# Patient Record
Sex: FEMALE | Race: WHITE
[De-identification: names, ages, dates, MRNs, and addresses within clinical notes are randomized per-mention and may not be internally consistent; named-entity substitution may affect disease eponyms.]

---

## 2017-03-01 ENCOUNTER — HOSPITAL ENCOUNTER (INPATIENT)
Dept: HOSPITAL 61 - ER | Age: 82
LOS: 2 days | Discharge: HOME | DRG: 177 | End: 2017-03-03
Attending: INTERNAL MEDICINE | Admitting: INTERNAL MEDICINE
Payer: MEDICARE

## 2017-03-01 VITALS — SYSTOLIC BLOOD PRESSURE: 120 MMHG | DIASTOLIC BLOOD PRESSURE: 61 MMHG

## 2017-03-01 VITALS — SYSTOLIC BLOOD PRESSURE: 113 MMHG | DIASTOLIC BLOOD PRESSURE: 64 MMHG

## 2017-03-01 VITALS — HEIGHT: 56 IN | BODY MASS INDEX: 27.9 KG/M2 | WEIGHT: 124 LBS

## 2017-03-01 VITALS — DIASTOLIC BLOOD PRESSURE: 62 MMHG | SYSTOLIC BLOOD PRESSURE: 111 MMHG

## 2017-03-01 VITALS — SYSTOLIC BLOOD PRESSURE: 145 MMHG | DIASTOLIC BLOOD PRESSURE: 67 MMHG

## 2017-03-01 VITALS — SYSTOLIC BLOOD PRESSURE: 127 MMHG | DIASTOLIC BLOOD PRESSURE: 55 MMHG

## 2017-03-01 DIAGNOSIS — Z82.49: ICD-10-CM

## 2017-03-01 DIAGNOSIS — J44.0: ICD-10-CM

## 2017-03-01 DIAGNOSIS — E03.9: ICD-10-CM

## 2017-03-01 DIAGNOSIS — J69.0: Primary | ICD-10-CM

## 2017-03-01 DIAGNOSIS — J96.20: ICD-10-CM

## 2017-03-01 DIAGNOSIS — I48.2: ICD-10-CM

## 2017-03-01 DIAGNOSIS — Z92.3: ICD-10-CM

## 2017-03-01 DIAGNOSIS — M81.0: ICD-10-CM

## 2017-03-01 DIAGNOSIS — J45.909: ICD-10-CM

## 2017-03-01 DIAGNOSIS — Z88.6: ICD-10-CM

## 2017-03-01 DIAGNOSIS — J44.1: ICD-10-CM

## 2017-03-01 DIAGNOSIS — F41.9: ICD-10-CM

## 2017-03-01 DIAGNOSIS — Z90.710: ICD-10-CM

## 2017-03-01 DIAGNOSIS — Z99.81: ICD-10-CM

## 2017-03-01 DIAGNOSIS — Z85.118: ICD-10-CM

## 2017-03-01 DIAGNOSIS — F32.9: ICD-10-CM

## 2017-03-01 DIAGNOSIS — Z90.49: ICD-10-CM

## 2017-03-01 DIAGNOSIS — Z87.891: ICD-10-CM

## 2017-03-01 DIAGNOSIS — I50.42: ICD-10-CM

## 2017-03-01 LAB
ANION GAP SERPL CALC-SCNC: 1 MMOL/L (ref 6–14)
BASOPHILS # BLD AUTO: 0 X10^3/UL (ref 0–0.2)
BASOPHILS NFR BLD: 1 % (ref 0–3)
BUN SERPL-MCNC: 14 MG/DL (ref 7–20)
CALCIUM SERPL-MCNC: 9 MG/DL (ref 8.5–10.1)
CHLORIDE SERPL-SCNC: 97 MMOL/L (ref 98–107)
CO2 SERPL-SCNC: 43 MMOL/L (ref 21–32)
CREAT SERPL-MCNC: 1.1 MG/DL (ref 0.6–1)
EOSINOPHIL NFR BLD: 2 % (ref 0–3)
ERYTHROCYTE [DISTWIDTH] IN BLOOD BY AUTOMATED COUNT: 16.8 % (ref 11.5–14.5)
GFR SERPLBLD BASED ON 1.73 SQ M-ARVRAT: 47.3 ML/MIN
GLUCOSE SERPL-MCNC: 159 MG/DL (ref 70–99)
HCT VFR BLD CALC: 29.1 % (ref 36–47)
HGB BLD-MCNC: 9.1 G/DL (ref 12–15.5)
LYMPHOCYTES # BLD: 1.3 X10^3/UL (ref 1–4.8)
LYMPHOCYTES NFR BLD AUTO: 20 % (ref 24–48)
MCH RBC QN AUTO: 28 PG (ref 25–35)
MCHC RBC AUTO-ENTMCNC: 31 G/DL (ref 31–37)
MCV RBC AUTO: 89 FL (ref 79–100)
MONOCYTES NFR BLD: 12 % (ref 0–9)
NEUTROPHILS NFR BLD AUTO: 66 % (ref 31–73)
PLATELET # BLD AUTO: 102 X10^3/UL (ref 140–400)
POTASSIUM SERPL-SCNC: 4 MMOL/L (ref 3.5–5.1)
RBC # BLD AUTO: 3.28 X10^6/UL (ref 3.5–5.4)
SODIUM SERPL-SCNC: 141 MMOL/L (ref 136–145)
WBC # BLD AUTO: 6.7 X10^3/UL (ref 4–11)

## 2017-03-01 PROCEDURE — 36415 COLL VENOUS BLD VENIPUNCTURE: CPT

## 2017-03-01 PROCEDURE — 94640 AIRWAY INHALATION TREATMENT: CPT

## 2017-03-01 PROCEDURE — 83880 ASSAY OF NATRIURETIC PEPTIDE: CPT

## 2017-03-01 PROCEDURE — 96375 TX/PRO/DX INJ NEW DRUG ADDON: CPT

## 2017-03-01 PROCEDURE — 84484 ASSAY OF TROPONIN QUANT: CPT

## 2017-03-01 PROCEDURE — 80048 BASIC METABOLIC PNL TOTAL CA: CPT

## 2017-03-01 PROCEDURE — 71020: CPT

## 2017-03-01 PROCEDURE — 83605 ASSAY OF LACTIC ACID: CPT

## 2017-03-01 PROCEDURE — 71010: CPT

## 2017-03-01 PROCEDURE — 85027 COMPLETE CBC AUTOMATED: CPT

## 2017-03-01 PROCEDURE — 74230 X-RAY XM SWLNG FUNCJ C+: CPT

## 2017-03-01 PROCEDURE — 96365 THER/PROPH/DIAG IV INF INIT: CPT

## 2017-03-01 PROCEDURE — 94760 N-INVAS EAR/PLS OXIMETRY 1: CPT

## 2017-03-01 PROCEDURE — 93005 ELECTROCARDIOGRAM TRACING: CPT

## 2017-03-01 PROCEDURE — 87040 BLOOD CULTURE FOR BACTERIA: CPT

## 2017-03-01 PROCEDURE — 94250: CPT

## 2017-03-01 RX ADMIN — LEVOTHYROXINE SODIUM SCH MCG: 88 TABLET ORAL at 10:36

## 2017-03-01 RX ADMIN — METHYLPREDNISOLONE SODIUM SUCCINATE SCH MG: 125 INJECTION, POWDER, FOR SOLUTION INTRAMUSCULAR; INTRAVENOUS at 13:01

## 2017-03-01 RX ADMIN — METHYLPREDNISOLONE SODIUM SUCCINATE SCH MG: 125 INJECTION, POWDER, FOR SOLUTION INTRAMUSCULAR; INTRAVENOUS at 21:15

## 2017-03-01 RX ADMIN — Medication SCH MCG: at 10:37

## 2017-03-01 RX ADMIN — OXYCODONE HYDROCHLORIDE AND ACETAMINOPHEN PRN TAB: 10; 325 TABLET ORAL at 21:15

## 2017-03-01 RX ADMIN — PIPERACILLIN SODIUM AND TAZOBACTAM SODIUM SCH MLS/HR: 2; .25 INJECTION, POWDER, LYOPHILIZED, FOR SOLUTION INTRAVENOUS at 17:33

## 2017-03-01 RX ADMIN — ENOXAPARIN SODIUM SCH MG: 40 INJECTION SUBCUTANEOUS at 10:38

## 2017-03-01 RX ADMIN — ALPRAZOLAM PRN MG: 0.25 TABLET ORAL at 21:16

## 2017-03-01 RX ADMIN — DIGOXIN SCH MCG: 0.12 TABLET ORAL at 10:41

## 2017-03-01 RX ADMIN — IPRATROPIUM BROMIDE AND ALBUTEROL SULFATE SCH ML: .5; 3 SOLUTION RESPIRATORY (INHALATION) at 15:12

## 2017-03-01 RX ADMIN — TRAZODONE HYDROCHLORIDE SCH MG: 100 TABLET ORAL at 21:16

## 2017-03-01 RX ADMIN — SENNOSIDES A AND B SCH MG: 8.6 TABLET, FILM COATED ORAL at 10:35

## 2017-03-01 RX ADMIN — ALPRAZOLAM PRN MG: 0.25 TABLET ORAL at 16:16

## 2017-03-01 RX ADMIN — PIPERACILLIN SODIUM AND TAZOBACTAM SODIUM SCH MLS/HR: 2; .25 INJECTION, POWDER, LYOPHILIZED, FOR SOLUTION INTRAVENOUS at 12:33

## 2017-03-01 RX ADMIN — ALPRAZOLAM PRN MG: 0.25 TABLET ORAL at 10:33

## 2017-03-01 RX ADMIN — DOCUSATE SODIUM SCH MG: 100 CAPSULE, LIQUID FILLED ORAL at 10:32

## 2017-03-01 RX ADMIN — BACITRACIN SCH MLS/HR: 5000 INJECTION, POWDER, FOR SOLUTION INTRAMUSCULAR at 10:28

## 2017-03-01 RX ADMIN — IPRATROPIUM BROMIDE AND ALBUTEROL SULFATE SCH ML: .5; 3 SOLUTION RESPIRATORY (INHALATION) at 19:57

## 2017-03-01 RX ADMIN — IPRATROPIUM BROMIDE AND ALBUTEROL SULFATE SCH ML: .5; 3 SOLUTION RESPIRATORY (INHALATION) at 11:17

## 2017-03-01 RX ADMIN — PIPERACILLIN SODIUM AND TAZOBACTAM SODIUM SCH MLS/HR: 2; .25 INJECTION, POWDER, LYOPHILIZED, FOR SOLUTION INTRAVENOUS at 23:59

## 2017-03-01 RX ADMIN — ESCITALOPRAM OXALATE SCH MG: 10 TABLET ORAL at 10:35

## 2017-03-01 NOTE — PHYS DOC
Past Medical History


Past Medical History:  A-Fib, Anxiety, Asthma, Bronchitis, Cancer, COPD, 

Depression, Hypothyroid, Other


Additional Past Medical Histor:  chronic back, lung ca; hernia, HEART DISEASE, 

EMPHESEMA, Osteoporosis


Past Surgical History:  Appendectomy, Cholecystectomy, , Hysterectomy, 

Tonsillectomy, Other


Additional Past Surgical Histo:  back X2, hernia x 3


Alcohol Use:  None


Drug Use:  None





Adult General


Chief Complaint


Chief Complaint:  SHORTNESS OF BREATH





HPI


HPI


An 84-year-old female who presents with acute shortness of breath. EMS found 

her satting in the low 80s on her usual 6 L. She states she developed 

difficulty breathing apartment [address] ours prior to EMS arrival. She denies any 

chest pain. She denies any cough or fever. She does have history of atrial 

fibrillation and COPD. Patient was refusing CPAP therapy and nonrebreather and 

so she was they placed on 8 liters with improvement of her saturations. Upon 

arrival, patient is tachypnea but able to speak in complete sentences in mild 

respiratory distress.





Review of Systems


Review of Systems





Constitutional: Denies fever or chills []


Eyes: Denies change in visual acuity, redness, or eye pain []


HENT: Denies nasal congestion or sore throat []


Respiratory: Denies cough, has shortness of breath []


Cardiovascular: No additional information not addressed in HPI []


GI: Denies abdominal pain, nausea, vomiting, bloody stools or diarrhea []


: Denies dysuria or hematuria []


Musculoskeletal: Denies back pain or joint pain []


Integument: Denies rash or skin lesions []


Neurologic: Denies headache, focal weakness or sensory changes []


Endocrine: Denies polyuria or polydipsia []





Current Medications


Current Medications





 Current Medications








 Medications


  (Trade)  Dose


 Ordered  Sig/Franky  Start Time


 Stop Time Status Last Admin


Dose Admin


 


 Albuterol/


 Ipratropium


  (Duoneb)  3 ml  1X  ONCE  3/1/17 05:15


 3/1/17 05:20 DC  


 


 


 Albuterol/


 Ipratropium 3 ml  3 ml  RTQID  3/1/17 08:00


 3/2/17 07:59   


 


 


 Levofloxacin/


 Dextrose


  (LEVAQUIN 750mg


 PREMIX)  150 ml @ 


 100 mls/hr  Q48H  3/3/17 05:00


     


 


 


 Levofloxacin/


 Dextrose


  (Levaquin Per


 Pharmacy)  1 each  PRN DAILY  PRN  3/1/17 06:00


     


 


 


 Methylprednisolone


 Sodium Succinate


  (Solu-Medrol


 125mg Vial)  125 mg  1X  ONCE  3/1/17 05:15


 3/1/17 05:20 DC 3/1/17 05:52


125 MG


 


 Ondansetron HCl


  (Zofran)  4 mg  PRN Q8HRS  PRN  3/1/17 06:00


 3/2/17 05:59   


 


 


 Piperacillin Sod/


 Tazobactam Sod


  (Zosyn Per


 Pharmacy)  1 each  PRN DAILY  PRN  3/1/17 06:00


   UNV  


 


 


 Piperacillin Sod/


 Tazobactam Sod


 2.25 gm/Sodium


 Chloride  50 ml @ 


 100 mls/hr  ONCE  ONCE  3/1/17 07:00


 3/1/17 07:29  3/1/17 06:23


100 MLS/HR


 


 Vancomycin HCl


  (Vanco Per


 Pharmacy)  1 each  PRN DAILY  PRN  3/1/17 06:00


   UNV  


 











Allergies


Allergies





 Allergies








Coded Allergies Type Severity Reaction Last Updated Verified


 


  morphine Allergy Intermediate PER DAUGHTER, DOES NOT AGREE WITH PT 14 Yes


 


  adhesive Adverse Reaction Intermediate blisters 4/22/15 Yes


 


  aspirin Adverse Reaction Intermediate Nausea and Vomiting, "makes me sick" 4/

22/15 Yes











Physical Exam


Physical Exam





Constitutional: Well developed, well nourished, no acute distress, non-toxic 

appearance. []


HENT: Normocephalic, atraumatic, bilateral external ears normal, oropharynx 

moist, no oral exudates, nose normal. []


Eyes: PERRLA, EOMI, conjunctiva normal, no discharge. [] 


Neck: Normal range of motion, no tenderness, supple, no stridor. [] 


Cardiovascular:Heart rate regular rhythm, no murmur []


Lungs & Thorax:  Bilateral breath sounds clear to auscultation []


Abdomen: Bowel sounds normal, soft, no tenderness, no masses, no pulsatile 

masses. [] 


Skin: Warm, dry, no erythema, no rash. [] 


Back: No tenderness, no CVA tenderness. [] 


Extremities: No tenderness, no cyanosis, no clubbing, ROM intact, no edema. [] 


Neurologic: Alert and oriented X 3, normal motor function, normal sensory 

function, no focal deficits noted. []


Psychologic: Affect normal, judgement normal, mood normal. []





Current Patient Data


Vital Signs





 Vital Signs








  Date Time  Temp Pulse Resp B/P Pulse Ox O2 Delivery O2 Flow Rate FiO2


 


3/1/17 05:31     94 Nasal Cannula 8.0 


 


3/1/17 05:25 98.5 92 28 109/73    





 98.5       








Lab Values





 Laboratory Tests








Test


  3/1/17


05:10


 


White Blood Count


  6.7x10^3/uL


(4.0-11.0)


 


Red Blood Count


  3.28x10^6/uL


(3.50-5.40)  L


 


Hemoglobin


  9.1g/dL


(12.0-15.5)  L


 


Hematocrit


  29.1%


(36.0-47.0)  L


 


Mean Corpuscular Volume 89fL ()  


 


Mean Corpuscular Hemoglobin 28pg (25-35)  


 


Mean Corpuscular Hemoglobin


Concent 31g/dL (31-37)


 


 


Red Cell Distribution Width


  16.8%


(11.5-14.5)  H


 


Platelet Count


  102x10^3/uL


(140-400)  L


 


Neutrophils (%) (Auto) 66% (31-73)  


 


Lymphocytes (%) (Auto) 20% (24-48)  L


 


Monocytes (%) (Auto) 12% (0-9)  H


 


Eosinophils (%) (Auto) 2% (0-3)  


 


Basophils (%) (Auto) 1% (0-3)  


 


Neutrophils # (Auto)


  4.4x10^3uL


(1.8-7.7)


 


Lymphocytes # (Auto)


  1.3x10^3/uL


(1.0-4.8)


 


Monocytes # (Auto)


  0.8x10^3/uL


(0.0-1.1)


 


Eosinophils # (Auto)


  0.1x10^3/uL


(0.0-0.7)


 


Basophils # (Auto)


  0.0x10^3/uL


(0.0-0.2)


 


Sodium Level


  141mmol/L


(136-145)


 


Potassium Level


  4.0mmol/L


(3.5-5.1)


 


Chloride Level


  97mmol/L


()  L


 


Carbon Dioxide Level


  43mmol/L


(21-32)  H


 


Anion Gap 1 (6-14)  L


 


Blood Urea Nitrogen


  14mg/dL (7-20)


 


 


Creatinine


  1.1mg/dL


(0.6-1.0)  H


 


Estimated GFR


(Cockcroft-Gault) 47.3  


 


 


Glucose Level


  159mg/dL


(70-99)  H


 


Lactic Acid Level


  1.1mmol/L


(0.4-2.0)


 


Calcium Level


  9.0mg/dL


(8.5-10.1)


 


Troponin I Quantitative


  < 0.017ng/mL


(0.000-0.055)


 


NT-Pro-B-Type Natriuretic


Peptide 1257pg/mL


(0-449)  H





 Laboratory Tests


3/1/17 05:10








 Laboratory Tests


3/1/17 05:10











EKG


EKG


EKG as interpreted by me shows an irregular rhythm with a rate of 102 bpm. 

There is some artifact seen throughout this EKG but there are no obvious 

ischemic findings. This EKG does not meet any STEMI criteria. The intervals are 

normal. There is a leftward axis. There are occasional PVCs





Radiology/Procedures


Radiology/Procedures


One view of the chest as interpreted by me shows findings concerning for a 

possible left-sided pneumonia.





Course & Med Decision Making


Course & Med Decision Making


Pertinent Labs and Imaging studies reviewed. (See chart for details)





84-year-old female who is had significant significant dyspnea and requiring 

higher oxygen requirement than her usual 6 L will be admitted to the hospital 

for likely COPD exacerbation and a healthcare acquired pneumonia. I discussed 

the need to admit the patient with the hospitalist, Dr. Colindres, who agreed to 

begin antibiotic therapy and to consult pulmonology as well. Patient was given 

a breathing treatment and steroids as well. Antibiotic therapy was ordered and 

initiated. Cultures and lactate were obtained prior to antibiotic 

administration. Her laboratory workup was fairly unremarkable other than a 

slightly elevated proBNP.





Dragon Disclaimer


Dragon Disclaimer


This electronic medical record was generated, in whole or in part, using a 

voice recognition dictation system.





Departure


Departure


Impression:  


 Primary Impression:  


 COPD (chronic obstructive pulmonary disease)


 Additional Impression:  


 Healthcare-associated pneumonia


Disposition:   ADMITTED AS INPATIENT


Admitting Physician:  Summer Tabor


Condition:  STABLE


Referrals:  


SUMMER TABOR MD (PCP)





Problem Qualifiers








VILMA HINOJOSA DO Mar 1, 2017 05:23

## 2017-03-01 NOTE — HP
ADMIT DATE:  2017



REASON FOR ADMISSION TO THE HOSPITAL:  COPD with acute exacerbation, possible

pneumonia.



HISTORY OF PRESENT ILLNESS:  The patient is an 84-year-old female patient known

to me who has a history of severe COPD and lung cancer.  She was having

shortness of breath and cough and came to the Emergency Room.  She was admitted

to the hospital with COPD exacerbation and possible pneumonitis.



PAST MEDICAL HISTORY:  COPD and hypothyroidism.  She is on 5 liters oxygen,

diastolic heart failure, atrial fibrillation, lung cancer, radiation treatment 4

years ago, osteoporosis, and compression of the spine, had multiple

vertebroplasties.



PAST SURGICAL HISTORY:  Appendectomy, gallbladder surgery, ,

hysterectomy, tonsillectomy, back surgeries x 2, hernia surgery, and

vertebroplasties at least 2 or 3.



ALLERGIES:  MORPHINE, ASPIRIN, AND ADHESIVE TAPE.



MEDICATIONS AT HOME:  She is on 3 liters, can go up to 5 liters of oxygen daily,

B12 1000 mcg daily, DuoNeb 4 times daily, Xanax 0.25 three times daily,

Symbicort twice a day 160/4.5, vitamin D 1000 units daily, digoxin 125 mcg

daily, Colace 100 mg daily, Lexapro 20 mg daily, Combivent 2 puffs 4 times

daily, levothyroxine 88 mcg daily, oxycodone 5 mg q. 6 hours, trazodone 100 mg

daily, and Zofran for nausea.



PERSONAL HISTORY:  She smoked for 30 years, quit 4 to 5 years ago.  Denies

alcohol or drug abuse.



SOCIAL HISTORY:  Lives at home.  She uses a walker, and she has oxygen at home

and a breathing machine at home.



FAMILY HISTORY:  Positive for hypertension and heart disease.



REVIEW OF SYMPTOMS:  Denies any coughing or spitting green phlegm at the present

time.  A couple of days ago, she had shortness of breath and wheezing.  No chest

pain.  Rest of the 14-system was reviewed and negative.



PHYSICAL EXAMINATION:

VITAL SIGNS:  At the time of admission shows a temperature 98, pulse 96,

respirations 26, and blood pressure 109/73, 96% on 8 liters.

HEENT:  Head is atraumatic.  Pupils are equal.  Oral cavity:  Dentures.  Slight

congestion of posterior pharynx.

CHEST:  COPD pattern.

CARDIOVASCULAR:  S1 and S2.

LUNGS:  Bilateral wheezing.

ABDOMEN:  Soft, no mass palpable.

EXTERNAL GENITALIA:  No Gusman.

RECTAL:  Deferred.

EXTREMITIES:  No calf tenderness or edema.  The patient has kyphosis of the

spine from osteoporosis.

SKIN:  Normal skin turgor.

LYMPH NODES:  No significant lymphadenopathy.  Pulses 1+ dorsal and posterior

tibial.



LABORATORY DATA:  White count 6, hemoglobin 9, and platelets 102.  Electrolytes

show sodium 141, potassium 4.1, chloride 97, bicarbonate 43, BUN 14, creatinine

1.1, glucose 159, lactic acid 1.9, troponin 0.127, and BNP 1257.  Chest x-ray

shows no acute findings, some fibrosis and scarring.



FINAL IMPRESSION:

1.  Chronic obstructive pulmonary disease with acute exacerbation, possibility

of underlying pneumonitis.

2.  Scarring in the lung, history of lung cancer, had radiation treatment 4 to 5

years ago.

3.  Hypothyroidism.

4.  Atrial fibrillation.

5.  Chronic systolic heart failure.

6.  Anxiety and depression.



PLAN:  At this time, admit to hospital, sputum cultures.  Started on Solu-Medrol

t.i.d., Zosyn, and vancomycin and see how the patient can improve.  Some

breathing treatments, hydration, and the patient wishes DNR.

 



______________________________

SUMMER TABOR MD



DR:  RAGINI/aidan  JOB#:  [id number] / [id number]

DD:  2017 10:02  DT:  2017 13:01

## 2017-03-01 NOTE — ACF
Admission Forms Criteria


                                                           COPD





Clinical Indications for Admission to Inpatient Care


                                                                                

(Place 'X' for any and all applicable criteria):





Admission is indicated for ANY ONE of the following (1)(2)(3):


[X]I.    Acute exacerbation by high-risk comorbidity (e.g., pneumonia, 

dysrhythmia, heart failure, pleural effusion, 


        pneumothorax) or severe underlying COPD (e.g., steroid dependent)


[ ]II.   Inpatient admission required rather than observation care (see Chronic 

Obstructive Pulmonary


        Disease: Observation Care) because of ANY ONE of the following:


        [ ]a)   New or pre-existing signs or symptoms of COPD (eg, dyspnea or 

Tachypnea at rest or with


                 minimal activity) that persist despite outpatient and 

observation care treatment


        [ ]b)   New-onset hypoxemia (room air SaO2 less than 90%, PO2 less than 

60 mm Hg (8.0 kPa))


                 that persists despite outpatient and observation care treatment


        [ ]c)   Worsening of pre-existing hypoxemia (eg, new or increased 

requirement for supplemental


                 oxygen to maintain oxygenation at baseline level) that 

persists despite outpatient and


                 observation care treatment, with oxygen treatment needs 

performable only in acute inpatient setting


        [ ]d)   Hypercarbia (PCO2 greater than 40 mm Hg (5.3 kPa))-induced 

respiratory acidosis (pH less


                 than 7.35) that persists despite outpatient and observation 

care treatment


        [ ]e)   Supplemental oxygen or respiratory treatments for over 24 hours 

that are performable only in acute inpatient setting


        [ ]f)    Chest tube placement with active evacuation (e.g., suction, 

drainage) (5)


        [ ]g)   Other condition, treatment or monitoring requiring inpatient 

admission


[ ]III.  Planned invasive surgical or diagnostic procedures requiring acute-

care hospitalization 


[ ]IV. Acute respiratory failure (e.g., uncompensated hypercarbia, severe 

hypoxemia) 


[ ]V.  Severe comorbid condition (e.g., severe steroid myopathy, acute 

vertebral fracture) that has acutely worsened pulmonary function


[ ]VI. Confusion state, lethargy, obtundation, stupor or coma











Extended stay beyond goal length of stay may be needed for (31)(32):


[ ]a ) Respiratory Failure.


[ ]b) Severe or persisting hypoxemia or hypercarbia


[ ]c) Severe or persistent dyspnea


[ ]d) Comorbidities (e.g. chronic heart failure, atrial fibrillation with rapid 

response, pneumonia)


[ ]e) Malnutrition








The original [facility] content created by [facility] has been revised. 


The portions of the content which have been revised are identified through the 

use of italic text or in bold, and [facility] 


has neither reviewed nor approved the  modified material. All other unmodified 

content is copyright   [facility].





Please see references footnoted in the original [facility] edition 

2016


Admission Criteria Met?:  Yes








MARGARETTE DIAZ Mar 1, 2017 10:06

## 2017-03-01 NOTE — EKG
[facility]

              [address] [place] [address]

Test Date:    2017               Test Time:    05:16:14

Pat Name:     YAN COLLINS             Department:   

Patient ID:   [id number]           Room:          

Gender:       F                        Technician:   

:          1932               Requested By: VILMA HINOJOSA

Order Number: [id number]            Reading MD:   Joce Lemons

                                 Measurements

Intervals                              Axis          

Rate:         102                      P:            

KS:                                    QRS:          -24

QRSD:         78                       T:            67

QT:           340                                    

QTc:          447                                    

                           Interpretive Statements

ATRIAL FIBRILLATION.

VENTRICULAR PREMATURE COMPLEX(ES)

LEFTWARD AXIS

LOW LIMB LEAD VOLTAGE

QRS(T) CONTOUR ABNORMALITY

CONSIDER ANTEROSEPTAL INFARCT

CONSISTENT WITH INFERIOR INFARCT

PROBABLY OLD

T ABNORMALITY IN HIGH LATERAL LEADS

RI6.01          Unconfirmed report



Electronically Signed On 3-6-2017 13:33:34 CST by Joce Lemons

## 2017-03-01 NOTE — RAD
Indication shortness of air.



A single view of the chest was obtained. Comparison is made to an examination

January 18, 2017.



There are suspect background changes of fibrosis. Heart size is unchanged. The

pulmonary vasculature is similar. Acute parenchymal infiltrate is not seen. A

significant change in the appearance of the chest compared to the previous

exam is not seen. Kyphoplasty changes are noted at multiple levels similar to

the prior study. Hiatus hernia is noted.



IMPRESSION: No acute finding. No significant change

## 2017-03-02 VITALS — DIASTOLIC BLOOD PRESSURE: 73 MMHG | SYSTOLIC BLOOD PRESSURE: 145 MMHG

## 2017-03-02 VITALS — SYSTOLIC BLOOD PRESSURE: 112 MMHG | DIASTOLIC BLOOD PRESSURE: 60 MMHG

## 2017-03-02 VITALS — DIASTOLIC BLOOD PRESSURE: 44 MMHG | SYSTOLIC BLOOD PRESSURE: 130 MMHG

## 2017-03-02 VITALS — SYSTOLIC BLOOD PRESSURE: 122 MMHG | DIASTOLIC BLOOD PRESSURE: 49 MMHG

## 2017-03-02 VITALS — SYSTOLIC BLOOD PRESSURE: 133 MMHG | DIASTOLIC BLOOD PRESSURE: 49 MMHG

## 2017-03-02 VITALS — DIASTOLIC BLOOD PRESSURE: 72 MMHG | SYSTOLIC BLOOD PRESSURE: 118 MMHG

## 2017-03-02 RX ADMIN — DIGOXIN SCH MCG: 0.12 TABLET ORAL at 08:54

## 2017-03-02 RX ADMIN — OXYCODONE HYDROCHLORIDE AND ACETAMINOPHEN PRN TAB: 10; 325 TABLET ORAL at 20:48

## 2017-03-02 RX ADMIN — IPRATROPIUM BROMIDE AND ALBUTEROL SULFATE SCH ML: .5; 3 SOLUTION RESPIRATORY (INHALATION) at 15:01

## 2017-03-02 RX ADMIN — OXYCODONE HYDROCHLORIDE AND ACETAMINOPHEN PRN TAB: 10; 325 TABLET ORAL at 08:58

## 2017-03-02 RX ADMIN — LEVOTHYROXINE SODIUM SCH MCG: 88 TABLET ORAL at 05:17

## 2017-03-02 RX ADMIN — DOCUSATE SODIUM SCH MG: 100 CAPSULE, LIQUID FILLED ORAL at 08:52

## 2017-03-02 RX ADMIN — SENNOSIDES A AND B SCH MG: 8.6 TABLET, FILM COATED ORAL at 08:53

## 2017-03-02 RX ADMIN — Medication SCH MCG: at 08:52

## 2017-03-02 RX ADMIN — ESCITALOPRAM OXALATE SCH MG: 10 TABLET ORAL at 08:55

## 2017-03-02 RX ADMIN — ALPRAZOLAM PRN MG: 0.25 TABLET ORAL at 20:44

## 2017-03-02 RX ADMIN — IPRATROPIUM BROMIDE AND ALBUTEROL SULFATE SCH ML: .5; 3 SOLUTION RESPIRATORY (INHALATION) at 07:22

## 2017-03-02 RX ADMIN — BACITRACIN SCH MLS/HR: 5000 INJECTION, POWDER, FOR SOLUTION INTRAMUSCULAR at 01:09

## 2017-03-02 RX ADMIN — TRAZODONE HYDROCHLORIDE SCH MG: 100 TABLET ORAL at 20:44

## 2017-03-02 RX ADMIN — LEVOFLOXACIN SCH MG: 250 TABLET, FILM COATED ORAL at 11:19

## 2017-03-02 RX ADMIN — ENOXAPARIN SODIUM SCH MG: 40 INJECTION SUBCUTANEOUS at 11:19

## 2017-03-02 RX ADMIN — METHYLPREDNISOLONE SODIUM SUCCINATE SCH MG: 125 INJECTION, POWDER, FOR SOLUTION INTRAMUSCULAR; INTRAVENOUS at 11:19

## 2017-03-02 RX ADMIN — METHYLPREDNISOLONE SODIUM SUCCINATE SCH MG: 125 INJECTION, POWDER, FOR SOLUTION INTRAMUSCULAR; INTRAVENOUS at 20:44

## 2017-03-02 RX ADMIN — PIPERACILLIN SODIUM AND TAZOBACTAM SODIUM SCH MLS/HR: 2; .25 INJECTION, POWDER, LYOPHILIZED, FOR SOLUTION INTRAVENOUS at 05:17

## 2017-03-02 RX ADMIN — IPRATROPIUM BROMIDE AND ALBUTEROL SULFATE SCH ML: .5; 3 SOLUTION RESPIRATORY (INHALATION) at 11:01

## 2017-03-02 RX ADMIN — METHYLPREDNISOLONE SODIUM SUCCINATE SCH MG: 125 INJECTION, POWDER, FOR SOLUTION INTRAMUSCULAR; INTRAVENOUS at 05:17

## 2017-03-02 RX ADMIN — IPRATROPIUM BROMIDE AND ALBUTEROL SULFATE SCH ML: .5; 3 SOLUTION RESPIRATORY (INHALATION) at 20:11

## 2017-03-02 NOTE — CONS
DATE OF CONSULTATION:  2017



ATTENDING PHYSICIAN:  Dr. Couch.



REASON FOR CONSULTATION:  The patient is seen in pulmonary consultation at the

request of Dr. Couch for acute on chronic respiratory failure.



HISTORY OF PRESENT ILLNESS:  The patient is an 84-year-old who presented with

increasing shortness of breath to the Emergency Room.  She usually wears 6

liters of oxygen supplementation; saturations were in the 80s.  She is

developing increasing shortness of breath over the last 24 hours.  No fever,

chills, cough, mostly nonproductive.  She improved with increasing oxygen

supplementation.  Chest x-ray was obtained.  I did not appreciate a new

infiltrate.



PAST MEDICAL HISTORY:  Chronic respiratory failure, COPD, AFib, lung cancer

treated with radiation 4 years ago, osteoporosis, and compression of the spine.



PAST SURGICAL HISTORY:  Status post appendectomy, cholecystectomy, ,

hysterectomy, tonsillectomy, back surgery, hernia surgery, and vertebroplasty.



ALLERGIES:  MORPHINE, ASPIRIN, AND ADHESIVE TAPE.



MEDICATIONS:  List from home was reviewed.  Please see the MRAD.



CURRENT MEDICATION:  List was likewise reviewed.



SOCIAL HISTORY:  She quit tobacco 4-5 years ago.  Denies any alcohol intake.



REVIEW OF SYSTEMS:  As indicated above, otherwise, a 10-point system was

reviewed and negative.



PHYSICAL EXAMINATION:

VITAL SIGNS:  Stable.  O2 saturation was greater than 92%, currently on 8 liters

of oxygen.

HEENT:  Eyes, the sclerae were nonicteric.

NECK:  Jugular venous distention was not elevated.  No lymphadenopathy.

CHEST:  Full expansion.

LUNGS:  Poor airway flow with expiratory wheeze.

CARDIOVASCULAR:  Regular rate and rhythm with S1, S2, no S3.

ABDOMEN:  Soft, nontender, nondistended.

EXTREMITIES:  No clubbing, cyanosis or edema.

NEUROLOGIC:  The patient was awake, alert, following commands.  A detailed neuro

exam was not performed.



LABORATORY DATA:  Reviewed.  White count was normal.  Hemoglobin and hematocrit

were noted.  Electrolytes were noted.



DIAGNOSTIC DATA:  Chest x-ray as indicated above.  No acute infiltrates.



IMPRESSION:

1.  Acute on chronic respiratory failure secondary to acute exacerbation of

chronic obstructive pulmonary disease.

2.  Clinical pneumonia.

3.  History of lung cancer status post radiation 4-5 years ago.

4.  Hypothyroidism.

5.  Chronic atrial fibrillation.

6.  Chronic systolic failure.



PLAN:

1.  I agree with current medical management, continue oxygen supplementation.

2.  Steroids.

3.  Broad spectrum antibiotics.

4.  Nebulized treatments.



I do appreciate the privilege in sharing in the patient's care.

 



______________________________

JULIO CESAR KAPADIA MD



DR:  MICHAEL/aidan  JOB#:  [id number] / [id number]

DD:  2017 16:01  DT:  2017 02:40

## 2017-03-02 NOTE — RAD
Chest, 2 views, 3/2/2017:



History: COPD



Comparison is made to yesterday's study. The heart is enlarged. There is

tortuosity and calcific plaquing of the thoracic aorta. The pulmonary

vascularity is at the upper limits of normal. There are scattered parenchymal

scars. No acute infiltrate is seen. There is no evidence of pleural fluid.



There is severe bony demineralization with vertebroplasty changes at multiple

levels in the thoracic and lumbar spine. There is a moderate associated

thoracolumbar scoliosis.





IMPRESSION:

1. Chronic findings as described above.

2. No acute cardiopulmonary abnormality is detected.

## 2017-03-02 NOTE — EKG
[facility]

              [address] [place] [address]

Test Date:    2017               Test Time:    18:09:46

Pat Name:     YAN COLLINS             Department:   

Patient ID:   [id number]           Room:         504 

Gender:       F                        Technician:   TAMIA

:          1932               Requested By: SUMMER TABOR

Order Number: [id number]            Reading MD:   Joce Lemons

                                 Measurements

Intervals                              Axis          

Rate:         86                       P:            -64

IA:           178                      QRS:          -15

QRSD:         78                       T:            -4

QT:           340                                    

QTc:          410                                    

                           Interpretive Statements

SINUS RHYTHM

VENTRICULAR PREMATURE COMPLEX(ES)

LEFTWARD AXIS

QRS(T) CONTOUR ABNORMALITY

CONSISTENT WITH INFERIOR INFARCT

PROBABLY OLD

ABNORMAL ECG

RI6.01



Electronically Signed On 3-6-2017 13:51:31 CST by Joce Lemons

## 2017-03-03 VITALS — DIASTOLIC BLOOD PRESSURE: 69 MMHG | SYSTOLIC BLOOD PRESSURE: 114 MMHG

## 2017-03-03 VITALS — DIASTOLIC BLOOD PRESSURE: 66 MMHG | SYSTOLIC BLOOD PRESSURE: 123 MMHG

## 2017-03-03 VITALS — SYSTOLIC BLOOD PRESSURE: 133 MMHG | DIASTOLIC BLOOD PRESSURE: 72 MMHG

## 2017-03-03 RX ADMIN — IPRATROPIUM BROMIDE AND ALBUTEROL SULFATE SCH ML: .5; 3 SOLUTION RESPIRATORY (INHALATION) at 07:13

## 2017-03-03 RX ADMIN — LEVOFLOXACIN SCH MG: 250 TABLET, FILM COATED ORAL at 05:12

## 2017-03-03 RX ADMIN — Medication SCH MCG: at 08:36

## 2017-03-03 RX ADMIN — DIGOXIN SCH MCG: 0.12 TABLET ORAL at 08:36

## 2017-03-03 RX ADMIN — IPRATROPIUM BROMIDE AND ALBUTEROL SULFATE SCH ML: .5; 3 SOLUTION RESPIRATORY (INHALATION) at 11:51

## 2017-03-03 RX ADMIN — SENNOSIDES A AND B SCH MG: 8.6 TABLET, FILM COATED ORAL at 08:36

## 2017-03-03 RX ADMIN — LEVOTHYROXINE SODIUM SCH MCG: 88 TABLET ORAL at 05:12

## 2017-03-03 RX ADMIN — ENOXAPARIN SODIUM SCH MG: 40 INJECTION SUBCUTANEOUS at 11:09

## 2017-03-03 RX ADMIN — ESCITALOPRAM OXALATE SCH MG: 10 TABLET ORAL at 08:35

## 2017-03-03 RX ADMIN — DOCUSATE SODIUM SCH MG: 100 CAPSULE, LIQUID FILLED ORAL at 08:37

## 2017-03-03 RX ADMIN — METHYLPREDNISOLONE SODIUM SUCCINATE SCH MG: 125 INJECTION, POWDER, FOR SOLUTION INTRAMUSCULAR; INTRAVENOUS at 08:37

## 2017-03-03 NOTE — RAD
Exam performed: Video dysphasia exam. 



Clinical Indication: Difficulty swallowing



Date of Exam: 3/3/2017 10:00 AM  .Comparison: None available



Discussion:



The study was performed in conjunction with the speech pathologist.  Barium of

varying consistencies including thin, pudding and solid consistency barium

were administered to the patient and swallowing was recorded on video and

reviewed. 



There is somewhat delayed anteroposterior transfer of the bolus. The barium

fills oral pouch probably adjacent to the dentures before swallowing Normal

pharyngeal contractions and epiglottic inversion is noted. Several episodes of

flash laryngeal penetration seen with thin consistency barium. No aspiration



The total fluoroscopy time of 2.3 minutes was utilized. A single fluoroscopic

image was saved to the PACS system



Impression:



1. Several episodes of laryngeal penetration without aspiration seen with thin

consistency barium.



For a complete evaluation, please refer to the speech pathologists report

## 2017-03-05 ENCOUNTER — HOSPITAL ENCOUNTER (EMERGENCY)
Dept: HOSPITAL 61 - ER | Age: 82
Discharge: HOME | End: 2017-03-05
Payer: MEDICARE

## 2017-03-05 VITALS — WEIGHT: 120 LBS | BODY MASS INDEX: 26.99 KG/M2 | HEIGHT: 56 IN

## 2017-03-05 VITALS — DIASTOLIC BLOOD PRESSURE: 81 MMHG | SYSTOLIC BLOOD PRESSURE: 116 MMHG

## 2017-03-05 DIAGNOSIS — Z88.6: ICD-10-CM

## 2017-03-05 DIAGNOSIS — G89.29: ICD-10-CM

## 2017-03-05 DIAGNOSIS — Z87.01: ICD-10-CM

## 2017-03-05 DIAGNOSIS — Z91.048: ICD-10-CM

## 2017-03-05 DIAGNOSIS — J45.909: ICD-10-CM

## 2017-03-05 DIAGNOSIS — J43.9: ICD-10-CM

## 2017-03-05 DIAGNOSIS — J44.9: ICD-10-CM

## 2017-03-05 DIAGNOSIS — M81.0: ICD-10-CM

## 2017-03-05 DIAGNOSIS — E03.9: ICD-10-CM

## 2017-03-05 DIAGNOSIS — R11.2: Primary | ICD-10-CM

## 2017-03-05 DIAGNOSIS — I48.91: ICD-10-CM

## 2017-03-05 DIAGNOSIS — Z90.710: ICD-10-CM

## 2017-03-05 DIAGNOSIS — F41.9: ICD-10-CM

## 2017-03-05 DIAGNOSIS — F32.9: ICD-10-CM

## 2017-03-05 DIAGNOSIS — Z90.49: ICD-10-CM

## 2017-03-05 DIAGNOSIS — Z88.5: ICD-10-CM

## 2017-03-05 LAB
ANION GAP SERPL CALC-SCNC: 2 MMOL/L (ref 6–14)
ANISOCYTOSIS BLD QL SMEAR: SLIGHT
BASOPHILS # BLD AUTO: 0 X10^3/UL (ref 0–0.2)
BASOPHILS NFR BLD: 1 % (ref 0–3)
BUN SERPL-MCNC: 22 MG/DL (ref 7–20)
CALCIUM SERPL-MCNC: 9.1 MG/DL (ref 8.5–10.1)
CHLORIDE SERPL-SCNC: 93 MMOL/L (ref 98–107)
CO2 SERPL-SCNC: 45 MMOL/L (ref 21–32)
CREAT SERPL-MCNC: 1.2 MG/DL (ref 0.6–1)
EOSINOPHIL NFR BLD: 0 % (ref 0–3)
ERYTHROCYTE [DISTWIDTH] IN BLOOD BY AUTOMATED COUNT: 17.2 % (ref 11.5–14.5)
GFR SERPLBLD BASED ON 1.73 SQ M-ARVRAT: 42.8 ML/MIN
GLUCOSE SERPL-MCNC: 112 MG/DL (ref 70–99)
HCT VFR BLD CALC: 30.8 % (ref 36–47)
HGB BLD-MCNC: 9.6 G/DL (ref 12–15.5)
LYMPHOCYTES # BLD: 0.6 X10^3/UL (ref 1–4.8)
LYMPHOCYTES NFR BLD AUTO: 7 % (ref 24–48)
MCH RBC QN AUTO: 28 PG (ref 25–35)
MCHC RBC AUTO-ENTMCNC: 31 G/DL (ref 31–37)
MCV RBC AUTO: 89 FL (ref 79–100)
MONOCYTES NFR BLD: 3 % (ref 0–9)
NEUTROPHILS NFR BLD AUTO: 90 % (ref 31–73)
PLATELET # BLD AUTO: 102 X10^3/UL (ref 140–400)
PLATELET # BLD EST: (no result) 10*3/UL
POTASSIUM SERPL-SCNC: 4.4 MMOL/L (ref 3.5–5.1)
RBC # BLD AUTO: 3.46 X10^6/UL (ref 3.5–5.4)
SODIUM SERPL-SCNC: 140 MMOL/L (ref 136–145)
WBC # BLD AUTO: 8.7 X10^3/UL (ref 4–11)

## 2017-03-05 PROCEDURE — 80048 BASIC METABOLIC PNL TOTAL CA: CPT

## 2017-03-05 PROCEDURE — 36415 COLL VENOUS BLD VENIPUNCTURE: CPT

## 2017-03-05 PROCEDURE — 85027 COMPLETE CBC AUTOMATED: CPT

## 2017-03-05 PROCEDURE — 85007 BL SMEAR W/DIFF WBC COUNT: CPT

## 2017-03-05 PROCEDURE — 94640 AIRWAY INHALATION TREATMENT: CPT

## 2017-03-05 PROCEDURE — 99285 EMERGENCY DEPT VISIT HI MDM: CPT

## 2017-03-05 PROCEDURE — 93005 ELECTROCARDIOGRAM TRACING: CPT

## 2017-03-05 NOTE — PHYS DOC
Past Medical History


Past Medical History:  A-Fib, Anxiety, Asthma, Bronchitis, Cancer, COPD, 

Depression, Hypothyroid, Pneumonia, Other


Additional Past Medical Histor:  chronic back, lung ca; hernia, HEART DISEASE, 

EMPHESEMA, Osteoporosis


Past Surgical History:  Appendectomy, Cholecystectomy, , Hysterectomy, 

Tonsillectomy, Other


Additional Past Surgical Histo:  back X2, hernia x 3


Alcohol Use:  None


Drug Use:  None





Adult General


Chief Complaint


Chief Complaint:  NAUSEA/VOMITING/DIARRHA





HPI


HPI


Patient is a 84  year old female with recent hospitalization for COPD 

exacerbation who presents by EMS for nausea starting this morning associated 

with few episodes of nonbloody nonbilious emesis that started around 12:00 

today. States she also has some difficulty breathing this afternoon associated 

with recent COPD exacerbation; this is managed by home meds otherwise. She has 

not used breathing treatment at home since this morning. She denies abdominal 

pain, fever or chills, diarrhea, dysuria, chest pain, cough, lightheadedness, 

dizziness.





Review of Systems


Review of Systems





Constitutional: Denies fever or chills []


Eyes: Denies change in visual acuity, redness, or eye pain []


HENT: Denies nasal congestion or sore throat []


Respiratory: Denies cough  []


Cardiovascular: No additional information not addressed in HPI []


GI: Denies abdominal pain, bloody stools or diarrhea []


: Denies dysuria or hematuria []


Musculoskeletal: Denies back pain or joint pain []


Integument: Denies rash or skin lesions []


Neurologic: Denies headache, focal weakness or sensory changes []


Endocrine: Denies polyuria or polydipsia []





Current Medications


Current Medications





 Current Medications








 Medications


  (Trade)  Dose


 Ordered  Sig/[facility]  Start Time


 Stop Time Status Last Admin


Dose Admin


 


 Albuterol/


 Ipratropium


  (Duoneb)  3 ml  1X  ONCE  3/5/17 14:00


 3/5/17 14:01 DC 3/5/17 14:09


3 ML











Allergies


Allergies





 Allergies








Coded Allergies Type Severity Reaction Last Updated Verified


 


  morphine Allergy Intermediate PER DAUGHTER, DOES NOT AGREE WITH PT 14 Yes


 


  adhesive Adverse Reaction Intermediate blisters 4/22/15 Yes


 


  aspirin Adverse Reaction Intermediate Nausea and Vomiting, "makes me sick" 4/

22/15 Yes











Physical Exam


Physical Exam





Constitutional: Well developed, well nourished, no acute distress, non-toxic 

appearance. []


HENT: Normocephalic, atraumatic, bilateral external ears normal, oropharynx 

moist, no oral exudates, nose normal. []


Eyes: PERRLA, EOMI. [] 


Neck: Normal range of motion, supple. [] 


Cardiovascular:Heart rate regular rhythm []


Lungs & Thorax:  Bilateral breath sounds clear to auscultation []


Abdomen: Bowel sounds normal, soft, no tenderness. [] 


Skin: Warm, dry, no erythema, no rash. [] 


Back: Normal ROM. [] 


Extremities: No tenderness, ROM intact, no edema. [] 


Neurologic: Alert and oriented X 3, normal motor function, normal sensory 

function, no focal deficits noted. []


Psychologic: Affect normal, judgement normal, mood normal. []





Current Patient Data


Vital Signs





 Vital Signs








  Date Time  Temp Pulse Resp B/P Pulse Ox O2 Delivery O2 Flow Rate FiO2


 


3/5/17 14:30  92 26 108/60 91 Nasal Cannula 5 


 


3/5/17 13:50 98.1       





 98.1       








Lab Values





 Laboratory Tests








Test


  3/5/17


13:55


 


White Blood Count


  8.7x10^3/uL


(4.0-11.0)


 


Red Blood Count


  3.46x10^6/uL


(3.50-5.40)  L


 


Hemoglobin


  9.6g/dL


(12.0-15.5)  L


 


Hematocrit


  30.8%


(36.0-47.0)  L


 


Mean Corpuscular Volume 89fL ()  


 


Mean Corpuscular Hemoglobin 28pg (25-35)  


 


Mean Corpuscular Hemoglobin


Concent 31g/dL (31-37)


 


 


Red Cell Distribution Width


  17.2%


(11.5-14.5)  H


 


Platelet Count


  102x10^3/uL


(140-400)  L


 


Neutrophils (%) (Auto) 90% (31-73)  H


 


Lymphocytes (%) (Auto) 7% (24-48)  L


 


Monocytes (%) (Auto) 3% (0-9)  


 


Eosinophils (%) (Auto) 0% (0-3)  


 


Basophils (%) (Auto) 1% (0-3)  


 


Neutrophils # (Auto)


  7.8x10^3uL


(1.8-7.7)  H


 


Lymphocytes # (Auto)


  0.6x10^3/uL


(1.0-4.8)  L


 


Monocytes # (Auto)


  0.2x10^3/uL


(0.0-1.1)


 


Eosinophils # (Auto)


  0.0x10^3/uL


(0.0-0.7)


 


Basophils # (Auto)


  0.0x10^3/uL


(0.0-0.2)


 


Segmented Neutrophils % 91% (35-66)  H


 


Band Neutrophils % 1% (0-9)  


 


Lymphocytes % 7% (24-48)  L


 


Monocytes % 1% (0-10)  


 


Platelet Estimate


  Decreased


(ADEQUATE)


 


Platelet Clumps, EDTA Present  


 


Anisocytosis Slight  


 


Sodium Level


  140mmol/L


(136-145)


 


Potassium Level


  4.4mmol/L


(3.5-5.1)


 


Chloride Level


  93mmol/L


()  L


 


Carbon Dioxide Level


  45mmol/L


(21-32)  H


 


Anion Gap 2 (6-14)  L


 


Blood Urea Nitrogen


  22mg/dL (7-20)


H


 


Creatinine


  1.2mg/dL


(0.6-1.0)  H


 


Estimated GFR


(Cockcroft-Gault) 42.8  


 


 


Glucose Level


  112mg/dL


(70-99)  H


 


Calcium Level


  9.1mg/dL


(8.5-10.1)





 Laboratory Tests


3/5/17 13:55








 Laboratory Tests


3/5/17 13:55














EKG


EKG


EKG as interpreted by me as sinus rhythm, rate 94, no ST-T changes, normal 

intervals, PACs and PVCs





Course & Med Decision Making


Course & Med Decision Making


Pertinent Labs and Imaging studies reviewed. (See chart for details)





Symptoms are improved after medications.  Labs are unchanged from prior.  She 

ambulated with a walker at 5L NC and felt well and would like to go home.  

Return precautions given. She understood and agrees with plan.





Dragon Disclaimer


Dragon Disclaimer


This electronic medical record was generated, in whole or in part, using a 

voice recognition dictation system.





Departure


Departure


Impression:  


 Primary Impression:  


 Nausea and vomiting


 Additional Impression:  


 COPD (chronic obstructive pulmonary disease)


Disposition:  01 HOME, SELF-CARE


Condition:  STABLE


Referrals:  


SUMMER TABOR MD (PCP)


Patient Instructions:  Nausea and Vomiting, Easy-to-Read





Additional Instructions:


Take Zofran as needed for nausea. Continue your other home medications. Follow-

up with your primary care doctor. Return for any concerns.


Scripts


Ondansetron (Zofran Odt)4 Mg Tab.rapdis1 Tab SL Q8HRS #10 TAB


   Prov:YESSENIA SERRANO MD         3/5/17





Problem Qualifiers








 Primary Impression:  


 Nausea and vomiting


 Vomiting type:  unspecified  Vomiting Intractability:  non-intractable  

Qualified Code:  R11.2 - Nausea with vomiting, unspecified


 Additional Impression:  


 COPD (chronic obstructive pulmonary disease)


 COPD type:  unspecified COPD  Qualified Code:  J44.9 - Chronic obstructive 

pulmonary disease, unspecified





YESSENIA SERRANO MD Mar 5, 2017 14:50

## 2017-03-05 NOTE — EKG
[facility]

              [address] [place] [address]

Test Date:    2017               Test Time:    13:56:38

Pat Name:     YAN COLLINS             Department:   

Patient ID:   [id number]           Room:          

Gender:       F                        Technician:   

:          [id number]               Requested By: YESSENIA SERRANO

Order Number: [id number]            Reading MD:   Joce Lemons

                                 Measurements

Intervals                              Axis          

Rate:         94                       P:            -63

CA:           150                      QRS:          -26

QRSD:         80                       T:            25

QT:           324                                    

QTc:          410                                    

                           Interpretive Statements

SINUS RHYTHM

VENTRICULAR PREMATURE COMPLEX(ES)

ATRIAL PREMATURE COMPLEX(ES)

LEFTWARD AXIS

R-S TRANSITION ZONE IN V LEADS DISPLACED TO THE LEFT

QRS(T) CONTOUR ABNORMALITY

CONSISTENT WITH INFERIOR INFARCT

PROBABLY OLD

RI6.01          Unconfirmed report



Electronically Signed On 3-6-2017 14:13:42 CST by Joce Lemons

## 2017-03-06 ENCOUNTER — HOSPITAL ENCOUNTER (INPATIENT)
Dept: HOSPITAL 61 - ER | Age: 82
LOS: 4 days | Discharge: HOME HEALTH | DRG: 683 | End: 2017-03-10
Attending: INTERNAL MEDICINE | Admitting: INTERNAL MEDICINE
Payer: MEDICARE

## 2017-03-06 VITALS — BODY MASS INDEX: 26.99 KG/M2 | WEIGHT: 120 LBS | HEIGHT: 56 IN

## 2017-03-06 VITALS — SYSTOLIC BLOOD PRESSURE: 127 MMHG | DIASTOLIC BLOOD PRESSURE: 71 MMHG

## 2017-03-06 VITALS — SYSTOLIC BLOOD PRESSURE: 90 MMHG | DIASTOLIC BLOOD PRESSURE: 52 MMHG

## 2017-03-06 VITALS — DIASTOLIC BLOOD PRESSURE: 70 MMHG | SYSTOLIC BLOOD PRESSURE: 116 MMHG

## 2017-03-06 VITALS — SYSTOLIC BLOOD PRESSURE: 117 MMHG | DIASTOLIC BLOOD PRESSURE: 61 MMHG

## 2017-03-06 DIAGNOSIS — M48.56XA: ICD-10-CM

## 2017-03-06 DIAGNOSIS — Z98.49: ICD-10-CM

## 2017-03-06 DIAGNOSIS — Z79.899: ICD-10-CM

## 2017-03-06 DIAGNOSIS — F41.9: ICD-10-CM

## 2017-03-06 DIAGNOSIS — J90: ICD-10-CM

## 2017-03-06 DIAGNOSIS — Z87.11: ICD-10-CM

## 2017-03-06 DIAGNOSIS — N20.0: ICD-10-CM

## 2017-03-06 DIAGNOSIS — J44.9: ICD-10-CM

## 2017-03-06 DIAGNOSIS — M81.0: ICD-10-CM

## 2017-03-06 DIAGNOSIS — F32.9: ICD-10-CM

## 2017-03-06 DIAGNOSIS — E44.0: ICD-10-CM

## 2017-03-06 DIAGNOSIS — Z88.6: ICD-10-CM

## 2017-03-06 DIAGNOSIS — Z88.8: ICD-10-CM

## 2017-03-06 DIAGNOSIS — D64.9: ICD-10-CM

## 2017-03-06 DIAGNOSIS — I48.91: ICD-10-CM

## 2017-03-06 DIAGNOSIS — K59.00: ICD-10-CM

## 2017-03-06 DIAGNOSIS — E87.3: ICD-10-CM

## 2017-03-06 DIAGNOSIS — Z82.49: ICD-10-CM

## 2017-03-06 DIAGNOSIS — Z90.49: ICD-10-CM

## 2017-03-06 DIAGNOSIS — K21.9: ICD-10-CM

## 2017-03-06 DIAGNOSIS — Z90.710: ICD-10-CM

## 2017-03-06 DIAGNOSIS — E03.9: ICD-10-CM

## 2017-03-06 DIAGNOSIS — Z88.5: ICD-10-CM

## 2017-03-06 DIAGNOSIS — Z85.118: ICD-10-CM

## 2017-03-06 DIAGNOSIS — Z92.3: ICD-10-CM

## 2017-03-06 DIAGNOSIS — I27.2: ICD-10-CM

## 2017-03-06 DIAGNOSIS — Z99.81: ICD-10-CM

## 2017-03-06 DIAGNOSIS — Z87.891: ICD-10-CM

## 2017-03-06 DIAGNOSIS — N17.9: Primary | ICD-10-CM

## 2017-03-06 DIAGNOSIS — J45.909: ICD-10-CM

## 2017-03-06 DIAGNOSIS — Z87.01: ICD-10-CM

## 2017-03-06 DIAGNOSIS — J96.10: ICD-10-CM

## 2017-03-06 DIAGNOSIS — K44.9: ICD-10-CM

## 2017-03-06 LAB
ANION GAP SERPL CALC-SCNC: (no result) MMOL/L (ref 6–14)
BASOPHILS # BLD AUTO: 0 X10^3/UL (ref 0–0.2)
BASOPHILS NFR BLD: 0 % (ref 0–3)
BUN SERPL-MCNC: 25 MG/DL (ref 7–20)
CALCIUM SERPL-MCNC: 8.8 MG/DL (ref 8.5–10.1)
CHLORIDE SERPL-SCNC: 95 MMOL/L (ref 98–107)
CO2 SERPL-SCNC: > 45 MMOL/L (ref 21–32)
CREAT SERPL-MCNC: 1.5 MG/DL (ref 0.6–1)
EOSINOPHIL NFR BLD: 1 % (ref 0–3)
ERYTHROCYTE [DISTWIDTH] IN BLOOD BY AUTOMATED COUNT: 16.9 % (ref 11.5–14.5)
GFR SERPLBLD BASED ON 1.73 SQ M-ARVRAT: 33.1 ML/MIN
GLUCOSE SERPL-MCNC: 106 MG/DL (ref 70–99)
HCT VFR BLD CALC: 28.4 % (ref 36–47)
HGB BLD-MCNC: 9.2 G/DL (ref 12–15.5)
LYMPHOCYTES # BLD: 1.7 X10^3/UL (ref 1–4.8)
LYMPHOCYTES NFR BLD AUTO: 17 % (ref 24–48)
MCH RBC QN AUTO: 28 PG (ref 25–35)
MCHC RBC AUTO-ENTMCNC: 33 G/DL (ref 31–37)
MCV RBC AUTO: 86 FL (ref 79–100)
MONOCYTES NFR BLD: 10 % (ref 0–9)
NEUTROPHILS NFR BLD AUTO: 72 % (ref 31–73)
PLATELET # BLD AUTO: 108 X10^3/UL (ref 140–400)
POTASSIUM SERPL-SCNC: 3.5 MMOL/L (ref 3.5–5.1)
RBC # BLD AUTO: 3.32 X10^6/UL (ref 3.5–5.4)
SODIUM SERPL-SCNC: 142 MMOL/L (ref 136–145)
WBC # BLD AUTO: 10.1 X10^3/UL (ref 4–11)

## 2017-03-06 PROCEDURE — 94760 N-INVAS EAR/PLS OXIMETRY 1: CPT

## 2017-03-06 PROCEDURE — 93005 ELECTROCARDIOGRAM TRACING: CPT

## 2017-03-06 PROCEDURE — 96374 THER/PROPH/DIAG INJ IV PUSH: CPT

## 2017-03-06 PROCEDURE — 74150 CT ABDOMEN W/O CONTRAST: CPT

## 2017-03-06 PROCEDURE — 83690 ASSAY OF LIPASE: CPT

## 2017-03-06 PROCEDURE — 94640 AIRWAY INHALATION TREATMENT: CPT

## 2017-03-06 PROCEDURE — 36415 COLL VENOUS BLD VENIPUNCTURE: CPT

## 2017-03-06 PROCEDURE — 72040 X-RAY EXAM NECK SPINE 2-3 VW: CPT

## 2017-03-06 PROCEDURE — 80162 ASSAY OF DIGOXIN TOTAL: CPT

## 2017-03-06 PROCEDURE — 80048 BASIC METABOLIC PNL TOTAL CA: CPT

## 2017-03-06 PROCEDURE — 94250: CPT

## 2017-03-06 PROCEDURE — 82150 ASSAY OF AMYLASE: CPT

## 2017-03-06 PROCEDURE — 80053 COMPREHEN METABOLIC PANEL: CPT

## 2017-03-06 PROCEDURE — 85027 COMPLETE CBC AUTOMATED: CPT

## 2017-03-06 PROCEDURE — 85007 BL SMEAR W/DIFF WBC COUNT: CPT

## 2017-03-06 PROCEDURE — 87641 MR-STAPH DNA AMP PROBE: CPT

## 2017-03-06 RX ADMIN — LEVOTHYROXINE SODIUM SCH MCG: 88 TABLET ORAL at 18:18

## 2017-03-06 RX ADMIN — ESCITALOPRAM OXALATE SCH MG: 10 TABLET ORAL at 18:18

## 2017-03-06 RX ADMIN — TRAZODONE HYDROCHLORIDE SCH MG: 100 TABLET ORAL at 20:51

## 2017-03-06 RX ADMIN — DIGOXIN SCH MCG: 0.12 TABLET ORAL at 18:18

## 2017-03-06 RX ADMIN — LEVOFLOXACIN SCH MG: 250 TABLET, FILM COATED ORAL at 18:18

## 2017-03-06 RX ADMIN — IPRATROPIUM BROMIDE AND ALBUTEROL SULFATE SCH ML: .5; 3 SOLUTION RESPIRATORY (INHALATION) at 20:29

## 2017-03-06 RX ADMIN — VITAMIN D, TAB 1000IU (100/BT) SCH UNIT: 25 TAB at 18:18

## 2017-03-06 RX ADMIN — Medication SCH MCG: at 18:18

## 2017-03-06 RX ADMIN — DOCUSATE SODIUM SCH MG: 100 CAPSULE, LIQUID FILLED ORAL at 18:18

## 2017-03-06 RX ADMIN — SENNOSIDES A AND B SCH MG: 8.6 TABLET, FILM COATED ORAL at 18:18

## 2017-03-06 RX ADMIN — BACITRACIN SCH MLS/HR: 5000 INJECTION, POWDER, FOR SOLUTION INTRAMUSCULAR at 18:16

## 2017-03-06 RX ADMIN — ENOXAPARIN SODIUM SCH MG: 30 INJECTION SUBCUTANEOUS at 20:51

## 2017-03-06 RX ADMIN — PREDNISONE SCH MG: 10 TABLET ORAL at 18:18

## 2017-03-06 RX ADMIN — ONDANSETRON SCH MG: 4 TABLET, ORALLY DISINTEGRATING ORAL at 21:00

## 2017-03-06 NOTE — DS
DATE OF DISCHARGE:  03/03/2017



REASON FOR ADMISSION TO THE HOSPITAL:  COPD with exacerbation.



CONSULTATION:  Anuradha Madrid MD.



PROCEDURE DONE:  Video swallow study.



HOSPITAL COURSE:  The patient is an 84-year-old female with history of COPD, on

oxygen.  She also has lung cancer, had a radiation treatment.  She was having

shortness of breath, came to the Emergency Room, x-ray shows infiltrates, was

treated with IV antibiotics, IV Solu-Medrol, oxygen and breathing treatments. 

The patient was seen by Dr. Madrid, Pulmonology, and it was felt that she could

have a silent aspiration component, had a video swallow study, which shows

several episodes of laryngeal penetration without aspiration and she was seen by

speech, recommended modified thickened liquids.  On the whole, patient's

condition improved and she was anxious to go home and she was discharged.  She

is a DNR.



FINAL DIAGNOSES:

1.  Chronic obstructive pulmonary disease with acute exacerbation.

2.  Possible silent aspiration.

3.  History of lung cancer, had radiation treatment, had some scarring.

4.  Osteoporosis with compression fracture of the spine, had vertebroplasties.

5.  Hypothyroidism.

6.  Anxiety.



DISPOSITION:  Home.



MEDICATIONS  See MRAD for discharge medications.

 



______________________________

SUMMER TABOR MD



DR:  RAGINI/aidan  JOB#:  [id number] / [id number]

DD:  03/06/2017 10:11  DT:  03/06/2017 19:44

## 2017-03-06 NOTE — PHYS DOC
Past Medical History


Past Medical History:  A-Fib, Anxiety, Asthma, Bronchitis, Cancer, COPD, 

Depression, Hypothyroid, Pneumonia, Other


Additional Past Medical Histor:  chronic back, lung ca; hernia, HEART DISEASE, 

EMPHESEMA, Osteoporosis


Past Surgical History:  Appendectomy, Cholecystectomy, , Hysterectomy, 

Tonsillectomy, Other


Additional Past Surgical Histo:  back X2, hernia x 3


Additional Information:  


quit smoking 1970


Alcohol Use:  None


Drug Use:  None





Adult General


Chief Complaint


Chief Complaint:  OTHER COMPLAINTS





HPI


HPI


Patient is a 84  year old female who presents by EMS for dyspnea and 

lightheadedness since her oxygen concentrator was malfunctioning.  She notes 

feeling better since being placed on oxygen by EMS and here.  She states she 

continued to have intermittent emesis through the night, but none yesterday 

afternoon. She currently has mild nausea.  She denies chest pain, headache, 

vision changes, numbness, tingling, weakness.





Review of Systems


Review of Systems





Constitutional: Denies fever or chills []


Eyes: Denies change in visual acuity, redness, or eye pain []


HENT: Denies nasal congestion or sore throat []


Respiratory: Denies cough  []


Cardiovascular: No additional information not addressed in HPI []


GI: Denies abdominal pain, bloody stools or diarrhea []


: Denies dysuria or hematuria []


Musculoskeletal: Denies back pain or joint pain []


Integument: Denies rash or skin lesions []


Neurologic: Denies headache, focal weakness or sensory changes []


Endocrine: Denies polyuria or polydipsia []





Current Medications


Current Medications





 Current Medications








 Medications


  (Trade)  Dose


 Ordered  Sig/[facility]  Start Time


 Stop Time Status Last Admin


Dose Admin


 


 Ondansetron HCl 4


 mg  4 mg  1X  ONCE  3/6/17 09:00


 3/6/17 09:01 DC 3/6/17 09:26


4 MG


 


 Sodium Chloride


  (Iv Sodium


 Chloride 0.9%


 500ml Bag)  500 ml @ 


 500 mls/hr  1X  ONCE  3/6/17 09:30


 3/6/17 10:29 DC 3/6/17 09:25


500 MLS/HR











Allergies


Allergies





 Allergies








Coded Allergies Type Severity Reaction Last Updated Verified


 


  morphine Allergy Intermediate PER DAUGHTER, DOES NOT AGREE WITH PT 14 Yes


 


  adhesive Adverse Reaction Intermediate blisters 4/22/15 Yes


 


  aspirin Adverse Reaction Intermediate Nausea and Vomiting, "makes me sick" 4/

22/15 Yes











Physical Exam


Physical Exam





Constitutional: Well developed, well nourished, no acute distress, non-toxic 

appearance. []


HENT: Normocephalic, atraumatic, bilateral external ears normal, oropharynx 

moist, no oral exudates, nose normal. []


Eyes: PERRLA, EOMI. [] 


Neck: Normal range of motion, supple, no stridor. [] 


Cardiovascular:Heart rate regular rhythm []


Lungs & Thorax:  Bilateral breath sounds clear to auscultation []


Abdomen: Bowel sounds normal, soft, no tenderness. [] 


Skin: Warm, dry, no erythema, no rash. [] 


Back: No tenderness, no CVA tenderness. [] 


Extremities: No tenderness, ROM intact, no edema. [] 


Neurologic: Alert and oriented X 3, normal motor function, normal sensory 

function, no focal deficits noted. []


Psychologic: Affect normal, judgement normal, mood normal. []





Current Patient Data


Vital Signs





 Vital Signs








  Date Time  Temp Pulse Resp B/P Pulse Ox O2 Delivery O2 Flow Rate FiO2


 


3/6/17 10:00  90 16 110/66 99 Nasal Cannula 5 


 


3/6/17 08:36 98.9       





 98.9       








Lab Values





 Laboratory Tests








Test


  3/6/17


08:50


 


White Blood Count


  10.1x10^3/uL


(4.0-11.0)


 


Red Blood Count


  3.32x10^6/uL


(3.50-5.40)  L


 


Hemoglobin


  9.2g/dL


(12.0-15.5)  L


 


Hematocrit


  28.4%


(36.0-47.0)  L


 


Mean Corpuscular Volume 86fL ()  


 


Mean Corpuscular Hemoglobin 28pg (25-35)  


 


Mean Corpuscular Hemoglobin


Concent 33g/dL (31-37)


 


 


Red Cell Distribution Width


  16.9%


(11.5-14.5)  H


 


Platelet Count


  108x10^3/uL


(140-400)  L


 


Neutrophils (%) (Auto) 72% (31-73)  


 


Lymphocytes (%) (Auto) 17% (24-48)  L


 


Monocytes (%) (Auto) 10% (0-9)  H


 


Eosinophils (%) (Auto) 1% (0-3)  


 


Basophils (%) (Auto) 0% (0-3)  


 


Neutrophils # (Auto)


  7.3x10^3uL


(1.8-7.7)


 


Lymphocytes # (Auto)


  1.7x10^3/uL


(1.0-4.8)


 


Monocytes # (Auto)


  1.0x10^3/uL


(0.0-1.1)


 


Eosinophils # (Auto)


  0.1x10^3/uL


(0.0-0.7)


 


Basophils # (Auto)


  0.0x10^3/uL


(0.0-0.2)


 


Sodium Level


  142mmol/L


(136-145)


 


Potassium Level


  3.5mmol/L


(3.5-5.1)


 


Chloride Level


  95mmol/L


()  L


 


Carbon Dioxide Level


  > 45mmol/L


(21-32)  H


 


Anion Gap  (6-14)  


 


Blood Urea Nitrogen


  25mg/dL (7-20)


H


 


Creatinine


  1.5mg/dL


(0.6-1.0)  H


 


Estimated GFR


(Cockcroft-Gault) 33.1  


 


 


Glucose Level


  106mg/dL


(70-99)  H


 


Calcium Level


  8.8mg/dL


(8.5-10.1)





 Laboratory Tests


3/6/17 08:50








 Laboratory Tests


3/6/17 08:50














Course & Med Decision Making


Course & Med Decision Making


Pertinent Labs and Imaging studies reviewed. (See chart for details)





Laboratory evaluation shows worsening metabolic alkalosis.  Given her repeat 

presentation for dyspnea and emesis and concern for inability to care for self 

at home, she will be admitted. Her oxygen compressor was not brought with her 

to be evaluated at this time.  Discussed case with Dr. Couch, who agrees to 

admit.





Dragon Disclaimer


Dragon Disclaimer


This electronic medical record was generated, in whole or in part, using a 

voice recognition dictation system.





Departure


Departure


Impression:  


 Primary Impression:  


 Nausea and vomiting


 Additional Impressions:  


 COPD (chronic obstructive pulmonary disease)


 Metabolic alkalosis


Disposition:   ADMITTED AS INPATIENT


Condition:  STABLE


Referrals:  


SUMMER COUCH MD (PCP)





Problem Qualifiers








 Primary Impression:  


 Nausea and vomiting


 Vomiting type:  unspecified  Vomiting Intractability:  non-intractable  

Qualified Code:  R11.2 - Nausea with vomiting, unspecified


 Additional Impressions:  


 COPD (chronic obstructive pulmonary disease)


 COPD type:  unspecified COPD  Qualified Code:  J44.9 - Chronic obstructive 

pulmonary disease, unspecified





YESSENIA SERRANO MD Mar 6, 2017 09:21

## 2017-03-06 NOTE — ACF
Admit Criteria Forms


                                                   VOMITING





Clinical Indications for Admission to Inpatient Care





                                                                             (

Place 'X' for any and all applicable criteria):





Admission is indicated for ANY ONE of the following(1)(2)(3): 


[X]I.    Inpatient admission required rather than observation care because of 

ANY ONE of the  following:


         [ ]i)        Hemodynamic instability that is severe or persistent 


         [ ]ii)       Vomiting that is severe or persistent


         [ ]iii)      Severe electrolyte abnormalities requiring inpatient care


         [ ]iv)      Severe pain requiring acute inpatient management


         [ ]v)       High fever or infection requiring inpatient admission as 

indicated by ANY ONE of the following(7)(8):


                     [ ]1)   Appropriate outpatient or observation care 

antimicrobial treatment unavailable, not effective, or not feasible 


                     [ ]2)   Documented bacteremia


                     [ ]3)   Temp >104.9 degrees F (40.5 degrees C) (oral)


                     [ ]4)   Temp >103.1 degrees F (39.5 C) (oral) or <96.8 

degrees F (36 C) (rectal) that does not respond to all emergency treatment 

measures


         [ ]vi)      Acute renal failure


         [ ]vii)     IV fluid to replace significant ongoing losses (greater 

than 3 L/m2 per day)


         [ ]viii)    Parenteral nutrition regimen that must be implemented on 

inpatient basis


         [X]ix)     Other condition, treatment or monitoring requiring 

inpatient admission  


[ ]II.    Complete or partial gastrointestinal obstruction


[ ]III.   Other cause of vomiting requiring hospitalization (eg, poisoning, 

increased intracranial pressure)


[ ]IV.   Vomiting due to significant metabolic derangement (eg, severe 

hypercalcemia, diabetic ketoacidosis)











Extended stay beyond goal length of stay may be needed for(1)(4):


[ ]a)     Severe vomiting


[ ]b)     Persistent vomiting, vital sign changes, severe electrolyte imbalance

, or diagnosed cause of vomiting that


           requires continued hospitalization (eg, gastrointestinal obstruction

, increased intracranial pressure)


[ ]c)     Surgery to treat identified causes of vomiting (eg, bowel obstruction

, intracranial process)


[ ]d)     Comorbid illness that requires inpatient care (eg, acute heart failure

, renal failure)


[ ]e)     Need for inpatient endoscopy








The original [facility] content created by Dillon Goel has been revised. 


The portions of the content which have been revised are identified through the 

use of italic text or in bold, and Dillon Goel 


has neither reviewed nor approved the modified material. All other unmodified 

content is copyright  [facility].





Please see references footnoted in the original [facility] edition 

2016








MARGARETTE DIAZ Mar 6, 2017 10:03

## 2017-03-07 VITALS — SYSTOLIC BLOOD PRESSURE: 121 MMHG | DIASTOLIC BLOOD PRESSURE: 50 MMHG

## 2017-03-07 VITALS — DIASTOLIC BLOOD PRESSURE: 58 MMHG | SYSTOLIC BLOOD PRESSURE: 109 MMHG

## 2017-03-07 VITALS — DIASTOLIC BLOOD PRESSURE: 56 MMHG | SYSTOLIC BLOOD PRESSURE: 115 MMHG

## 2017-03-07 VITALS — SYSTOLIC BLOOD PRESSURE: 94 MMHG | DIASTOLIC BLOOD PRESSURE: 42 MMHG

## 2017-03-07 VITALS — SYSTOLIC BLOOD PRESSURE: 119 MMHG | DIASTOLIC BLOOD PRESSURE: 61 MMHG

## 2017-03-07 VITALS — SYSTOLIC BLOOD PRESSURE: 90 MMHG | DIASTOLIC BLOOD PRESSURE: 50 MMHG

## 2017-03-07 LAB
ALBUMIN SERPL-MCNC: 2.6 G/DL (ref 3.4–5)
ALBUMIN/GLOB SERPL: 0.7 {RATIO} (ref 1–1.7)
ALP SERPL-CCNC: 53 U/L (ref 46–116)
ALT SERPL-CCNC: 15 U/L (ref 14–59)
AMYLASE SERPL-CCNC: 58 U/L (ref 25–115)
ANION GAP SERPL CALC-SCNC: -1 MMOL/L (ref 6–14)
AST SERPL-CCNC: 29 U/L (ref 15–37)
BILIRUB SERPL-MCNC: 0.4 MG/DL (ref 0.2–1)
BUN SERPL-MCNC: 20 MG/DL (ref 7–20)
BUN/CREAT SERPL: 18 (ref 6–20)
CALCIUM SERPL-MCNC: 8.6 MG/DL (ref 8.5–10.1)
CHLORIDE SERPL-SCNC: 99 MMOL/L (ref 98–107)
CO2 SERPL-SCNC: 42 MMOL/L (ref 21–32)
CREAT SERPL-MCNC: 1.1 MG/DL (ref 0.6–1)
GFR SERPLBLD BASED ON 1.73 SQ M-ARVRAT: 47.3 ML/MIN
GLOBULIN SER-MCNC: 3.8 G/DL (ref 2.2–3.8)
GLUCOSE SERPL-MCNC: 112 MG/DL (ref 70–99)
POTASSIUM SERPL-SCNC: 4.7 MMOL/L (ref 3.5–5.1)
PROT SERPL-MCNC: 6.4 G/DL (ref 6.4–8.2)
SODIUM SERPL-SCNC: 140 MMOL/L (ref 136–145)

## 2017-03-07 RX ADMIN — LEVOFLOXACIN SCH MG: 250 TABLET, FILM COATED ORAL at 05:55

## 2017-03-07 RX ADMIN — ONDANSETRON SCH MG: 4 TABLET, ORALLY DISINTEGRATING ORAL at 05:55

## 2017-03-07 RX ADMIN — LEVOTHYROXINE SODIUM SCH MCG: 88 TABLET ORAL at 05:55

## 2017-03-07 RX ADMIN — TRAZODONE HYDROCHLORIDE SCH MG: 100 TABLET ORAL at 20:50

## 2017-03-07 RX ADMIN — DIGOXIN SCH MCG: 0.12 TABLET ORAL at 07:55

## 2017-03-07 RX ADMIN — IPRATROPIUM BROMIDE AND ALBUTEROL SULFATE SCH ML: .5; 3 SOLUTION RESPIRATORY (INHALATION) at 20:43

## 2017-03-07 RX ADMIN — PREDNISONE SCH MG: 10 TABLET ORAL at 07:57

## 2017-03-07 RX ADMIN — SENNOSIDES A AND B SCH MG: 8.6 TABLET, FILM COATED ORAL at 07:57

## 2017-03-07 RX ADMIN — ONDANSETRON SCH MG: 4 TABLET, ORALLY DISINTEGRATING ORAL at 12:43

## 2017-03-07 RX ADMIN — ENOXAPARIN SODIUM SCH MG: 30 INJECTION SUBCUTANEOUS at 20:50

## 2017-03-07 RX ADMIN — IPRATROPIUM BROMIDE AND ALBUTEROL SULFATE SCH ML: .5; 3 SOLUTION RESPIRATORY (INHALATION) at 16:53

## 2017-03-07 RX ADMIN — IPRATROPIUM BROMIDE AND ALBUTEROL SULFATE SCH ML: .5; 3 SOLUTION RESPIRATORY (INHALATION) at 08:45

## 2017-03-07 RX ADMIN — PANTOPRAZOLE SODIUM SCH MG: 40 TABLET, DELAYED RELEASE ORAL at 12:43

## 2017-03-07 RX ADMIN — ESCITALOPRAM OXALATE SCH MG: 10 TABLET ORAL at 07:56

## 2017-03-07 RX ADMIN — ONDANSETRON SCH MG: 4 TABLET, ORALLY DISINTEGRATING ORAL at 20:50

## 2017-03-07 RX ADMIN — OXYCODONE HYDROCHLORIDE AND ACETAMINOPHEN PRN TAB: 10; 325 TABLET ORAL at 20:51

## 2017-03-07 RX ADMIN — BACITRACIN SCH MLS/HR: 5000 INJECTION, POWDER, FOR SOLUTION INTRAMUSCULAR at 07:50

## 2017-03-07 RX ADMIN — VITAMIN D, TAB 1000IU (100/BT) SCH UNIT: 25 TAB at 07:56

## 2017-03-07 RX ADMIN — BACITRACIN SCH MLS/HR: 5000 INJECTION, POWDER, FOR SOLUTION INTRAMUSCULAR at 20:54

## 2017-03-07 RX ADMIN — ALPRAZOLAM PRN MG: 0.25 TABLET ORAL at 20:51

## 2017-03-07 RX ADMIN — IPRATROPIUM BROMIDE AND ALBUTEROL SULFATE SCH ML: .5; 3 SOLUTION RESPIRATORY (INHALATION) at 12:00

## 2017-03-07 RX ADMIN — DOCUSATE SODIUM SCH MG: 100 CAPSULE, LIQUID FILLED ORAL at 07:53

## 2017-03-07 RX ADMIN — Medication SCH MCG: at 07:56

## 2017-03-07 RX ADMIN — OXYCODONE HYDROCHLORIDE AND ACETAMINOPHEN PRN TAB: 10; 325 TABLET ORAL at 11:52

## 2017-03-07 RX ADMIN — POLYETHYLENE GLYCOL 3350 SCH GM: 17 POWDER, FOR SOLUTION ORAL at 12:43

## 2017-03-07 NOTE — RAD
EXAM: Cervical spine 3 views.



HISTORY: Neck pain. Multiple falls.



COMPARISON: None.



FINDINGS: There is a mild levocurvature at the cervicothoracic junction,

possibly positional or compensatory for thoracic dextrocurvature. The normal

lordosis is straightened. Osteopenia is moderate to severe. No clear fractures

are identified. There is no prevertebral soft tissue swelling. Degenerative

disc disease is moderate to severe from C4 through T1.



There are atherosclerotic calcifications of the aorta. There are diffuse

vertebroplasty changes within the visualized portions of the mid thoracic

spine.



IMPRESSION:

1. Degenerative disc disease is moderate to severe from C4 through T1. 

2. Moderate to severe osteopenia. No fractures are identified. CT is more

sensitive if there is persistent concern.

## 2017-03-07 NOTE — HP
ADMIT DATE:  2017



PATIENT LOCATION:  [id number]



REASON FOR ADMISSION TO THE HOSPITAL:  Nausea and vomiting, persistent.



HISTORY OF PRESENT ILLNESS:  The patient is an 84-year-old female who complains

of nausea and vomiting.  She says that her oxygen concentration was

malfunctioning at home, and she has a history of large hiatal hernia, not able

to keep anything down, and she was in the Emergency Room the day prior to that. 

She was treated and sent home, and this is her second visit to the ER for nausea

and vomiting.



PAST MEDICAL HISTORY:  She has a history of a large hiatal hernia, atrial

fibrillation, anxiety; COPD, on home oxygen, and hypothyroidism.  She has lung

cancer, osteoporosis, and compression fracture of the spine.



PAST SURGICAL HISTORY:  Appendectomy, gallbladder surgery,  x 2,

hysterectomy, tonsillectomy, back surgery, and hernia surgery.



ALLERGIES:  ASPIRIN, MORPHINE, AND ADHESIVE TAPE.



MEDICATIONS AT HOME:  She is on Xanax 0.25 daily, vitamin D 1000 units daily,

B12 1000 mcg, digoxin 125 mcg daily, Colace 100 mg daily, citalopram 10 mg

daily, DuoNeb 4 times daily, Levaquin 250 mg daily, levothyroxine 88 mcg daily,

Zofran 4 mg p.r.n., oxycodone 5/325 q. 6, she is on tapering dose of prednisone

for COPD exacerbation, last week 5 mg daily; senna 1 daily, and trazodone 100 mg

daily.



PERSONAL HISTORY:  Used to smoke in the past, 1 pack for at least 40 years.  She

quit 5 years ago.  She is on home oxygen.  She denies alcohol or street drugs.



FAMILY HISTORY:  Positive for hypertension and heart disease.



SOCIAL HISTORY:  Lives at home.  She has oxygen concentrator and walker,

ambulates with a walker.



REVIEW OF SYMPTOMS:  Denies any chest pain, has some shortness of breath, but

mostly nausea, vomiting, not able to keep anything down.



PHYSICAL EXAMINATION:

VITAL SIGNS:  Examination at the time of admission shows a temperature 98, pulse

93, respirations 20, blood pressure 82/52, and 96% on 5 liters.

HEENT:  Head is atraumatic.  Pupils equal.  She has dentures.

NECK:  Supple.  Thyroid not enlarged.  JVD not elevated.

CHEST:  COPD pattern.

CARDIOVASCULAR:  S1 and S2.

LUNGS:  Diminished breath sounds.

ABDOMEN:  Scars of multiple abdominal surgeries.  Soft, has slight tenderness to

epigastric palpation.  No rebound.  Bowel sounds are present.

EXTERNAL GENITALIA:  No Gusman.

RECTAL:  Deferred.

EXTREMITIES:  No calf tenderness, no edema.  Pulses 1+.

NEUROLOGIC:  Cranial nerves intact.  Power 5 out of 5 in the extremities. 

Kyphosis secondary to osteoporosis and compression fracture of the spine.



LABORATORY DATA:  Shows a white count of 10, hemoglobin 9.2, and platelets 108. 

Electrolytes show sodium 142, potassium 3.5, chloride 95, bicarbonate more than

45, BUN 25, creatinine 1.5, glucose 106, amylase 58, and lipase 157.  LFTs were

normal, and the patient recently had a video swallow which showed some silent

aspiration.



FINAL IMPRESSION:

1.  Persistent nausea and vomiting.

2.  History of large hiatal hernia.

3.  Mild silent aspiration.

4.  Chronic obstructive pulmonary disease, on home oxygen.

5.  History of lung cancer, had radiation treatment 5 years ago.

6.  Osteoporosis with compression of the spine, had vertebroplasties in the

past.

7.  Multiple abdominal surgeries as mentioned above.



PLAN:  At this time, admit to hospital and hydrate with IV fluids, Zofran for

nausea, Gastroenterology consult.  Clear liquid diet and CT scan of the abdomen

and pelvis, IV hydration and see how the patient's condition improves.

 



______________________________

SUMMER TABOR MD



DR:  RAGINI/aidan  JOB#:  [id number] / [id number]

DD:  2017 10:06  DT:  2017 11:00

## 2017-03-07 NOTE — PDOC2
GI CONSULT


Reason For Consult:


N/v





HPI:


HPI:


83 y/o female admitted w/ n/v.  History is a little difficult, a lot obtained 

from chart/staff.  Three visits to ER this month for SOA, weakness/dizziness, 

and vomiting.  She indicates this is a recurrent issue for her, although she 

cannot tell me how often or for how long this has been bothering her.  She 

"spits up" everything she eats or drinks.  Regarding timing, she says sometimes 

vomiting occurs immediately after swallowing, but sometimes it occurs awhile 

after eating.  She has occasional heartburn treated w/ an OTC medication PRN (

doesn't know name/type).  She has occasional constipation treated the same way.

  Per aide, just h ad a hard stool.  Denies weight loss.  Currently has some 

RLQ pain but isn't sure what makes it better or worse.  Recalls having previous 

EGD and colonoscopy, although not sure where or when.  Remote h/o "ulcer," w/ 

additional h/o hiatal hernia.  Denies bleeding.





PMH:


PMH:


per chart - A Fib, COPD/chronic resp failure on home O2, pneumonia, CHF, TIA, 

GERD, ?PUD, ?pancreatitis, hiatal hernia, SBO/ileus, anemia, hypothyroidism, 

lung cancer s/p radiation, osteoporosis, compression fracture, appendectomy, 

cholecystectomy, , hysterectomy, tonsillectomy, back surgery, (?ventral

) hernia repair, cataract extraction, kyphoplasty





FH:


Family History:  No pertinent hx





Social History:


Smoke:  Quit


ALCOHOL:  none


Drugs:  None





ROS:





GEN: Denies fevers, chills, sweats


HEENT: Denies blurred vision, sore throat


CV: Denies chest pain


RESP: +SOA


GI: Per HPI


: Denies hematuria, dysuria


ENDO: Denies weight changes


NEURO: Denies confusion, dizziness


MSK: +weakness


SKIN: Denies jaundice, pruritus





VItals:


Vitals:





 Vital Signs








  Date Time  Temp Pulse Resp B/P Pulse Ox O2 Delivery O2 Flow Rate FiO2


 


3/7/17 10:50 98.1 87 17 119/61 92 Nasal Cannula 5.0 





 98.1       











Labs:


Labs:





Laboratory Tests








Test


  3/7/17


03:30


 


Sodium Level


  140mmol/L


(136-145)


 


Potassium Level


  4.7mmol/L


(3.5-5.1)


 


Chloride Level


  99mmol/L


()


 


Carbon Dioxide Level


  42mmol/L


(21-32)


 


Anion Gap -1 (6-14) 


 


Blood Urea Nitrogen 20mg/dL (7-20) 


 


Creatinine


  1.1mg/dL


(0.6-1.0)


 


Estimated GFR


(Cockcroft-Gault) 47.3 


 


 


BUN/Creatinine Ratio 18 (6-20) 


 


Glucose Level


  112mg/dL


(70-99)


 


Calcium Level


  8.6mg/dL


(8.5-10.1)


 


Total Bilirubin


  0.4mg/dL


(0.2-1.0)


 


Aspartate Amino Transf


(AST/SGOT) 29U/L (15-37) 


 


 


Alanine Aminotransferase


(ALT/SGPT) 15U/L (14-59) 


 


 


Alkaline Phosphatase 53U/L () 


 


Total Protein


  6.4g/dL


(6.4-8.2)


 


Albumin


  2.6g/dL


(3.4-5.0)


 


Albumin/Globulin Ratio 0.7 (1.0-1.7) 


 


Amylase Level 58U/L () 


 


Lipase


  157U/L


()











Allergies:


Coded Allergies:  


     morphine (Verified  Allergy, Intermediate, PER DAUGHTER, DOES NOT AGREE 

WITH PT, 14)


     adhesive (Verified  Adverse Reaction, Intermediate, blisters, 4/22/15)


     aspirin (Verified  Adverse Reaction, Intermediate, Nausea and Vomiting, 

"makes me sick", 4/22/15)





Medications:





Current Medications








 Medications


  (Trade)  Dose


 Ordered  Sig/Franky


 Route


 PRN Reason  Start Time


 Stop Time Status Last Admin


Dose Admin


 


 Cyanocobalamin


  (Vitamin B-12)  1,000 mcg  DAILY


 PO


   3/6/17 18:00


    3/7/17 07:56


 


 


 Digoxin


  (Lanoxin)  125 mcg  DAILY


 PO


   3/6/17 18:00


    3/7/17 07:55


 


 


 Docusate Sodium


  (Colace)  100 mg  DAILY


 PO


   3/6/17 18:00


    3/7/17 07:53


 


 


 Escitalopram


 Oxalate


  (Lexapro)  20 mg  DAILY


 PO


   3/6/17 18:00


    3/7/17 07:56


 


 


 Albuterol/


 Ipratropium


  (Duoneb)  3 ml  RTQID


 NEB


   3/6/17 20:00


    3/7/17 08:45


 


 


 Levofloxacin


  (Levaquin)  250 mg  DAILY06


 PO


   3/6/17 18:00


    3/7/17 05:55


 


 


 Levothyroxine


 Sodium


  (Synthroid)  88 mcg  DAILY07


 PO


   3/6/17 18:00


    3/7/17 05:55


 


 


 Ondansetron HCl


  (Zofran Odt)  4 mg  Q8HRS


 PO


   3/6/17 22:00


    3/7/17 05:55


 


 


 Sennosides


  (Senna)  8.6 mg  DAILY


 PO


   3/6/17 18:00


    3/7/17 07:57


 


 


 Trazodone HCl


  (Desyrel)  100 mg  QHS


 PO


   3/6/17 21:00


    3/6/17 20:51


 


 


 Vitamin D


  (Vitamin D3)  1,000 unit  DAILY


 PO


   3/6/17 18:00


    3/7/17 07:56


 


 


 Prednisone 50 mg  50 mg  DAILY


 PO


   3/6/17 18:00


    3/7/17 07:57


 


 


 Sodium Chloride


  (Iv Sodium


 Chloride 0.9%


 1000ml Bag)  1,000 ml @ 


 75 mls/hr  [id number]


 IV


   3/6/17 19:00


    3/7/17 07:50


 











Imaging:


Imaging:


C-spine X-ray


PENDING





CT A/P w/ contrast


[facility]


[facility]ression:


1. Several episodes of laryngeal penetration without aspiration seen with thin 

consistency barium.





Chest CT 2016


IMPRESSION:


1. Emphysema with bilateral parenchymal scarring.


2. Streaky bibasilar atelectasis/infiltrate evident on 2015 has largely 

cleared.


3. Moderate-sized hiatal hernia.


4. Numerous old thoracic vertebral compression fractures with vertebroplasty 

changes at multiple levels.


5. New L1 vertebral compression fracture with features raising the possibility 

of a pathologic fracture.





PE:





GEN: NAD


HEENT: Atraumatic, PERRL


LUNGS: tachypneic, nasal cannula, decreased anteriorly


HEART: S1S2


ABD: NABS, S/ND, RLQ tenderness


EXTREMITY: No edema


SKIN: No rashes, no jaundice


NEURO/PSYCH: A & O 3





A/P:


A/P:


N/v


-multiple ER visits this month for various reasons


-seems recurrent, difficult history





GERD


-occasionally treated w/ OTC meds


-remote h/o PUD, also hiatal hernia (noted on chest CT as above) w/ previous EGD





RLQ pain





Constipation


-pt says occasional, staff reports passing hard stool





CRC screen


-recalls previous colonoscopy





Chronic resp failure





Anemia


-Hgb seems at baseline, on B12





--


Will start PPI and Miralax.


Await CT.


Will review w/ Dr. Rome.








RAMON ROMO Mar 7, 2017 11:49

## 2017-03-07 NOTE — PDOC
Provider Note


Provider Note


Pt seen.H&P dictated.


#[id number]








SUMMER TABOR MD Mar 7, 2017 10:07

## 2017-03-08 VITALS — DIASTOLIC BLOOD PRESSURE: 48 MMHG | SYSTOLIC BLOOD PRESSURE: 108 MMHG

## 2017-03-08 VITALS — SYSTOLIC BLOOD PRESSURE: 105 MMHG | DIASTOLIC BLOOD PRESSURE: 63 MMHG

## 2017-03-08 VITALS — DIASTOLIC BLOOD PRESSURE: 55 MMHG | SYSTOLIC BLOOD PRESSURE: 106 MMHG

## 2017-03-08 VITALS — DIASTOLIC BLOOD PRESSURE: 54 MMHG | SYSTOLIC BLOOD PRESSURE: 104 MMHG

## 2017-03-08 VITALS — SYSTOLIC BLOOD PRESSURE: 112 MMHG | DIASTOLIC BLOOD PRESSURE: 58 MMHG

## 2017-03-08 VITALS — DIASTOLIC BLOOD PRESSURE: 51 MMHG | SYSTOLIC BLOOD PRESSURE: 105 MMHG

## 2017-03-08 RX ADMIN — ONDANSETRON SCH MG: 4 TABLET, ORALLY DISINTEGRATING ORAL at 21:16

## 2017-03-08 RX ADMIN — POLYETHYLENE GLYCOL 3350 SCH GM: 17 POWDER, FOR SOLUTION ORAL at 08:01

## 2017-03-08 RX ADMIN — IPRATROPIUM BROMIDE AND ALBUTEROL SULFATE SCH ML: .5; 3 SOLUTION RESPIRATORY (INHALATION) at 13:00

## 2017-03-08 RX ADMIN — VITAMIN D, TAB 1000IU (100/BT) SCH UNIT: 25 TAB at 08:00

## 2017-03-08 RX ADMIN — ONDANSETRON SCH MG: 4 TABLET, ORALLY DISINTEGRATING ORAL at 15:00

## 2017-03-08 RX ADMIN — DOCUSATE SODIUM SCH MG: 100 CAPSULE, LIQUID FILLED ORAL at 07:59

## 2017-03-08 RX ADMIN — IPRATROPIUM BROMIDE AND ALBUTEROL SULFATE SCH ML: .5; 3 SOLUTION RESPIRATORY (INHALATION) at 20:04

## 2017-03-08 RX ADMIN — LEVOFLOXACIN SCH MG: 250 TABLET, FILM COATED ORAL at 05:33

## 2017-03-08 RX ADMIN — PANTOPRAZOLE SODIUM SCH MG: 40 TABLET, DELAYED RELEASE ORAL at 07:59

## 2017-03-08 RX ADMIN — TRAZODONE HYDROCHLORIDE SCH MG: 100 TABLET ORAL at 21:15

## 2017-03-08 RX ADMIN — ALPRAZOLAM PRN MG: 0.25 TABLET ORAL at 21:16

## 2017-03-08 RX ADMIN — ENOXAPARIN SODIUM SCH MG: 30 INJECTION SUBCUTANEOUS at 21:00

## 2017-03-08 RX ADMIN — DIGOXIN SCH MCG: 0.12 TABLET ORAL at 08:00

## 2017-03-08 RX ADMIN — SENNOSIDES A AND B SCH MG: 8.6 TABLET, FILM COATED ORAL at 08:00

## 2017-03-08 RX ADMIN — OXYCODONE HYDROCHLORIDE AND ACETAMINOPHEN PRN TAB: 10; 325 TABLET ORAL at 21:16

## 2017-03-08 RX ADMIN — Medication SCH MCG: at 08:00

## 2017-03-08 RX ADMIN — PREDNISONE SCH MG: 10 TABLET ORAL at 08:01

## 2017-03-08 RX ADMIN — LEVOTHYROXINE SODIUM SCH MCG: 88 TABLET ORAL at 05:33

## 2017-03-08 RX ADMIN — ONDANSETRON SCH MG: 4 TABLET, ORALLY DISINTEGRATING ORAL at 05:33

## 2017-03-08 RX ADMIN — IPRATROPIUM BROMIDE AND ALBUTEROL SULFATE SCH ML: .5; 3 SOLUTION RESPIRATORY (INHALATION) at 09:08

## 2017-03-08 RX ADMIN — ESCITALOPRAM OXALATE SCH MG: 10 TABLET ORAL at 08:00

## 2017-03-08 RX ADMIN — BACITRACIN SCH MLS/HR: 5000 INJECTION, POWDER, FOR SOLUTION INTRAMUSCULAR at 11:00

## 2017-03-08 RX ADMIN — IPRATROPIUM BROMIDE AND ALBUTEROL SULFATE SCH ML: .5; 3 SOLUTION RESPIRATORY (INHALATION) at 16:42

## 2017-03-08 NOTE — PDOC
Subjective:


Subjective:


Ongoing nausea, a little emesis this morning.  Abd discomfort.





Objective:


Vital Signs:





 Vital Signs








  Date Time  Temp Pulse Resp B/P Pulse Ox O2 Delivery O2 Flow Rate FiO2


 


3/8/17 09:09      Nasal Cannula 5.0 


 


3/8/17 08:00  84  104/54    


 


3/8/17 06:55 97.9  17  89   





 97.9       








Labs:





 Laboratory Tests








Test


  3/8/17


03:40


 


Digoxin Level 0.8ng/mL 


 


Digoxin Last Dose Date [id number] 


 


Digoxin Last Dose Time 0800 











PE:





GEN: NAD


LUNGS: breathing treatment


HEART: S1S2


ABD:vague tenderness throughout


NEURO/PSYCH: A & O 3





A/P:


N/v, GERD


-now on PPI, also Zofran


-remote h/o PUD, also hiatal hernia (noted on chest CT as above) w/ previous EGD





Abd pain





Constipation


-BM 3/7, now on Miralax, recalls previous colonoscopy





--


Continue same per GI.








RAMON ROMO Mar 8, 2017 09:32

## 2017-03-08 NOTE — PDOC
PROGRESS NOTES


Subjective


Subjective


less nausea





Objective


Objective





 Vital Signs








  Date Time  Temp Pulse Resp B/P Pulse Ox O2 Delivery O2 Flow Rate FiO2


 


3/8/17 09:09      Nasal Cannula 5.0 


 


3/8/17 08:00  84  104/54    


 


3/8/17 06:55 97.9  17  89   





 97.9       














 Intake and Output 


 


 3/8/17





 07:00


 


Intake Total 1300 ml


 


Output Total 700 ml


 


Balance 600 ml


 


 


 


Intake Oral 1300 ml


 


Output Urine Total 700 ml


 


# Voids 2


 


# Bowel Movements 1











Physical Exam


Abdomen:  Normal bowel sounds, Soft


Heart:  Regular rate, Normal S1, Normal S2


Extremities:  No clubbing


General:  Alert


HEENT:  Atraumatic


Lungs:  Clear to auscultation


MUSCULOSKELETAL:  No swelling


Neuro:  Normal speech


Psych/Mental Status:  Mental status NL


Skin:  No breakdown





Diagnosis


Problem List


 Problems


Medical Problems:


(1) COPD (chronic obstructive pulmonary disease)


Status: Acute  





(2) Metabolic alkalosis


Status: Acute  





(3) Nausea and vomiting


Status: Acute  











Assessment


Assessment


 Problems


Medical Problems:


(1) COPD (chronic obstructive pulmonary disease)


Status: Acute  





(2) Metabolic alkalosis


Status: Acute  





(3) Nausea and vomiting


Status: Acute  





FINAL IMPRESSION:


1.  Persistent nausea and vomiting.


2.  History of large hiatal hernia.


3.  Mild silent aspiration.


4.  Chronic obstructive pulmonary disease, on home oxygen.


5.  History of lung cancer, had radiation treatment 5 years ago.


6.  Osteoporosis with compression of the spine, had vertebroplasties in the


past.


7.  Multiple abdominal surgeries as mentioned above.





PLAN: 


Ct abd and pelvis could not be done due to recent barium swallow study done 

last week .


appreciate GI consult.


iv fluids.


ct in 1-2 days.


labs improving .


advance diet.





At this time, admit to hospital and hydrate with IV fluids, Zofran for


nausea, Gastroenterology consult.  Clear liquid diet and CT scan of the abdomen


and pelvis, IV hydration and see how the patient's condition improves.


Problems:  





Plan


Plan of Care


 Problems


Medical Problems:


(1) COPD (chronic obstructive pulmonary disease)


Status: Acute  





(2) Metabolic alkalosis


Status: Acute  





(3) Nausea and vomiting


Status: Acute  








Comment


Review of Relevant


I have reviewed the following items shan (where applicable) has been applied.


Labs





Laboratory Tests








Test


  3/8/17


03:40


 


Digoxin Level


  0.8ng/mL


(0.9-2.0)


 


Digoxin Last Dose Date [id number] 


 


Digoxin Last Dose Time 0800 








Medications





Current Medications


Info (Do NOT chart on this entry -- for MONITORING) 1 each PRN DAILY  PRN MC 

SEE COMMENTS;  Start 3/7/17 at 10:30;  Stop 3/9/17 at 10:29


Pantoprazole Sodium (Protonix) 40 mg DAILYAC PO  Last administered on 3/8/17at 

07:59;  Start 3/7/17 at 12:00


Polyethylene Glycol (miraLAX PACKET) 17 gm DAILY PO  Last administered on 3/8/

17at 08:01;  Start 3/7/17 at 12:00


Vitals/I & O





 Vital Sign - Last 24 Hours








 3/7/17 3/7/17 3/7/17 3/7/17





 10:50 11:52 15:01 16:54


 


Temp 98.1  97.5 





 98.1  97.5 


 


Pulse 87  89 


 


Resp 17  18 


 


B/P 119/61  121/50 


 


Pulse Ox 92  92 


 


O2 Delivery Nasal Cannula Nasal Cannula Nasal Cannula Nasal Cannula


 


O2 Flow Rate 5.0 5.0 5.0 5.0


 


    





    





 3/7/17 3/7/17 3/7/17 3/7/17





 19:00 20:21 20:44 20:51


 


Temp 99.1   





 99.1   


 


Pulse 93   


 


Resp 18 18


 


B/P 115/56   


 


Pulse Ox 94  96 96


 


O2 Delivery Nasal Cannula Nasal Cannula Nasal Cannula Nasal Cannula


 


O2 Flow Rate 5.0 5.0 5.0 5.0


 


    





    





 3/7/17 3/7/17 3/8/17 3/8/17





 21:51 23:00 03:00 06:55


 


Temp  98.9 97.7 97.9





  98.9 97.7 97.9


 


Pulse  78 89 84


 


Resp 18 18 18 17


 


B/P  94/42 106/55 104/54


 


Pulse Ox 96 94 91 89


 


O2 Delivery Nasal Cannula Nasal Cannula Nasal Cannula Nasal Cannula


 


O2 Flow Rate 5.0 5.0 5.0 5.0


 


    





    





 3/8/17 3/8/17  





 08:00 09:09  


 


Pulse 84   


 


B/P 104/54   


 


O2 Delivery  Nasal Cannula  


 


O2 Flow Rate  5.0  














 Intake and Output   


 


 3/7/17 3/7/17 3/8/17





 15:00 23:00 07:00


 


Intake Total 200 ml 400 ml 700 ml


 


Output Total 700 ml  


 


Balance -500 ml 400 ml 700 ml














SUMMER TABOR MD Mar 8, 2017 10:15

## 2017-03-08 NOTE — RAD
The patient was scheduled for a CT scan of the abdomen. A  film and

single axial section through the abdomen was obtained. The patient has a large

amount of residual barium throughout the colon related to a recent video

swallow study. This residual barium contrast causes significant beam hardening

artifact which would significantly compromise the image quality. The

examination was canceled and rescheduled for the following day.

## 2017-03-09 VITALS — SYSTOLIC BLOOD PRESSURE: 123 MMHG | DIASTOLIC BLOOD PRESSURE: 65 MMHG

## 2017-03-09 VITALS — SYSTOLIC BLOOD PRESSURE: 117 MMHG | DIASTOLIC BLOOD PRESSURE: 61 MMHG

## 2017-03-09 VITALS — DIASTOLIC BLOOD PRESSURE: 55 MMHG | SYSTOLIC BLOOD PRESSURE: 111 MMHG

## 2017-03-09 VITALS — DIASTOLIC BLOOD PRESSURE: 50 MMHG | SYSTOLIC BLOOD PRESSURE: 104 MMHG

## 2017-03-09 VITALS — SYSTOLIC BLOOD PRESSURE: 91 MMHG | DIASTOLIC BLOOD PRESSURE: 45 MMHG

## 2017-03-09 VITALS — DIASTOLIC BLOOD PRESSURE: 58 MMHG | SYSTOLIC BLOOD PRESSURE: 107 MMHG

## 2017-03-09 RX ADMIN — DOCUSATE SODIUM SCH MG: 100 CAPSULE, LIQUID FILLED ORAL at 09:19

## 2017-03-09 RX ADMIN — ONDANSETRON SCH MG: 4 TABLET, ORALLY DISINTEGRATING ORAL at 05:39

## 2017-03-09 RX ADMIN — LEVOTHYROXINE SODIUM SCH MCG: 88 TABLET ORAL at 05:39

## 2017-03-09 RX ADMIN — IPRATROPIUM BROMIDE AND ALBUTEROL SULFATE SCH ML: .5; 3 SOLUTION RESPIRATORY (INHALATION) at 20:03

## 2017-03-09 RX ADMIN — TRAZODONE HYDROCHLORIDE SCH MG: 100 TABLET ORAL at 21:38

## 2017-03-09 RX ADMIN — Medication SCH MCG: at 09:19

## 2017-03-09 RX ADMIN — PANTOPRAZOLE SODIUM SCH MG: 40 TABLET, DELAYED RELEASE ORAL at 09:20

## 2017-03-09 RX ADMIN — ALPRAZOLAM PRN MG: 0.25 TABLET ORAL at 19:18

## 2017-03-09 RX ADMIN — POLYETHYLENE GLYCOL 3350 SCH GM: 17 POWDER, FOR SOLUTION ORAL at 09:00

## 2017-03-09 RX ADMIN — DIGOXIN SCH MCG: 0.12 TABLET ORAL at 09:19

## 2017-03-09 RX ADMIN — ENOXAPARIN SODIUM SCH MG: 30 INJECTION SUBCUTANEOUS at 21:38

## 2017-03-09 RX ADMIN — IPRATROPIUM BROMIDE AND ALBUTEROL SULFATE SCH ML: .5; 3 SOLUTION RESPIRATORY (INHALATION) at 17:02

## 2017-03-09 RX ADMIN — PREDNISONE SCH MG: 10 TABLET ORAL at 09:19

## 2017-03-09 RX ADMIN — IPRATROPIUM BROMIDE AND ALBUTEROL SULFATE SCH ML: .5; 3 SOLUTION RESPIRATORY (INHALATION) at 08:30

## 2017-03-09 RX ADMIN — VITAMIN D, TAB 1000IU (100/BT) SCH UNIT: 25 TAB at 09:19

## 2017-03-09 RX ADMIN — ONDANSETRON SCH MG: 4 TABLET, ORALLY DISINTEGRATING ORAL at 21:38

## 2017-03-09 RX ADMIN — BACITRACIN SCH MLS/HR: 5000 INJECTION, POWDER, FOR SOLUTION INTRAMUSCULAR at 13:40

## 2017-03-09 RX ADMIN — SENNOSIDES A AND B SCH MG: 8.6 TABLET, FILM COATED ORAL at 09:19

## 2017-03-09 RX ADMIN — ONDANSETRON SCH MG: 4 TABLET, ORALLY DISINTEGRATING ORAL at 14:00

## 2017-03-09 RX ADMIN — BACITRACIN SCH MLS/HR: 5000 INJECTION, POWDER, FOR SOLUTION INTRAMUSCULAR at 00:20

## 2017-03-09 RX ADMIN — IPRATROPIUM BROMIDE AND ALBUTEROL SULFATE SCH ML: .5; 3 SOLUTION RESPIRATORY (INHALATION) at 12:16

## 2017-03-09 RX ADMIN — ESCITALOPRAM OXALATE SCH MG: 10 TABLET ORAL at 09:19

## 2017-03-09 NOTE — RAD
EXAM: CT abdomen without contrast.



HISTORY: Abdominal pain, nausea and vomiting.



TECHNIQUE: CT of the abdomen was performed without intravenous contrast.



COMPARISON: None.



FINDINGS: Images of the lung bases are well enlargement of the main pulmonary

artery at 3.6 cm. The heart is moderately enlarged. There are atherosclerotic

calcifications of the coronary arteries. There is a small pericardial

effusion. There are small bilateral pleural effusions with associated

atelectasis. A component of centrilobular emphysema is suspected. Calcified

mediastinal lymph nodes are likely secondary to old granulomatous disease.

There is a large hiatal hernia. Bone windows reveal diffuse vertebroplasty

changes throughout the lower thoracic spine and at L1. There are additional

mild to moderate compression deformities at other levels. None are clearly

acute. Changes of bone graft harvesting are noted along the right posterior

iliac bone.



There are calcified granulomas in the liver and spleen. The gallbladder is

surgically absent. The common duct is mildly dilated at 9 mm. There is no

clear distal obstructing lesion. There are multiple cysts in the right kidney

that measure up to 3.9 cm at the upper pole. Multiple right renal calculi

measure up to 4 mm. There is no hydronephrosis.



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

aorta. There is no aneurysm.



There is diastases of the rectus muscles without a discrete hernia. There is

no evidence of obstruction. The pelvis is not included.



IMPRESSION:

1. No cause for acute abdominal pain is identified. The pelvis is not

included.

2. Mild extrahepatic biliary dilatation status post cholecystectomy. Correlate

for cholestasis to assess significance.

3. Right renal calculi measure up to 4 mm. No hydronephrosis.

4. Moderate cardiomegaly. Changes of pulmonary arterial hypertension.

5. Large hiatal hernia.

6. Small pericardial and bilateral pleural effusions.



One or more of the following individualized dose reduction techniques were

utilized for this examination:

1. Automated exposure control.

2. Adjustment of the mA and/or kV according to patient size.

3. Use of iterative reconstruction technique.

## 2017-03-09 NOTE — PDOC
PROGRESS NOTES


Subjective


Subjective


able to keep breakfast down





Objective


Objective





 Vital Signs








  Date Time  Temp Pulse Resp B/P Pulse Ox O2 Delivery O2 Flow Rate FiO2


 


3/9/17 12:19      Nasal Cannula 6.0 


 


3/9/17 11:12 96.8 84 18 91/45 93   





 96.8       














 Intake and Output 


 


 3/9/17





 07:00


 


Intake Total 1440 ml


 


Output Total 925 ml


 


Balance 515 ml


 


 


 


Intake Oral 1440 ml


 


Output Urine Total 725 ml


 


Emesis 200 ml


 


# Voids 7











Physical Exam


Abdomen:  Normal bowel sounds, Soft


Heart:  Regular rate, Normal S1, Normal S2


Extremities:  No clubbing


General:  Alert


HEENT:  Atraumatic


Lungs:  Clear to auscultation


MUSCULOSKELETAL:  No swelling


Neuro:  Normal speech


Psych/Mental Status:  Mental status NL


Skin:  No breakdown





Diagnosis


Problem List


 Problems


Medical Problems:


(1) COPD (chronic obstructive pulmonary disease)


Status: Acute  





(2) Metabolic alkalosis


Status: Acute  





(3) Nausea and vomiting


Status: Acute  











Assessment


Assessment


 Problems


Medical Problems:


(1) COPD (chronic obstructive pulmonary disease)


Status: Acute  





(2) Metabolic alkalosis


Status: Acute  





(3) Nausea and vomiting


Status: Acute  





FINAL IMPRESSION:


1.  Persistent nausea and vomiting.


2.  History of large hiatal hernia.


3.  Mild silent aspiration.


4.  Chronic obstructive pulmonary disease, on home oxygen.


5.  History of lung cancer, had radiation treatment 5 years ago.


6.  Osteoporosis with compression of the spine, had vertebroplasties in the


past.


7.  Multiple abdominal surgeries as mentioned above.





PLAN: Ct scan today


Ct abd and pelvis could not be done due to recent barium swallow study done 

last week .


appreciate GI consult.


iv fluids.


labs improving .


advance diet.





At this time, admit to hospital and hydrate with IV fluids, Zofran for


nausea, Gastroenterology consult.  Clear liquid diet and CT scan of the abdomen


and pelvis, IV hydration and see how the patient's condition improves.


Problems:  





Plan


Plan of Care


 Problems


Medical Problems:


(1) COPD (chronic obstructive pulmonary disease)


Status: Acute  





(2) Metabolic alkalosis


Status: Acute  





(3) Nausea and vomiting


Status: Acute  








Comment


Review of Relevant


I have reviewed the following items shan (where applicable) has been applied.


Medications





Current Medications


Albuterol/ Ipratropium (Duoneb) 3 ml 1X  ONCE NEB  Last administered on 3/8/

17at 22:59;  Start 3/8/17 at 22:55;  Stop 3/8/17 at 22:56;  Status DC


Info (Do NOT chart on this entry -- for MONITORING) 1 each PRN DAILY  PRN MC 

SEE COMMENTS;  Start 3/9/17 at 11:15;  Stop 3/11/17 at 11:14


Info (Do NOT chart on this entry -- for MONITORING) 1 each PRN DAILY  PRN MC 

SEE COMMENTS;  Start 3/9/17 at 11:30;  Stop 3/11/17 at 11:29


Iohexol (Omnipaque 240 Mg/ml) 30 ml 1X  ONCE IV ;  Start 3/9/17 at 11:00;  Stop 

3/9/17 at 11:06;  Status DC


Iohexol (Omnipaque 240 Mg/ml) 30 ml 1X  ONCE PO ;  Start 3/9/17 at 11:30;  Stop 

3/9/17 at 11:31;  Status DC


Meclizine HCl (Antivert) 12.5 mg TID PRN  PRN PO DIZZINESS Last administered on 

3/8/17at 15:00;  Start 3/8/17 at 15:00


Vitals/I & O





 Vital Sign - Last 24 Hours








 3/8/17 3/8/17 3/8/17 3/8/17





 14:58 16:43 19:08 19:56


 


Temp 97.7  97.9 





 97.7  97.9 


 


Pulse 93  89 


 


Resp 18  18 


 


B/P 105/63  112/58 


 


Pulse Ox 93  92 


 


O2 Delivery Nasal Cannula Nasal Cannula Nasal Cannula Nasal Cannula


 


O2 Flow Rate 6.0 5.0 6.0 6.0


 


    





    





 3/8/17 3/8/17 3/8/17 3/8/17





 20:06 21:16 22:16 22:59


 


Pulse Ox 92 92  97


 


O2 Delivery Nasal Cannula Nasal Cannula Nasal Cannula NonRebreather Mask


 


O2 Flow Rate 5.0 6.0 6.0 12.0





 3/8/17 3/9/17 3/9/17 3/9/17





 23:00 01:09 02:44 03:07


 


Temp 98.1  97.5 





 98.1  97.5 


 


Pulse 95  79 


 


Resp 18  18 


 


B/P 105/51  104/50 


 


Pulse Ox 95 96 92 90


 


O2 Delivery NonRebreather Mask Venturi Mask Venturi Mask Venturi Mask


 


O2 Flow Rate 15.0 15.0 15.0 15.0


 


    





    





 3/9/17 3/9/17 3/9/17 3/9/17





 07:53 08:00 08:32 09:19


 


Temp 97.9   





 97.9   


 


Pulse 77   77


 


Resp 18   


 


B/P 123/65   123/65


 


Pulse Ox 92  94 


 


O2 Delivery Venturi Mask Nasal Cannula Nasal Cannula 


 


O2 Flow Rate 15.0 6.0 6.0 


 


    





    





 3/9/17 3/9/17  





 11:12 12:19  


 


Temp 96.8   





 96.8   


 


Pulse 84   


 


Resp 18   


 


B/P 91/45   


 


Pulse Ox 93   


 


O2 Delivery Venturi Mask Nasal Cannula  


 


O2 Flow Rate 15.0 6.0  














 Intake and Output   


 


 3/8/17 3/8/17 3/9/17





 15:00 23:00 07:00


 


Intake Total 240 ml 900 ml 300 ml


 


Output Total   925 ml


 


Balance 240 ml 900 ml -625 ml














SUMMER TABOR MD Mar 9, 2017 14:23

## 2017-03-09 NOTE — PDOC
Subjective:


Subjective:


A little better today.  Drinking contrast.


Less nausea.  No BM.  Abd pain about the same.





Objective:


Objective:


Emesis basin w/ small amount clear saliva in one corner.


Vital Signs:





 Vital Signs








  Date Time  Temp Pulse Resp B/P Pulse Ox O2 Delivery O2 Flow Rate FiO2


 


3/9/17 12:19      Nasal Cannula 6.0 


 


3/9/17 11:12 96.8 84 18 91/45 93   





 96.8       











PE:





GEN: NAD


LUNGS: nasal cannula


HEART: RRR


ABD: RUQ tenderness


NEURO/PSYCH: A & O 3





A/P:


N/v, GERD - improving


-on PPI, Zofran


-remote h/o PUD, also hiatal hernia (noted on chest CT as above) w/ previous EGD





Abd pain


-seems having CT today





Constipation


-BM 3/7, now on Miralax QD - note refused today


-recalls previous colonoscopy





--


Continue PPI.  ?something different for constipation


Await CT.








RAMON ROMO Mar 9, 2017 12:25

## 2017-03-10 VITALS
SYSTOLIC BLOOD PRESSURE: 100 MMHG | DIASTOLIC BLOOD PRESSURE: 48 MMHG | DIASTOLIC BLOOD PRESSURE: 48 MMHG | SYSTOLIC BLOOD PRESSURE: 100 MMHG

## 2017-03-10 VITALS — DIASTOLIC BLOOD PRESSURE: 55 MMHG | SYSTOLIC BLOOD PRESSURE: 100 MMHG

## 2017-03-10 VITALS — DIASTOLIC BLOOD PRESSURE: 58 MMHG | SYSTOLIC BLOOD PRESSURE: 130 MMHG

## 2017-03-10 RX ADMIN — VITAMIN D, TAB 1000IU (100/BT) SCH UNIT: 25 TAB at 08:30

## 2017-03-10 RX ADMIN — OXYCODONE HYDROCHLORIDE AND ACETAMINOPHEN PRN TAB: 10; 325 TABLET ORAL at 08:30

## 2017-03-10 RX ADMIN — IPRATROPIUM BROMIDE AND ALBUTEROL SULFATE SCH ML: .5; 3 SOLUTION RESPIRATORY (INHALATION) at 07:00

## 2017-03-10 RX ADMIN — DOCUSATE SODIUM SCH MG: 100 CAPSULE, LIQUID FILLED ORAL at 08:30

## 2017-03-10 RX ADMIN — Medication SCH MCG: at 08:30

## 2017-03-10 RX ADMIN — BACITRACIN SCH MLS/HR: 5000 INJECTION, POWDER, FOR SOLUTION INTRAMUSCULAR at 01:29

## 2017-03-10 RX ADMIN — IPRATROPIUM BROMIDE AND ALBUTEROL SULFATE SCH ML: .5; 3 SOLUTION RESPIRATORY (INHALATION) at 11:27

## 2017-03-10 RX ADMIN — ONDANSETRON SCH MG: 4 TABLET, ORALLY DISINTEGRATING ORAL at 05:52

## 2017-03-10 RX ADMIN — POLYETHYLENE GLYCOL 3350 SCH GM: 17 POWDER, FOR SOLUTION ORAL at 08:29

## 2017-03-10 RX ADMIN — PREDNISONE SCH MG: 10 TABLET ORAL at 08:29

## 2017-03-10 RX ADMIN — ESCITALOPRAM OXALATE SCH MG: 10 TABLET ORAL at 08:30

## 2017-03-10 RX ADMIN — ONDANSETRON SCH MG: 4 TABLET, ORALLY DISINTEGRATING ORAL at 14:00

## 2017-03-10 RX ADMIN — PANTOPRAZOLE SODIUM SCH MG: 40 TABLET, DELAYED RELEASE ORAL at 08:29

## 2017-03-10 RX ADMIN — DIGOXIN SCH MCG: 0.12 TABLET ORAL at 08:30

## 2017-03-10 RX ADMIN — SENNOSIDES A AND B SCH MG: 8.6 TABLET, FILM COATED ORAL at 08:29

## 2017-03-10 RX ADMIN — LEVOTHYROXINE SODIUM SCH MCG: 88 TABLET ORAL at 05:52

## 2017-03-10 NOTE — PDOC
Subjective:


Subjective:


Doing better.  Eating a little. Less abd pain.  Less nausea.  Wants to leave.





Objective:


Objective:


Per RN - eating a little better, sometimes coughs/spits phlegm but no vomiting.


Vital Signs:





 Vital Signs








  Date Time  Temp Pulse Resp B/P Pulse Ox O2 Delivery O2 Flow Rate FiO2


 


3/10/17 11:28      Nasal Cannula 5.0 


 


3/10/17 10:46 97.8 86 16 100/48 90   





 97.8       








Imaging:


CT abd


IMPRESSION:


1. No cause for acute abdominal pain is identified. The pelvis is not included.


2. Mild extrahepatic biliary dilatation status post cholecystectomy. Correlate 

for cholestasis to assess significance.


3. Right renal calculi measure up to 4 mm. No hydronephrosis.


4. Moderate cardiomegaly. Changes of pulmonary arterial hypertension.


5. Large hiatal hernia.


6. Small pericardial and bilateral pleural effusions.





PE:





GEN: NAD


LUNGS: nasal cannula


HEART: S1S2


ABD:less tender


NEURO/PSYCH: A & O 3





A/P:


N/v, GERD - improved


-on PPI





Hiatal hernia





Abd pain - better





--


Note plans to DC today.  Would continue PPI.








RAMON ROMO Mar 10, 2017 12:59

## 2017-03-10 NOTE — PDOC
PROGRESS NOTES


Subjective


Subjective


feels better ,no n/v, keeping food down





Objective


Objective





 Vital Signs








  Date Time  Temp Pulse Resp B/P Pulse Ox O2 Delivery O2 Flow Rate FiO2


 


3/10/17 10:00      Nasal Cannula 5.0 


 


3/10/17 08:30  74  130/58    


 


3/10/17 07:02     98   


 


3/10/17 07:00 97.5  18     





 97.5       














 Intake and Output 


 


 3/10/17





 07:00


 


Intake Total 450 ml


 


Output Total 1300 ml


 


Balance -850 ml


 


 


 


Intake Oral 450 ml


 


Output Urine Total 1300 ml











Physical Exam


Abdomen:  Normal bowel sounds, Soft


Heart:  Regular rate, Normal S1, Normal S2


Extremities:  No clubbing


General:  Alert


HEENT:  Atraumatic


Lungs:  Clear to auscultation


MUSCULOSKELETAL:  No swelling


Neuro:  Normal speech


Psych/Mental Status:  Mental status NL


Skin:  No breakdown





Diagnosis


Problem List


 Problems


Medical Problems:


(1) COPD (chronic obstructive pulmonary disease)


Status: Acute  





(2) Metabolic alkalosis


Status: Acute  





(3) Nausea and vomiting


Status: Acute  











Assessment


Assessment


 Problems


Medical Problems:


(1) COPD (chronic obstructive pulmonary disease)


Status: Acute  





(2) Metabolic alkalosis


Status: Acute  





(3) Nausea and vomiting


Status: Acute  





FINAL IMPRESSION:


1.  Persistent nausea and vomiting.


2.  History of large hiatal hernia.


3.  Mild silent aspiration.


4.  Chronic obstructive pulmonary disease, on home oxygen.


5.  History of lung cancer, had radiation treatment 5 years ago.


6.  Osteoporosis with compression of the spine, had vertebroplasty .


7.  Multiple abdominal surgeries as mentioned above.





PLAN: Ct scan neg for blockage, large hiatal hernia


d/c home today.tolerating diet well.


home  with home health


appreciate GI consult.


labs improving .


advance diet.


Problems:  





Plan


Plan of Care


 Problems


Medical Problems:


(1) COPD (chronic obstructive pulmonary disease)


Status: Acute  





(2) Metabolic alkalosis


Status: Acute  





(3) Nausea and vomiting


Status: Acute  








Comment


Review of Relevant


I have reviewed the following items shan (where applicable) has been applied.


Medications





Current Medications


Info (Do NOT chart on this entry -- for MONITORING) 1 each PRN DAILY  PRN MC 

SEE COMMENTS;  Start 3/9/17 at 11:15;  Stop 3/11/17 at 11:14


Info (Do NOT chart on this entry -- for MONITORING) 1 each PRN DAILY  PRN MC 

SEE COMMENTS;  Start 3/9/17 at 11:30;  Stop 3/11/17 at 11:29


Iohexol (Omnipaque 240 Mg/ml) 30 ml 1X  ONCE IV ;  Start 3/9/17 at 11:00;  Stop 

3/9/17 at 11:06;  Status DC


Iohexol (Omnipaque 240 Mg/ml) 30 ml 1X  ONCE PO ;  Start 3/9/17 at 11:30;  Stop 

3/9/17 at 11:31;  Status DC


Vitals/I & O





 Vital Sign - Last 24 Hours








 3/9/17 3/9/17 3/9/17 3/9/17





 11:12 12:19 15:23 17:04


 


Temp 96.8  98.0 





 96.8  98.0 


 


Pulse 84  88 


 


Resp 18  18 


 


B/P 91/45  107/58 


 


Pulse Ox 93  93 


 


O2 Delivery Venturi Mask Nasal Cannula Nasal Cannula Nasal Cannula


 


O2 Flow Rate 15.0 6.0  6.0


 


    





    





 3/9/17 3/9/17 3/9/17 3/9/17





 19:00 20:00 20:06 23:00


 


Temp 97.9   97.9





 97.9   97.9


 


Pulse 86   82


 


Resp 20   20


 


B/P 111/55   117/61


 


Pulse Ox 93  95 97


 


O2 Delivery Nasal Cannula Nasal Cannula Nasal Cannula Nasal Cannula


 


O2 Flow Rate 6.0 5.0 5.0 6.0


 


    





    





 3/10/17 3/10/17 3/10/17 3/10/17





 02:52 07:00 07:02 08:00


 


Temp 97.5 97.5  





 97.5 97.5  


 


Pulse 78 74  


 


Resp 20 18  


 


B/P 100/55 130/58  


 


Pulse Ox 94 94 98 


 


O2 Delivery Nasal Cannula Venturi Mask Nasal Cannula Nasal Cannula


 


O2 Flow Rate 6.0 6.0 6.0 5.0


 


    





    





 3/10/17 3/10/17 3/10/17 





 08:30 08:30 10:00 


 


Pulse 74   


 


B/P 130/58   


 


O2 Delivery  Nasal Cannula Nasal Cannula 


 


O2 Flow Rate  5.0 5.0 














 Intake and Output   


 


 3/9/17 3/9/17 3/10/17





 15:00 23:00 07:00


 


Intake Total  450 ml 0 ml


 


Output Total  200 ml 1100 ml


 


Balance  250 ml -1100 ml














SUMMER TABOR MD Mar 10, 2017 10:29

## 2017-03-11 NOTE — DS
DATE OF DISCHARGE:  03/10/2017



REASON FOR ADMISSION TO THE HOSPITAL:  Nausea, vomiting, metabolic alkalosis.



CONSULTATION:  Dr. Rome.



PROCEDURE DONE:  CT of the abdomen.



COMPLICATIONS NOTED:  None.



HOSPITAL COURSE:  The patient is an 84-year-old female with history of COPD, has

a large hiatal hernia, came in with nausea and vomiting, metabolic alkalosis,

was given IV fluids and the patient had a barium for swallowing study last week.

 CT scan could not be done immediately, was done a couple of days later, shows a

large hiatal hernia, no obstruction.  The patient's condition improved with

time, fluids, conservative treatment and advancing the diet and she was feeling

better and she was discharged.  At home, she was on COPD with home oxygen.  Her

concentrator was not working, which was replaced by home health agency.



FINAL DIAGNOSES:

1.  Nausea and vomiting secondary to large hiatal hernia.

2.  Large hiatal hernia.

3.  Metabolic alkalosis secondary to nausea and vomiting.

4.  Chronic COPD, on home oxygen.

5.  History of lung cancer, had radiation treatment 5 years ago.



DISPOSITION:  Home.



DISCHARGE MEDICATIONS:  See MRAD for discharge medications.

 



______________________________

SUMMER TABOR MD



DR:  RAGINI/aidan  JOB#:  [id number] / [id number]

DD:  03/11/2017 14:32  DT:  03/11/2017 18:48

## 2017-03-11 NOTE — PDOC
Provider Note


Provider Note


Discharge summary dictated.


#[id number]








SUMMER TABOR MD Mar 11, 2017 14:34

## 2017-04-24 ENCOUNTER — HOSPITAL ENCOUNTER (OUTPATIENT)
Dept: HOSPITAL 61 - PNCL | Age: 82
Discharge: HOME | End: 2017-04-24
Attending: ANESTHESIOLOGY
Payer: MEDICARE

## 2017-04-24 DIAGNOSIS — J44.9: ICD-10-CM

## 2017-04-24 DIAGNOSIS — E03.9: ICD-10-CM

## 2017-04-24 DIAGNOSIS — F32.9: ICD-10-CM

## 2017-04-24 DIAGNOSIS — Z90.710: ICD-10-CM

## 2017-04-24 DIAGNOSIS — M19.90: ICD-10-CM

## 2017-04-24 DIAGNOSIS — K21.9: ICD-10-CM

## 2017-04-24 DIAGNOSIS — M96.1: ICD-10-CM

## 2017-04-24 DIAGNOSIS — Z90.49: ICD-10-CM

## 2017-04-24 DIAGNOSIS — F41.9: ICD-10-CM

## 2017-04-24 DIAGNOSIS — M51.16: Primary | ICD-10-CM

## 2017-04-24 PROCEDURE — 62323 NJX INTERLAMINAR LMBR/SAC: CPT

## 2017-04-25 NOTE — PN
DATE:  04/24/2017



PROGRESS NOTE FOR PAIN CLINIC



DIAGNOSES:  Lumbar radiculopathy with lumbar degenerative disk disease and

lumbar post-laminectomy syndrome.



HISTORY OF PRESENT ILLNESS:  This patient is an 84-year-old female who returns

for followup.  Last seen 07/29/2016 underwent 2 caudal approach epidural steroid

injections at that time with good results.  The patient reports about 80%

improvement, but the pain is returning now over the past month or two in her low

back and to her right lower extremity more than the left.  She has had 2-3 falls

in the last few months when she has been getting more unstable on her foot and

her leg, especially on the right side.  She ____ and is causing increased pain

in the low back and the right leg with significant radiation into the right hip

lower extremity, mostly in the posterior aspect of the gluteus and thigh as well

as into the posterior calf on the right side when walking.  The patient reports

she has been using a wheelchair when she came and she is somewhat debilitated

from her pulmonary condition, she is on oxygen 5 liters continuously and she has

not very active as it is, but this is limited to her ability to have comfort

with activity significantly.  The patient reports no new motor or sensory

deficits or other complaints.



PHYSICAL EXAMINATION:

VITAL SIGNS:  The patient's blood pressure is 119/67, pulse is 59, respirations

are 18, temperature is 97.7 degrees Fahrenheit.  Height is 4 feet, weight is 132

pounds.

GENERAL:  The patient is awake, alert, oriented, appropriate, very pleasant

demeanor.

HEENT:  Shows the patient wears eyeglasses, normocephalic, atraumatic. 

Extraocular movements are intact and symmetrical.  Oral cavity, mucous membranes

are moist and pink.  Dentition is intact.

NECK:  Shows anterior throat supple without palpable lymphadenopathy noted. 

Swallow reflex is symmetrical.

CHEST:  Shows normal on inspection.  Breath sounds are coarse, but clear.  No

rales, rhonchi or wheezes are auscultated and/or present in all 4 upper and

lower lung fields, both anterior and posteriorly.

HEART:  Shows S1 and S2 clear.  No murmurs auscultated.

ABDOMEN:  Soft, nontender, nondistended.  No palpable organomegaly .  There is

no rebound or guarding demonstrated.

BACK:  Shows spine grossly midline.  Slight exaggeration of thoracic kyphosis

and mild flattening of lumbar lordotic curvature.  Well healed surgical scars

noted in the lumbar distribution once again.  Lumbar paraspinous musculature

shows no asymmetry on inspection with palpation shows moderate tenderness with

palpation bilaterally in the lumbar paraspinous muscles without radiation, no

tenderness over the sacrum or sacroiliac regions.

EXTREMITIES:  Lower extremities showed deep tendon reflexes at 1+ in the

patellar and tendo calcaneus tendons are equal.  Motor exam is approximately 4

on a scale of 5 dorsiflexion, extension, but symmetrical also quadriceps and

hamstring flexion about 4/5 on the right and 4/5 on the left.



Options were discussed with the patient and the patient's old chart was reviewed

and the patient's medications were updated as well as review of systems updated

today.  The patient would like to proceed with a caudal approach epidural

steroid injection.  She did very well with these in the past.  We discussed the

procedure using description as well as anatomical models to describe the

procedure.  Risks were then discussed including, but not limited to bleeding,

infection, possibility of epidural hematoma, subsequent neurologic compromise,

dural puncture, headaches, spinal cord and/or nerve damage, side effects of

steroid medication and poor results regarding pain control.  The patient

understands and wishes to proceed.  The patient will return to clinic in

approximately 2 weeks for followup, was counseled on return appointment,

activity level and side effects to be aware of.



DIAGNOSIS:  Lumbar radiculopathy with lumbar degenerative disk disease and

post-lumbar laminectomy syndrome.



PROCEDURE:  Caudal approach epidural steroid injection using fluoroscopic

guidance under sterile prep and drape using local anesthetic.  Medications

injected is 120 mg Depo-Medrol plus 10 mL of preservative-free normal saline and

2 mL of Isovue for contrast.



CONDITION AT DISCHARGE:  Stable.  The patient tolerated the procedure well, had

no complications.

 



______________________________

JOSÉ MIGUEL ALONSO MD



DR:  RONALDO/aidan  JOB#:  [id number] / [id number]

DD:  04/24/2017 12:50  DT:  04/25/2017 05:31

## 2017-05-15 ENCOUNTER — HOSPITAL ENCOUNTER (INPATIENT)
Dept: HOSPITAL 61 - ER | Age: 82
LOS: 4 days | Discharge: HOME | DRG: 189 | End: 2017-05-19
Attending: INTERNAL MEDICINE | Admitting: INTERNAL MEDICINE
Payer: MEDICARE

## 2017-05-15 VITALS — DIASTOLIC BLOOD PRESSURE: 60 MMHG | SYSTOLIC BLOOD PRESSURE: 110 MMHG

## 2017-05-15 VITALS — WEIGHT: 125.5 LBS | HEIGHT: 56 IN | BODY MASS INDEX: 28.23 KG/M2

## 2017-05-15 VITALS — SYSTOLIC BLOOD PRESSURE: 104 MMHG | DIASTOLIC BLOOD PRESSURE: 62 MMHG

## 2017-05-15 VITALS — SYSTOLIC BLOOD PRESSURE: 112 MMHG | DIASTOLIC BLOOD PRESSURE: 65 MMHG

## 2017-05-15 VITALS — SYSTOLIC BLOOD PRESSURE: 116 MMHG | DIASTOLIC BLOOD PRESSURE: 61 MMHG

## 2017-05-15 VITALS — DIASTOLIC BLOOD PRESSURE: 69 MMHG | SYSTOLIC BLOOD PRESSURE: 115 MMHG

## 2017-05-15 VITALS — SYSTOLIC BLOOD PRESSURE: 114 MMHG | DIASTOLIC BLOOD PRESSURE: 63 MMHG

## 2017-05-15 DIAGNOSIS — D63.8: ICD-10-CM

## 2017-05-15 DIAGNOSIS — I10: ICD-10-CM

## 2017-05-15 DIAGNOSIS — Z79.52: ICD-10-CM

## 2017-05-15 DIAGNOSIS — E78.5: ICD-10-CM

## 2017-05-15 DIAGNOSIS — Z90.49: ICD-10-CM

## 2017-05-15 DIAGNOSIS — Z92.3: ICD-10-CM

## 2017-05-15 DIAGNOSIS — Z99.81: ICD-10-CM

## 2017-05-15 DIAGNOSIS — F32.9: ICD-10-CM

## 2017-05-15 DIAGNOSIS — I27.81: ICD-10-CM

## 2017-05-15 DIAGNOSIS — Z87.891: ICD-10-CM

## 2017-05-15 DIAGNOSIS — Z85.118: ICD-10-CM

## 2017-05-15 DIAGNOSIS — E87.4: ICD-10-CM

## 2017-05-15 DIAGNOSIS — I48.2: ICD-10-CM

## 2017-05-15 DIAGNOSIS — M48.50XA: ICD-10-CM

## 2017-05-15 DIAGNOSIS — M81.0: ICD-10-CM

## 2017-05-15 DIAGNOSIS — J96.21: Primary | ICD-10-CM

## 2017-05-15 DIAGNOSIS — E43: ICD-10-CM

## 2017-05-15 DIAGNOSIS — J44.1: ICD-10-CM

## 2017-05-15 DIAGNOSIS — E03.9: ICD-10-CM

## 2017-05-15 DIAGNOSIS — Z88.5: ICD-10-CM

## 2017-05-15 DIAGNOSIS — Z66: ICD-10-CM

## 2017-05-15 DIAGNOSIS — F41.9: ICD-10-CM

## 2017-05-15 DIAGNOSIS — Z91.048: ICD-10-CM

## 2017-05-15 DIAGNOSIS — D69.6: ICD-10-CM

## 2017-05-15 DIAGNOSIS — Z88.6: ICD-10-CM

## 2017-05-15 LAB
ALBUMIN SERPL-MCNC: 2.8 G/DL (ref 3.4–5)
ALBUMIN/GLOB SERPL: 0.8 {RATIO} (ref 1–1.7)
ALP SERPL-CCNC: 69 U/L (ref 46–116)
ALT SERPL-CCNC: 31 U/L (ref 14–59)
ANION GAP SERPL CALC-SCNC: -1 MMOL/L (ref 6–14)
AST SERPL-CCNC: 45 U/L (ref 15–37)
BACTERIA #/AREA URNS HPF: (no result) /HPF
BASOPHILS # BLD AUTO: 0 X10^3/UL (ref 0–0.2)
BASOPHILS NFR BLD: 1 % (ref 0–3)
BILIRUB SERPL-MCNC: 0.2 MG/DL (ref 0.2–1)
BILIRUB UR QL STRIP: NEGATIVE
BUN SERPL-MCNC: 17 MG/DL (ref 7–20)
BUN/CREAT SERPL: 15 (ref 6–20)
CALCIUM SERPL-MCNC: 8.6 MG/DL (ref 8.5–10.1)
CHLORIDE SERPL-SCNC: 99 MMOL/L (ref 98–107)
CK SERPL-CCNC: 84 U/L (ref 26–192)
CKMB MASS: 1.5 NG/ML (ref 0–3.6)
CO2 SERPL-SCNC: 39 MMOL/L (ref 21–32)
CREAT SERPL-MCNC: 1.1 MG/DL (ref 0.6–1)
EOSINOPHIL NFR BLD: 1 % (ref 0–3)
ERYTHROCYTE [DISTWIDTH] IN BLOOD BY AUTOMATED COUNT: 16 % (ref 11.5–14.5)
GFR SERPLBLD BASED ON 1.73 SQ M-ARVRAT: 47.3 ML/MIN
GLOBULIN SER-MCNC: 3.7 G/DL (ref 2.2–3.8)
GLUCOSE SERPL-MCNC: 104 MG/DL (ref 70–99)
GLUCOSE UR STRIP-MCNC: NEGATIVE MG/DL
HCO3 BLDA-SCNC: 41 MMOL/L (ref 21–28)
HCT VFR BLD CALC: 26.1 % (ref 36–47)
HGB BLD-MCNC: 8.3 G/DL (ref 12–15.5)
INSPIRATION SETTING TIME VENT: 40
LYMPHOCYTES # BLD: 1.1 X10^3/UL (ref 1–4.8)
LYMPHOCYTES NFR BLD AUTO: 18 % (ref 24–48)
MCH RBC QN AUTO: 28 PG (ref 25–35)
MCHC RBC AUTO-ENTMCNC: 32 G/DL (ref 31–37)
MCV RBC AUTO: 89 FL (ref 79–100)
MONOCYTES NFR BLD: 15 % (ref 0–9)
NEUTROPHILS NFR BLD AUTO: 66 % (ref 31–73)
NITRITE UR QL STRIP: NEGATIVE
PCO2 BLDA: 64 MMHG (ref 35–46)
PH ABG: 7.42 (ref 7.35–7.45)
PH UR STRIP: 6 [PH]
PLATELET # BLD AUTO: 73 X10^3/UL (ref 140–400)
PO2 BLDA: 79 MMHG (ref 65–108)
POTASSIUM SERPL-SCNC: 4.3 MMOL/L (ref 3.5–5.1)
PROT SERPL-MCNC: 6.5 G/DL (ref 6.4–8.2)
PROT UR STRIP-MCNC: 30 MG/DL
RBC # BLD AUTO: 2.94 X10^6/UL (ref 3.5–5.4)
RBC #/AREA URNS HPF: 0 /HPF (ref 0–2)
SAO2 % BLDA: 96 % (ref 92–99)
SODIUM SERPL-SCNC: 137 MMOL/L (ref 136–145)
SP GR UR STRIP: 1.02
SQUAMOUS #/AREA URNS LPF: (no result) /LPF
UROBILINOGEN UR-MCNC: 1 MG/DL
WBC # BLD AUTO: 6.3 X10^3/UL (ref 4–11)
WBC #/AREA URNS HPF: (no result) /HPF (ref 0–4)

## 2017-05-15 PROCEDURE — 85027 COMPLETE CBC AUTOMATED: CPT

## 2017-05-15 PROCEDURE — 87040 BLOOD CULTURE FOR BACTERIA: CPT

## 2017-05-15 PROCEDURE — 84484 ASSAY OF TROPONIN QUANT: CPT

## 2017-05-15 PROCEDURE — 71010: CPT

## 2017-05-15 PROCEDURE — 94640 AIRWAY INHALATION TREATMENT: CPT

## 2017-05-15 PROCEDURE — 94660 CPAP INITIATION&MGMT: CPT

## 2017-05-15 PROCEDURE — 93005 ELECTROCARDIOGRAM TRACING: CPT

## 2017-05-15 PROCEDURE — 5A09357 ASSISTANCE WITH RESPIRATORY VENTILATION, LESS THAN 24 CONSECUTIVE HOURS, CONTINUOUS POSITIVE AIRWAY PRESSURE: ICD-10-PCS | Performed by: INTERNAL MEDICINE

## 2017-05-15 PROCEDURE — 82805 BLOOD GASES W/O2 SATURATION: CPT

## 2017-05-15 PROCEDURE — 96374 THER/PROPH/DIAG INJ IV PUSH: CPT

## 2017-05-15 PROCEDURE — 85007 BL SMEAR W/DIFF WBC COUNT: CPT

## 2017-05-15 PROCEDURE — 83880 ASSAY OF NATRIURETIC PEPTIDE: CPT

## 2017-05-15 PROCEDURE — 80053 COMPREHEN METABOLIC PANEL: CPT

## 2017-05-15 PROCEDURE — 94760 N-INVAS EAR/PLS OXIMETRY 1: CPT

## 2017-05-15 PROCEDURE — 82553 CREATINE MB FRACTION: CPT

## 2017-05-15 PROCEDURE — 83605 ASSAY OF LACTIC ACID: CPT

## 2017-05-15 PROCEDURE — 87641 MR-STAPH DNA AMP PROBE: CPT

## 2017-05-15 PROCEDURE — 36415 COLL VENOUS BLD VENIPUNCTURE: CPT

## 2017-05-15 PROCEDURE — 81001 URINALYSIS AUTO W/SCOPE: CPT

## 2017-05-15 PROCEDURE — 87086 URINE CULTURE/COLONY COUNT: CPT

## 2017-05-15 PROCEDURE — 80048 BASIC METABOLIC PNL TOTAL CA: CPT

## 2017-05-15 PROCEDURE — 36600 WITHDRAWAL OF ARTERIAL BLOOD: CPT

## 2017-05-15 NOTE — PHYS DOC
Past Medical History


Past Medical History:  A-Fib, Anxiety, Asthma, Bronchitis, Cancer, COPD, 

Depression, Hypothyroid, Pneumonia, Other


Additional Past Medical Histor:  chronic back, lung ca; hernia, HEART DISEASE, 

EMPHESEMA, Osteoporosis


Past Surgical History:  Appendectomy, Cholecystectomy, , Hysterectomy, 

Tonsillectomy, Other


Additional Past Surgical Histo:  back X2, hernia x 3


Alcohol Use:  None


Drug Use:  None





Adult General


Chief Complaint


Chief Complaint:  SHORTNESS OF BREATH





HPI


HPI





Patient is a 84  year old female who presents with complaint of difficulty 

breathing. The patient states her symptoms started this morning. Patient has 

history of COPD and is on 5 L/m of supplemental oxygen at home. Patient states 

that she tried breathing treatments at home with no relief in symptoms. She 

follows with Dr. Tabor for primary care. Patient denies any associated fever 

or chills. Due to worsening symptoms patient called EMS. The patient's oxygen 

saturation saturations were noted to be 83% on her normal O2 sat aches. Patient 

was started on increased supplemental oxygen prior to arrival by EMS. Patient 

denies any abdominal pain or vomiting.





Review of Systems


Review of Systems





Constitutional: Denies fever or chills []


Eyes: Denies change in visual acuity, redness, or eye pain []


HENT: Denies nasal congestion or sore throat []


Respiratory: Cough, shortness of breath []


Cardiovascular: Denies chest pain or edema []


GI: Denies abdominal pain, nausea, vomiting, bloody stools or diarrhea []


: Denies dysuria or hematuria []


Musculoskeletal: Denies back pain or joint pain []


Integument: Denies rash or skin lesions []


Neurologic: Denies headache, focal weakness or sensory changes []





Current Medications


Current Medications





Current Medications








 Medications


  (Trade)  Dose


 Ordered  Sig/Franky  Start Time


 Stop Time Status Last Admin


Dose Admin


 


 Albuterol/


 Ipratropium


  (Duoneb)  6 ml  1X  ONCE  5/15/17 18:30


 5/15/17 18:31 DC 5/15/17 18:08


6 ML


 


 Methylprednisolone


 Sodium Succinate


  (SOLU-Medrol


 125MG VIAL)  125 mg  1X  ONCE  5/15/17 18:30


 5/15/17 18:31 DC 5/15/17 19:54


125 MG


 


 Sodium Chloride  1,000 ml @ 


 100 mls/hr  Q10H  5/15/17 18:30


 17 04:29  5/15/17 20:20


100 MLS/HR











Allergies


Allergies





Allergies








Coded Allergies Type Severity Reaction Last Updated Verified


 


  morphine Allergy Intermediate PER DAUGHTER, DOES NOT AGREE WITH PT 14 Yes


 


  adhesive Adverse Reaction Intermediate blisters 4/22/15 Yes


 


  aspirin Adverse Reaction Intermediate Nausea and Vomiting, "makes me sick" 4/

22/15 Yes











Physical Exam


Physical Exam





Constitutional: Alert, afebrile, appears in severe respiratory distress. []


HENT: Normocephalic, atraumatic, bilateral external ears normal, oropharynx 

moist, no oral exudates, nose normal. []


Eyes: PERRLA, EOMI, conjunctiva normal, no discharge. [] 


Neck: Normal range of motion, no tenderness, supple, no stridor. [] 


Cardiovascular:Heart rate regular rhythm, no murmur []


Lungs & Thorax: Severe restriction of air movement bilaterally, expiratory 

wheezes bilaterally, no rales []


Abdomen: Bowel sounds normal, soft, no tenderness, no masses, no pulsatile 

masses. [] 


Skin: Warm, dry, no erythema, no rash. [] 


Back: No tenderness, no CVA tenderness. [] 


Extremities: No tenderness, no cyanosis, no clubbing, ROM intact, no edema. [] 


Neurologic: Alert and oriented X 3, normal motor function, normal sensory 

function, no focal deficits noted. []





Current Patient Data


Vital Signs





 Vital Signs








  Date Time  Temp Pulse Resp B/P (MAP) Pulse Ox O2 Delivery O2 Flow Rate FiO2


 


5/15/17 19:53     93 BiPAP/CPAP  


 


5/15/17 17:35 99.1 74 30 117/56 (76)   5.0 





 99.1       








Lab Values





 Laboratory Tests








Test


  5/15/17


18:30


 


White Blood Count


  6.3 x10^3/uL


(4.0-11.0)


 


Red Blood Count


  2.94 x10^6/uL


(3.50-5.40)  L


 


Hemoglobin


  8.3 g/dL


(12.0-15.5)  L


 


Hematocrit


  26.1 %


(36.0-47.0)  L


 


Mean Corpuscular Volume


  89 fL ()


 


 


Mean Corpuscular Hemoglobin 28 pg (25-35)  


 


Mean Corpuscular Hemoglobin


Concent 32 g/dL


(31-37)


 


Red Cell Distribution Width


  16.0 %


(11.5-14.5)  H


 


Platelet Count


  73 x10^3/uL


(140-400)  L


 


Neutrophils (%) (Auto) 66 % (31-73)  


 


Lymphocytes (%) (Auto) 18 % (24-48)  L


 


Monocytes (%) (Auto) 15 % (0-9)  H


 


Eosinophils (%) (Auto) 1 % (0-3)  


 


Basophils (%) (Auto) 1 % (0-3)  


 


Neutrophils # (Auto)


  4.1 x10^3uL


(1.8-7.7)


 


Lymphocytes # (Auto)


  1.1 x10^3/uL


(1.0-4.8)


 


Monocytes # (Auto)


  0.9 x10^3/uL


(0.0-1.1)


 


Eosinophils # (Auto)


  0.1 x10^3/uL


(0.0-0.7)


 


Basophils # (Auto)


  0.0 x10^3/uL


(0.0-0.2)


 


Sodium Level


  137 mmol/L


(136-145)


 


Potassium Level


  4.3 mmol/L


(3.5-5.1)


 


Chloride Level


  99 mmol/L


()


 


Carbon Dioxide Level


  39 mmol/L


(21-32)  H


 


Anion Gap -1 (6-14)  L


 


Blood Urea Nitrogen


  17 mg/dL


(7-20)


 


Creatinine


  1.1 mg/dL


(0.6-1.0)  H


 


Estimated GFR


(Cockcroft-Gault) 47.3  


 


 


BUN/Creatinine Ratio 15 (6-20)  


 


Glucose Level


  104 mg/dL


(70-99)  H


 


Lactic Acid Level


  1.2 mmol/L


(0.4-2.0)


 


Calcium Level


  8.6 mg/dL


(8.5-10.1)


 


Total Bilirubin


  0.2 mg/dL


(0.2-1.0)


 


Aspartate Amino Transferase


(AST) 45 U/L (15-37)


H


 


Alanine Aminotransferase (ALT)


  31 U/L (14-59)


 


 


Alkaline Phosphatase


  69 U/L


()


 


Creatine Kinase


  84 U/L


()


 


Creatine Kinase MB (Mass)


  1.5 ng/mL


(0.0-3.6)


 


Creatine Kinase MB Relative


Index 1.8 % (0-4)  


 


 


Troponin I Quantitative


  < 0.017 ng/mL


(0.000-0.055)


 


NT-Pro-B-Type Natriuretic


Peptide 1994 pg/mL


(0-449)  H


 


Total Protein


  6.5 g/dL


(6.4-8.2)


 


Albumin


  2.8 g/dL


(3.4-5.0)  L


 


Albumin/Globulin Ratio


  0.8 (1.0-1.7)


L





 Laboratory Tests


5/15/17 18:30








 Laboratory Tests


5/15/17 18:30











EKG


EKG


Interpreted by me: Heart rate 92, sinus rhythm, multiple PACs, leftward axis, 

no acute ST/T-wave abnormalities present []





Radiology/Procedures


Radiology/Procedures


One view AP chest x-ray interpreted by me: No acute infiltrates or effusions, 

normal cardiac silhouette []





Course & Med Decision Making


Course & Med Decision Making


Pertinent Labs and Imaging studies reviewed. (See chart for details)





The patient was started on IV Therapy in the emergency department with 

improvement in work of breathing and oxygenation. Patient's workup does not 

reveal evidence of acute pneumonia. The patient was started on IV Solu-Medrol 

and given 2 DuoNeb treatments. The patient will be admitted the hospital for 

further care. I spoke with Dr. Colindres who is on-call for Dr. Tabor and he 

accepted care patient in hospital.





Critical care time excluding procedures: 45 minutes.





Dragon Disclaimer


Dragon Disclaimer


This electronic medical record was generated, in whole or in part, using a 

voice recognition dictation system.





Departure


Departure


Impression:  


 Primary Impression:  


 Acute on chronic respiratory failure


 Additional Impressions:  


 Normocytic anemia


 Severe protein-calorie malnutrition


Disposition:   ADMITTED AS INPATIENT


Admitting Physician:  Carie Colindres


Condition:  GUARDED


Referrals:  


SUMMER TABOR MD (PCP)





Problem Qualifiers








 Primary Impression:  


 Acute on chronic respiratory failure


 Respiratory failure complication:  hypoxia  Qualified Codes:  J96.21 - Acute 

and chronic respiratory failure with hypoxia








NORMA SUMNER MD May 15, 2017 17:55

## 2017-05-16 VITALS — SYSTOLIC BLOOD PRESSURE: 96 MMHG | DIASTOLIC BLOOD PRESSURE: 54 MMHG

## 2017-05-16 VITALS — SYSTOLIC BLOOD PRESSURE: 97 MMHG | DIASTOLIC BLOOD PRESSURE: 67 MMHG

## 2017-05-16 VITALS — SYSTOLIC BLOOD PRESSURE: 150 MMHG | DIASTOLIC BLOOD PRESSURE: 80 MMHG

## 2017-05-16 VITALS — DIASTOLIC BLOOD PRESSURE: 55 MMHG | SYSTOLIC BLOOD PRESSURE: 96 MMHG

## 2017-05-16 VITALS — SYSTOLIC BLOOD PRESSURE: 118 MMHG | DIASTOLIC BLOOD PRESSURE: 77 MMHG

## 2017-05-16 VITALS — DIASTOLIC BLOOD PRESSURE: 54 MMHG | SYSTOLIC BLOOD PRESSURE: 96 MMHG

## 2017-05-16 VITALS — DIASTOLIC BLOOD PRESSURE: 67 MMHG | SYSTOLIC BLOOD PRESSURE: 108 MMHG

## 2017-05-16 VITALS — DIASTOLIC BLOOD PRESSURE: 63 MMHG | SYSTOLIC BLOOD PRESSURE: 111 MMHG

## 2017-05-16 VITALS — DIASTOLIC BLOOD PRESSURE: 78 MMHG | SYSTOLIC BLOOD PRESSURE: 120 MMHG

## 2017-05-16 VITALS — SYSTOLIC BLOOD PRESSURE: 140 MMHG | DIASTOLIC BLOOD PRESSURE: 71 MMHG

## 2017-05-16 VITALS — DIASTOLIC BLOOD PRESSURE: 61 MMHG | SYSTOLIC BLOOD PRESSURE: 121 MMHG

## 2017-05-16 VITALS — DIASTOLIC BLOOD PRESSURE: 67 MMHG | SYSTOLIC BLOOD PRESSURE: 136 MMHG

## 2017-05-16 VITALS — SYSTOLIC BLOOD PRESSURE: 141 MMHG | DIASTOLIC BLOOD PRESSURE: 73 MMHG

## 2017-05-16 LAB
ANION GAP SERPL CALC-SCNC: 6 MMOL/L (ref 6–14)
BASOPHILS # BLD AUTO: 0 X10^3/UL (ref 0–0.2)
BASOPHILS NFR BLD: 0 % (ref 0–3)
BUN SERPL-MCNC: 16 MG/DL (ref 7–20)
CALCIUM SERPL-MCNC: 8.6 MG/DL (ref 8.5–10.1)
CHLORIDE SERPL-SCNC: 99 MMOL/L (ref 98–107)
CO2 SERPL-SCNC: 32 MMOL/L (ref 21–32)
CREAT SERPL-MCNC: 1.1 MG/DL (ref 0.6–1)
EOSINOPHIL NFR BLD: 0 % (ref 0–3)
ERYTHROCYTE [DISTWIDTH] IN BLOOD BY AUTOMATED COUNT: 16.3 % (ref 11.5–14.5)
GFR SERPLBLD BASED ON 1.73 SQ M-ARVRAT: 47.3 ML/MIN
GLUCOSE SERPL-MCNC: 185 MG/DL (ref 70–99)
HCT VFR BLD CALC: 25.7 % (ref 36–47)
HGB BLD-MCNC: 8.4 G/DL (ref 12–15.5)
LYMPHOCYTES # BLD: 0.5 X10^3/UL (ref 1–4.8)
LYMPHOCYTES NFR BLD AUTO: 11 % (ref 24–48)
MCH RBC QN AUTO: 29 PG (ref 25–35)
MCHC RBC AUTO-ENTMCNC: 33 G/DL (ref 31–37)
MCV RBC AUTO: 88 FL (ref 79–100)
MONOCYTES NFR BLD: 3 % (ref 0–9)
NEUTROPHILS NFR BLD AUTO: 85 % (ref 31–73)
PLATELET # BLD AUTO: 125 X10^3/UL (ref 140–400)
PLATELET # BLD EST: (no result) 10*3/UL
POTASSIUM SERPL-SCNC: 4.2 MMOL/L (ref 3.5–5.1)
RBC # BLD AUTO: 2.93 X10^6/UL (ref 3.5–5.4)
SODIUM SERPL-SCNC: 137 MMOL/L (ref 136–145)
WBC # BLD AUTO: 4.2 X10^3/UL (ref 4–11)

## 2017-05-16 RX ADMIN — DIGOXIN SCH MCG: 125 TABLET ORAL at 09:34

## 2017-05-16 RX ADMIN — CYANOCOBALAMIN TAB 1000 MCG SCH MCG: 1000 TAB at 09:33

## 2017-05-16 RX ADMIN — IPRATROPIUM BROMIDE AND ALBUTEROL SULFATE SCH ML: .5; 3 SOLUTION RESPIRATORY (INHALATION) at 12:00

## 2017-05-16 RX ADMIN — IPRATROPIUM BROMIDE AND ALBUTEROL SULFATE SCH ML: .5; 3 SOLUTION RESPIRATORY (INHALATION) at 15:22

## 2017-05-16 RX ADMIN — ESCITALOPRAM OXALATE SCH MG: 10 TABLET ORAL at 09:33

## 2017-05-16 RX ADMIN — METHYLPREDNISOLONE SODIUM SUCCINATE SCH MG: 125 INJECTION, POWDER, FOR SOLUTION INTRAMUSCULAR; INTRAVENOUS at 16:57

## 2017-05-16 RX ADMIN — IPRATROPIUM BROMIDE AND ALBUTEROL SULFATE SCH ML: .5; 3 SOLUTION RESPIRATORY (INHALATION) at 20:33

## 2017-05-16 RX ADMIN — TRAZODONE HYDROCHLORIDE SCH MG: 100 TABLET ORAL at 20:34

## 2017-05-16 RX ADMIN — METHYLPREDNISOLONE SODIUM SUCCINATE SCH MG: 125 INJECTION, POWDER, FOR SOLUTION INTRAMUSCULAR; INTRAVENOUS at 03:30

## 2017-05-16 RX ADMIN — DOCUSATE SODIUM SCH MG: 100 CAPSULE, LIQUID FILLED ORAL at 09:00

## 2017-05-16 RX ADMIN — LEVOTHYROXINE SODIUM SCH MCG: 88 TABLET ORAL at 16:57

## 2017-05-16 RX ADMIN — METHYLPREDNISOLONE SODIUM SUCCINATE SCH MG: 125 INJECTION, POWDER, FOR SOLUTION INTRAMUSCULAR; INTRAVENOUS at 06:35

## 2017-05-16 RX ADMIN — SENNOSIDES A AND B SCH MG: 8.6 TABLET, FILM COATED ORAL at 09:00

## 2017-05-16 RX ADMIN — METHYLPREDNISOLONE SODIUM SUCCINATE SCH MG: 125 INJECTION, POWDER, FOR SOLUTION INTRAMUSCULAR; INTRAVENOUS at 13:02

## 2017-05-16 RX ADMIN — METHYLPREDNISOLONE SODIUM SUCCINATE SCH MG: 125 INJECTION, POWDER, FOR SOLUTION INTRAMUSCULAR; INTRAVENOUS at 23:21

## 2017-05-16 NOTE — EKG
[facility]

              [address] [place] [address]

Test Date:    2017-05-15               Test Time:    18:44:47

Pat Name:     YAN COLLINS             Department:   

Patient ID:   [id number]           Room:         112 1

Gender:       F                        Technician:   

:          [id number]               Requested By: NORMA SUMNER

Order Number: [id number]            Reading MD:   Carrington Alvarado

                                 Measurements

Intervals                              Axis          

Rate:         92                       P:            

CO:                                    QRS:          -26

QRSD:         72                       T:            0

QT:           332                                    

QTc:          415                                    

                           Interpretive Statements

SR

PACS

Electronically Signed On 2017 10:54:16 CDT by Carrington Alvarado

## 2017-05-16 NOTE — CONS
DATE OF CONSULTATION:  



ATTENDING PHYSICIAN:  Dr. Couch.



REASON FOR CONSULTATION:  Dyspnea and respiratory failure.



HISTORY OF PRESENT ILLNESS:  The patient is an 84-year-old female, who has a

history of chronic obstructive airway disease on home oxygen.  She also has

history of lung cancer ____ in the right lower lobe, status post treatment with

remission.  She presented to the hospital with 2-day history of shortness of

breath.  She had no cough, no chest pains.  Normally, she is on 5 liters of

oxygen at home.  The patient had no fever, no chills, no leg edema.  The patient

was seen in the Emergency Room with saturation of 83% on normal oxygen levels. 

She had increase in her supplemental oxygen.  In the ER, arterial blood gases

were performed and showed a pH of 7.42, pCO2 of 64 and pO2 of 79 on 40% FiO2. 

She was placed on BiPAP to improve her work of breathing.  I have reviewed the

patient's chest x-ray, which shows slightly prominent interstitial markings.  No

definite masses were seen.  Consultation requested for further evaluation and

management.   She is currently off the BiPAP.



PAST MEDICAL HISTORY:  Significant for history of AFib, history of anxiety,

history of asthma, history of COPD, chronic respiratory failure, history of lung

cancer status post radiation with good remission and osteoporosis.



PAST SURGICAL HISTORY:  Appendectomy, cholecystectomy, , hysterectomy,

tonsillectomy, back surgery and hernia surgery.



ALLERGIES:  ADHESIVE, ASPIRIN AND MORPHINE.



SOCIAL HISTORY:  Long history of tobacco use, but no longer smokes cigarettes.



MEDICATIONS:  All reviewed as listed in the MRAD.



REVIEW OF SYSTEMS:  Twelve-point system obtained.  Pertinent positives discussed

in history of present illness, otherwise noncontributory.  All systems that were

negative were reviewed as well.



FAMILY HISTORY:  Noncontributory to lungs.



PHYSICAL EXAMINATION:

VITAL SIGNS:  Blood pressure 97/67, afebrile, pulse ox 91% on 5 liters.

HEENT:  Sclerae nonicteric.

NECK:  Supple.

LUNGS:  Diminished breath sounds.

CARDIOVASCULAR:  Regular rate and rhythm.

ABDOMEN:  Soft, nontender.

EXTREMITIES:  With trace pitting edema.



LABORATORY DATA:  Reviewed.  ABGs as discussed in my history of present illness.

 BUN is 16, creatinine 1.1.



IMPRESSION:

1.  Dyspnea secondary to acute exacerbation of chronic obstructive pulmonary

disease and possible mild diastolic heart failure.

2.  History of lung cancer status post radiation to the right lower lobe with

the remission.

3.  Underlying severe chronic obstructive pulmonary disease with acute on 
chronic

respiratory failure due to AECOPD, requiring BIPAP on admission



RECOMMENDATIONS:

1.  Discussed with RN.  We will monitor off the BiPAP and continue nasal

cannula, would use BiPAP p.r.n.

2.  Continue IV fluids and use p.r.n. Lasix.

3.  Continue nebulizer treatments.

4.  Continue IV steroids with taper.

5.  Antibiotics can be deescalated soon.

6.  Transfer out of the ICU.

 



______________________________

EDDIE ANDRE MD



DR:  CATARINA/aidan  JOB#:  [id number] / [id number]

DD:  2017 10:40  DT:  2017 11:17



SUMMER Mai MD

MTDD

## 2017-05-16 NOTE — HP
ADMIT DATE:  05/15/2017



REASON FOR ADMISSION TO THE HOSPITAL:  COPD with acute exacerbation.



HISTORY OF PRESENT ILLNESS:  The patient is an 84-year-old female with history

of chronic COPD, chronic AFib, not a candidate for anticoagulation secondary to

falls, osteoporosis, had lung cancer, had radiation treatment, COPD, home

oxygen, hypothyroidism and the patient was having shortness of breath, came to

the Emergency Room.  She has acute CO2 retention and was placed on BiPAP, was

admitted to the ICU.  The patient was given IV Solu-Medrol, oxygen and breathing

treatment.



PAST MEDICAL HISTORY:  History of lung cancer, had radiation treatment 5 years

ago, COPD, hypertension, hyperlipidemia, anxiety, hypothyroidism, compression

fractures of the spine and multiple vertebroplasties.



PAST SURGICAL HISTORY:  Appendectomy, cholecystectomy, , hysterectomy,

tonsillectomy, vertebroplasties, back surgeries x 2 and hernia surgeries.



PERSONAL HISTORY:  The patient stopped smoking, used to smoke in the past at

least 30-40 years.  Denies alcohol or street drugs.  The patient is on home

oxygen and nebulizer at home.  The patient wishes DNR.



ALLERGIES:  TAPE, ASPIRIN AND MORPHINE.



MEDICATIONS AT HOME:  She is on prednisone, was given last week, 50 mg daily,

Xanax 0.25 three times a day, vitamin B12 1000 mcg daily, digoxin 125 mcg daily,

Colace 100 mg daily, Lexapro 10 mg daily, DuoNeb 4 times daily, levothyroxine 88

mcg daily, Zofran for nausea, oxycodone 10/325 q. 6 p.r.n. for pain, Phenergan,

senna and trazodone 100 mg daily.



FAMILY HISTORY:  Unremarkable.



SOCIAL HISTORY:  Lives at home.  She has a neighbor who helps her.



REVIEW OF SYSTEMS:

CARDIAC:  No chest pain.

LUNGS:  Short of breath with wheezing, pursed breathing.

GASTROINTESTINAL:  No nausea or vomiting.  The patient has some clear white

phlegm.

Rest of the 14-system was reviewed and negative.



PHYSICAL EXAMINATION:

GENERAL:  The patient looks chronically sick, COPD, has pursed breathing and

flushed face from chronic steroid use.

VITAL SIGNS:  Temperature 98, pulse 106, respirations 35, blood pressure 136/67,

____ the patient was on BiPAP initially.

HEENT:  Head is atraumatic.  Pupils equal.  Oral cavity, dentures, has a moon

face.

NECK:  Supple.  Thyroid not enlarged.  JVD not elevated.

CHEST:  Symmetrical, COPD pattern, decreased breath sounds.

CARDIOVASCULAR:  S1, S2.

LUNGS:  Diminished breath sounds.

ABDOMEN:  Soft, bowel sounds present, no mass palpable.

EXTERNAL GENITALIA:  No Gusman.

RECTAL:  Deferred.

EXTREMITIES:  No calf tenderness.  The patient has a scar in the back, has some

kyphosis from osteoporosis.

NEUROLOGIC:  Cranial nerves intact.  Power 5/5 in all extremities.  The patient

is on oxygen, has pursed breathing now, able to make conversation slowly.



LABORATORY DATA:  Shows a white count of 6, hemoglobin 8.3, platelets 73. 

Electrolytes show sodium 137, potassium 4.3, chloride 99, bicarbonate 40, BUN

17, creatinine 1.1, glucose 104.  Lactic acid 1.2.  LFTs were normal.  BNP 1994.

 Troponin 0.017.  Blood gas shows pH of 7.42, pCO2 of 64, pO2 79, bicarbonate of

41.  Chest x-ray, interstitial pulmonary infiltrates, some scarring.  EKG done,

report is pending.



FINAL IMPRESSION:

1.  Chronic obstructive pulmonary disease with acute exacerbation.

2.  Chronic chronic obstructive pulmonary disease.

3.  History of lung cancer, status post radiation treatment 5 years ago.

4.  Anxiety.

5.  Depression.

6.  Hypothyroidism.

7.  Osteoporosis, compression fractures of the spine, secondary to long term

steroid use.  The patient is on calcium and vitamin D and Boniva, but she does

not take Boniva.

8.  Thrombocytopenia.

9.  Anemia of chronic disease.



PLAN:  At this time was admit to the hospital, IV Solu-Medrol, oxygen and

breathing treatment, BiPAP p.r.n.  The patient was consulted by Pulmonology. 

Hold off on the Lovenox secondary to low platelets.  If continues to go low, I

will get Hematology and the patient wishes DNR, does not want to be intubated.

 



______________________________

SUMMER TABOR MD



DR:  RAGINI/aidan  JOB#:  [id number] / [id number]

DD:  2017 10:00  DT:  2017 11:51

## 2017-05-16 NOTE — PDOC
Provider Note


Provider Note


Pt seen.H&P dictated.


#[id number].











SUMMER TABOR MD May 16, 2017 10:01

## 2017-05-16 NOTE — RAD
Portable chest, 5/15/2017:



History: Shortness of breath



Comparison is made to a study from 3/2/2017. The heart is at the upper limits

of normal in size. There is calcific plaquing of the aorta. There is mild

interstitial prominence in the lower chest with Kerley B lines evident. The

findings suggest low-grade interstitial edema, although there is likely a

component of basilar scarring. No definite pleural fluid is seen. A hiatal

hernia is present.



The bony structures are demineralized. Vertebroplasty changes are present at

multiple levels in the thoracic and lumbar spine.





IMPRESSION:   Mild congestive heart failure with mild interstitial pulmonary

edema superimposed upon basilar scarring.

## 2017-05-16 NOTE — ACF
Admission Forms Criteria


                                 RESPIRATORY FAILURE [facility]





Clinical Indications for Admission to Inpatient Care





                                                                    (Place 'X' 

for any and all applicable criteria):


                                                                          


Hospital admission is needed for appropriate care of the patient because of 

acute respiratory failure or insufficiency 


as indicated by ANY ONE of the following(1)(2)(3)(4)(5)(6)(7)(8):





[X ]I.    Mechanical ventilation needed (acute invasive or noninvasive)


[ ]II.   Severe ventilation deficit as indicated by ANY ONE of the following (9)


        [ ]a)  Respiratory acidosis (pH less than 7.32 and partial pressure of 

carbon dioxide greater 


                than 40 mm Hg (5.3 kPa))


        [ ]b)  Partial pressure of carbon dioxide greater than 44 mm Hg (5.9 kPa

) (new)


        [ ]c)  Airflow measurements less than 25% of predicted (eg, peak 

expiratory flow rate 


                less than 100 L/minute)


        [ ]d)  Forced vital capacity less than 15 mL/kg of ideal body weight, 

or 50% decrease in 


                vital capacity from baseline


[ ]III.  Noncardiac pulmonary edema not resolving with rapid emergency 

treatment (8)


[ ]IV. Severe respiratory distress as indicated by ANY ONE of the following:


        [ ]a)  Severe tachypnea (respiratory rate greater than 30, greater than 

45 for 6-month-old, 


                greater than 60 for )


        [ ]b)  Severe hypoxemia (partial pressure of oxygen less than 50 mm Hg (

6.7 kPa) on greater


                than 50% oxygen or partial pressure of oxygen to FIO2 ratio 

less than 200)


        [ ]c)  Mental status deterioration from respiratory disease


[ ]V.  Airway obstruction or inadequate protection [A](10)(11) 











The original [employer] content created by [employer] has been revised. 


The portions of the content which have been revised are identified through the 

use of italic text or in bold, and [employer] 


has neither  reviewed nor approved the modified material. All other unmodified 

content is copyright  [employer].





Please see references footnoted in the original [employer] edition 

2016


Admission Criteria Met?:  Yes











GAIL BONDS May 16, 2017 00:36

## 2017-05-17 VITALS — SYSTOLIC BLOOD PRESSURE: 110 MMHG | DIASTOLIC BLOOD PRESSURE: 65 MMHG

## 2017-05-17 VITALS — SYSTOLIC BLOOD PRESSURE: 126 MMHG | DIASTOLIC BLOOD PRESSURE: 66 MMHG

## 2017-05-17 VITALS — DIASTOLIC BLOOD PRESSURE: 77 MMHG | SYSTOLIC BLOOD PRESSURE: 129 MMHG

## 2017-05-17 VITALS — DIASTOLIC BLOOD PRESSURE: 77 MMHG | SYSTOLIC BLOOD PRESSURE: 143 MMHG

## 2017-05-17 VITALS — DIASTOLIC BLOOD PRESSURE: 70 MMHG | SYSTOLIC BLOOD PRESSURE: 133 MMHG

## 2017-05-17 LAB
ANION GAP SERPL CALC-SCNC: (no result) MMOL/L (ref 6–14)
BASOPHILS # BLD AUTO: 0 X10^3/UL (ref 0–0.2)
BASOPHILS NFR BLD: 0 % (ref 0–3)
BUN SERPL-MCNC: 19 MG/DL (ref 7–20)
CALCIUM SERPL-MCNC: 8.8 MG/DL (ref 8.5–10.1)
CHLORIDE SERPL-SCNC: 100 MMOL/L (ref 98–107)
CO2 SERPL-SCNC: 38 MMOL/L (ref 21–32)
CREAT SERPL-MCNC: 1 MG/DL (ref 0.6–1)
EOSINOPHIL NFR BLD: 0 % (ref 0–3)
ERYTHROCYTE [DISTWIDTH] IN BLOOD BY AUTOMATED COUNT: 16 % (ref 11.5–14.5)
GFR SERPLBLD BASED ON 1.73 SQ M-ARVRAT: 52.8 ML/MIN
GLUCOSE SERPL-MCNC: 117 MG/DL (ref 70–99)
HCT VFR BLD CALC: 25.1 % (ref 36–47)
HGB BLD-MCNC: 8.1 G/DL (ref 12–15.5)
LYMPHOCYTES # BLD: 0.5 X10^3/UL (ref 1–4.8)
LYMPHOCYTES NFR BLD AUTO: 5 % (ref 24–48)
MCH RBC QN AUTO: 28 PG (ref 25–35)
MCHC RBC AUTO-ENTMCNC: 32 G/DL (ref 31–37)
MCV RBC AUTO: 88 FL (ref 79–100)
MONOCYTES NFR BLD: 3 % (ref 0–9)
NEUTROPHILS NFR BLD AUTO: 91 % (ref 31–73)
PLATELET # BLD AUTO: 122 X10^3/UL (ref 140–400)
POTASSIUM SERPL-SCNC: 5 MMOL/L (ref 3.5–5.1)
RBC # BLD AUTO: 2.87 X10^6/UL (ref 3.5–5.4)
SODIUM SERPL-SCNC: 137 MMOL/L (ref 136–145)
WBC # BLD AUTO: 8.7 X10^3/UL (ref 4–11)

## 2017-05-17 RX ADMIN — METHYLPREDNISOLONE SODIUM SUCCINATE SCH MG: 125 INJECTION, POWDER, FOR SOLUTION INTRAMUSCULAR; INTRAVENOUS at 23:15

## 2017-05-17 RX ADMIN — CYANOCOBALAMIN TAB 1000 MCG SCH MCG: 1000 TAB at 08:25

## 2017-05-17 RX ADMIN — LEVOTHYROXINE SODIUM SCH MCG: 88 TABLET ORAL at 06:12

## 2017-05-17 RX ADMIN — DIGOXIN SCH MCG: 125 TABLET ORAL at 08:24

## 2017-05-17 RX ADMIN — METHYLPREDNISOLONE SODIUM SUCCINATE SCH MG: 125 INJECTION, POWDER, FOR SOLUTION INTRAMUSCULAR; INTRAVENOUS at 06:12

## 2017-05-17 RX ADMIN — TRAZODONE HYDROCHLORIDE SCH MG: 100 TABLET ORAL at 21:55

## 2017-05-17 RX ADMIN — IPRATROPIUM BROMIDE AND ALBUTEROL SULFATE SCH ML: .5; 3 SOLUTION RESPIRATORY (INHALATION) at 19:51

## 2017-05-17 RX ADMIN — METHYLPREDNISOLONE SODIUM SUCCINATE SCH MG: 125 INJECTION, POWDER, FOR SOLUTION INTRAMUSCULAR; INTRAVENOUS at 18:40

## 2017-05-17 RX ADMIN — IPRATROPIUM BROMIDE AND ALBUTEROL SULFATE SCH ML: .5; 3 SOLUTION RESPIRATORY (INHALATION) at 07:29

## 2017-05-17 RX ADMIN — METHYLPREDNISOLONE SODIUM SUCCINATE SCH MG: 125 INJECTION, POWDER, FOR SOLUTION INTRAMUSCULAR; INTRAVENOUS at 11:53

## 2017-05-17 RX ADMIN — IPRATROPIUM BROMIDE AND ALBUTEROL SULFATE SCH ML: .5; 3 SOLUTION RESPIRATORY (INHALATION) at 11:36

## 2017-05-17 RX ADMIN — SENNOSIDES A AND B SCH MG: 8.6 TABLET, FILM COATED ORAL at 08:26

## 2017-05-17 RX ADMIN — ESCITALOPRAM OXALATE SCH MG: 10 TABLET ORAL at 08:24

## 2017-05-17 RX ADMIN — DOCUSATE SODIUM SCH MG: 100 CAPSULE, LIQUID FILLED ORAL at 08:26

## 2017-05-17 RX ADMIN — IPRATROPIUM BROMIDE AND ALBUTEROL SULFATE SCH ML: .5; 3 SOLUTION RESPIRATORY (INHALATION) at 16:05

## 2017-05-17 NOTE — PDOC
PULMONARY PROGRESS NOTES


Subjective


feels better


Vitals





Vital Signs








  Date Time  Temp Pulse Resp B/P (MAP) Pulse Ox O2 Delivery O2 Flow Rate FiO2


 


5/17/17 11:37      Nasal Cannula 5.0 


 


5/17/17 11:00 98.1 81 24 133/70 (91) 92   





 98.1       








General:  Alert, No acute distress


HEENT:  Other


Lungs:  Other (decrease bs)


Cardiovascular:  S1, S2


Abdomen:  Soft, Non-tender


Extremities:  No Edema


Labs





Laboratory Tests








Test


  5/15/17


18:30 5/15/17


19:38 5/15/17


20:20 5/15/17


21:50


 


White Blood Count


  6.3 x10^3/uL


(4.0-11.0) 


  


  


 


 


Red Blood Count


  2.94 x10^6/uL


(3.50-5.40) 


  


  


 


 


Hemoglobin


  8.3 g/dL


(12.0-15.5) 


  


  


 


 


Hematocrit


  26.1 %


(36.0-47.0) 


  


  


 


 


Mean Corpuscular Volume 89 fL ()    


 


Mean Corpuscular Hemoglobin 28 pg (25-35)    


 


Mean Corpuscular Hemoglobin


Concent 32 g/dL


(31-37) 


  


  


 


 


Red Cell Distribution Width


  16.0 %


(11.5-14.5) 


  


  


 


 


Platelet Count


  73 x10^3/uL


(140-400) 


  


  


 


 


Neutrophils (%) (Auto) 66 % (31-73)    


 


Lymphocytes (%) (Auto) 18 % (24-48)    


 


Monocytes (%) (Auto) 15 % (0-9)    


 


Eosinophils (%) (Auto) 1 % (0-3)    


 


Basophils (%) (Auto) 1 % (0-3)    


 


Neutrophils # (Auto)


  4.1 x10^3uL


(1.8-7.7) 


  


  


 


 


Lymphocytes # (Auto)


  1.1 x10^3/uL


(1.0-4.8) 


  


  


 


 


Monocytes # (Auto)


  0.9 x10^3/uL


(0.0-1.1) 


  


  


 


 


Eosinophils # (Auto)


  0.1 x10^3/uL


(0.0-0.7) 


  


  


 


 


Basophils # (Auto)


  0.0 x10^3/uL


(0.0-0.2) 


  


  


 


 


Sodium Level


  137 mmol/L


(136-145) 


  


  


 


 


Potassium Level


  4.3 mmol/L


(3.5-5.1) 


  


  


 


 


Chloride Level


  99 mmol/L


() 


  


  


 


 


Carbon Dioxide Level


  39 mmol/L


(21-32) 


  


  


 


 


Anion Gap -1 (6-14)    


 


Blood Urea Nitrogen


  17 mg/dL


(7-20) 


  


  


 


 


Creatinine


  1.1 mg/dL


(0.6-1.0) 


  


  


 


 


Estimated GFR


(Cockcroft-Gault) 47.3 


  


  


  


 


 


BUN/Creatinine Ratio 15 (6-20)    


 


Glucose Level


  104 mg/dL


(70-99) 


  


  


 


 


Lactic Acid Level


  1.2 mmol/L


(0.4-2.0) 


  


  


 


 


Calcium Level


  8.6 mg/dL


(8.5-10.1) 


  


  


 


 


Total Bilirubin


  0.2 mg/dL


(0.2-1.0) 


  


  


 


 


Aspartate Amino Transf


(AST/SGOT) 45 U/L (15-37) 


  


  


  


 


 


Alanine Aminotransferase


(ALT/SGPT) 31 U/L (14-59) 


  


  


  


 


 


Alkaline Phosphatase


  69 U/L


() 


  


  


 


 


Creatine Kinase


  84 U/L


() 


  


  


 


 


Creatine Kinase MB (Mass)


  1.5 ng/mL


(0.0-3.6) 


  


  


 


 


Creatine Kinase MB Relative


Index 1.8 % (0-4) 


  


  


  


 


 


Troponin I Quantitative


  < 0.017 ng/mL


(0.000-0.055) 


  


  


 


 


NT-Pro-B-Type Natriuretic


Peptide 1994 pg/mL


(0-449) 


  


  


 


 


Total Protein


  6.5 g/dL


(6.4-8.2) 


  


  


 


 


Albumin


  2.8 g/dL


(3.4-5.0) 


  


  


 


 


Albumin/Globulin Ratio 0.8 (1.0-1.7)    


 


O2 Saturation  96 % (92-99)   


 


Arterial Blood pH


  


  7.42


(7.35-7.45) 


  


 


 


Arterial Blood pCO2 at


Patient Temp 


  64 mmHg


(35-46) 


  


 


 


Arterial Blood pO2 at Patient


Temp 


  79 mmHg


() 


  


 


 


Arterial Blood HCO3


  


  41 mmol/L


(21-28) 


  


 


 


Arterial Blood Base Excess


  


  14 mmol/L


(-3-3) 


  


 


 


FiO2  40.0   


 


Urine Color   Yellow  


 


Urine Clarity   Clear  


 


Urine pH   6.0  


 


Urine Specific Gravity   1.020  


 


Urine Protein


  


  


  30 mg/dL


(NEG-TRACE) 


 


 


Urine Glucose (UA)


  


  


  Negative mg/dL


(NEG) 


 


 


Urine Ketones (Stick)


  


  


  Negative mg/dL


(NEG) 


 


 


Urine Blood   Negative (NEG)  


 


Urine Nitrite   Negative (NEG)  


 


Urine Bilirubin   Negative (NEG)  


 


Urine Urobilinogen Dipstick


  


  


  1.0 mg/dL (0.2


mg/dL) 


 


 


Urine Leukocyte Esterase   Trace (NEG)  


 


Urine RBC   0 /HPF (0-2)  


 


Urine WBC   Occ /HPF (0-4)  


 


Urine Squamous Epithelial


Cells 


  


  Occ /LPF 


  


 


 


Urine Amorphous Sediment   Present /HPF  


 


Urine Bacteria


  


  


  Few /HPF


(0-FEW) 


 


 


Urine Hyaline Casts


  


  


  Occasional


/HPF 


 


 


Urine Mucus   Mod /LPF  


 


Nasal Screen MRSA (PCR)


  


  


  


  Negative


(Negative)


 


Test


  5/16/17


07:15 5/17/17


05:15 


  


 


 


White Blood Count


  4.2 x10^3/uL


(4.0-11.0) 8.7 x10^3/uL


(4.0-11.0) 


  


 


 


Red Blood Count


  2.93 x10^6/uL


(3.50-5.40) 2.87 x10^6/uL


(3.50-5.40) 


  


 


 


Hemoglobin


  8.4 g/dL


(12.0-15.5) 8.1 g/dL


(12.0-15.5) 


  


 


 


Hematocrit


  25.7 %


(36.0-47.0) 25.1 %


(36.0-47.0) 


  


 


 


Mean Corpuscular Volume 88 fL ()  88 fL ()   


 


Mean Corpuscular Hemoglobin 29 pg (25-35)  28 pg (25-35)   


 


Mean Corpuscular Hemoglobin


Concent 33 g/dL


(31-37) 32 g/dL


(31-37) 


  


 


 


Red Cell Distribution Width


  16.3 %


(11.5-14.5) 16.0 %


(11.5-14.5) 


  


 


 


Platelet Count


  125 x10^3/uL


(140-400) 122 x10^3/uL


(140-400) 


  


 


 


Neutrophils (%) (Auto) 85 % (31-73)  91 % (31-73)   


 


Lymphocytes (%) (Auto) 11 % (24-48)  5 % (24-48)   


 


Monocytes (%) (Auto) 3 % (0-9)  3 % (0-9)   


 


Eosinophils (%) (Auto) 0 % (0-3)  0 % (0-3)   


 


Basophils (%) (Auto) 0 % (0-3)  0 % (0-3)   


 


Neutrophils # (Auto)


  3.6 x10^3uL


(1.8-7.7) 7.9 x10^3uL


(1.8-7.7) 


  


 


 


Lymphocytes # (Auto)


  0.5 x10^3/uL


(1.0-4.8) 0.5 x10^3/uL


(1.0-4.8) 


  


 


 


Monocytes # (Auto)


  0.1 x10^3/uL


(0.0-1.1) 0.3 x10^3/uL


(0.0-1.1) 


  


 


 


Eosinophils # (Auto)


  0.0 x10^3/uL


(0.0-0.7) 0.0 x10^3/uL


(0.0-0.7) 


  


 


 


Basophils # (Auto)


  0.0 x10^3/uL


(0.0-0.2) 0.0 x10^3/uL


(0.0-0.2) 


  


 


 


Segmented Neutrophils % 88 % (35-66)    


 


Band Neutrophils % 4 % (0-9)    


 


Lymphocytes % 7 % (24-48)    


 


Monocytes % 1 % (0-10)    


 


Platelet Estimate


  Decreased


(ADEQUATE) 


  


  


 


 


Sodium Level


  137 mmol/L


(136-145) 137 mmol/L


(136-145) 


  


 


 


Potassium Level


  4.2 mmol/L


(3.5-5.1) 5.0 mmol/L


(3.5-5.1) 


  


 


 


Chloride Level


  99 mmol/L


() 100 mmol/L


() 


  


 


 


Carbon Dioxide Level


  32 mmol/L


(21-32) 38 mmol/L


(21-32) 


  


 


 


Anion Gap 6 (6-14)   (6-14)   


 


Blood Urea Nitrogen


  16 mg/dL


(7-20) 19 mg/dL


(7-20) 


  


 


 


Creatinine


  1.1 mg/dL


(0.6-1.0) 1.0 mg/dL


(0.6-1.0) 


  


 


 


Estimated GFR


(Cockcroft-Gault) 47.3 


  52.8 


  


  


 


 


Glucose Level


  185 mg/dL


(70-99) 117 mg/dL


(70-99) 


  


 


 


Calcium Level


  8.6 mg/dL


(8.5-10.1) 8.8 mg/dL


(8.5-10.1) 


  


 








Laboratory Tests








Test


  5/17/17


05:15


 


White Blood Count


  8.7 x10^3/uL


(4.0-11.0)


 


Red Blood Count


  2.87 x10^6/uL


(3.50-5.40)


 


Hemoglobin


  8.1 g/dL


(12.0-15.5)


 


Hematocrit


  25.1 %


(36.0-47.0)


 


Mean Corpuscular Volume 88 fL () 


 


Mean Corpuscular Hemoglobin 28 pg (25-35) 


 


Mean Corpuscular Hemoglobin


Concent 32 g/dL


(31-37)


 


Red Cell Distribution Width


  16.0 %


(11.5-14.5)


 


Platelet Count


  122 x10^3/uL


(140-400)


 


Neutrophils (%) (Auto) 91 % (31-73) 


 


Lymphocytes (%) (Auto) 5 % (24-48) 


 


Monocytes (%) (Auto) 3 % (0-9) 


 


Eosinophils (%) (Auto) 0 % (0-3) 


 


Basophils (%) (Auto) 0 % (0-3) 


 


Neutrophils # (Auto)


  7.9 x10^3uL


(1.8-7.7)


 


Lymphocytes # (Auto)


  0.5 x10^3/uL


(1.0-4.8)


 


Monocytes # (Auto)


  0.3 x10^3/uL


(0.0-1.1)


 


Eosinophils # (Auto)


  0.0 x10^3/uL


(0.0-0.7)


 


Basophils # (Auto)


  0.0 x10^3/uL


(0.0-0.2)


 


Sodium Level


  137 mmol/L


(136-145)


 


Potassium Level


  5.0 mmol/L


(3.5-5.1)


 


Chloride Level


  100 mmol/L


()


 


Carbon Dioxide Level


  38 mmol/L


(21-32)


 


Anion Gap  (6-14) 


 


Blood Urea Nitrogen


  19 mg/dL


(7-20)


 


Creatinine


  1.0 mg/dL


(0.6-1.0)


 


Estimated GFR


(Cockcroft-Gault) 52.8 


 


 


Glucose Level


  117 mg/dL


(70-99)


 


Calcium Level


  8.8 mg/dL


(8.5-10.1)








Medications





Active Scripts








 Medications  Dose


 Route/Sig


 Max Daily Dose Days Date Category


 


 Promethazine Hcl


 25 Mg Supp.rect  25 Mg


 RC Q6H PRN


    3/10/17 Rx


 


 Zofran Odt


  (Ondansetron) 4


 Mg Tab.rapdis  1 Tab


 SL Q8HRS


    3/5/17 Rx


 


 Prednisone 50 Mg


 Tablet  50 Mg


 PO DAILY


    3/3/17 Rx


 


 Duoneb 0.5-3(2.5)


 Mg/3 Ml


  (Albuterol/Ipratropium)


 3 Ml Ampul.neb  3 Ml


 NEB QID


    3/1/17 Reported


 


 Vitamin B-12


  (Cyanocobalamin


  (Vitamin B-12))


 1,000 Mcg Tablet  1 Tab


 PO DAILY


    1/19/17 Rx


 


 Senna


  (Sennosides) 8.6


 Mg Tablet  8.6 Mg


 PO DAILY


    9/14/16 Rx


 


 Docusate Sodium


 100 Mg Capsule  1 Cap


 PO DAILY


    9/14/16 Rx


 


 Trazodone Hcl 100


 Mg Tablet  1 Tab


 PO QHS


    9/1/16 Reported


 


 Vitamin D


  (Cholecalciferol


  (Vitamin D3))


 1,000 Unit Capsule  1 Cap


 PO DAILY


    9/1/16 Reported


 


 Percocet 


 Mg Tablet


  (Oxycodone/Acetaminophen)


 1 Each Tablet  1 Tab


 PO TID PRN


    6/8/16 Rx


 


 Cranberry


  (Cranberry Fruit)


 400 Mg Tablet  4,200 Mg


 PO DAILY


    6/1/16 Reported


 


 Levothyroxine


 Sodium 88 Mcg


 Tablet  1 Tab


 PO DAILY


    5/31/16 Reported


 


 Alprazolam 0.25


 Mg Tablet  1 Tab


 PO PRN TID PRN


    5/31/16 Reported


 


 Digoxin 125 Mcg


 Tablet  1 Tab


 PO DAILY


    6/1/15 Reported


 


 Zofran Odt


  (Ondansetron) 4


 Mg Tab.rapdis  1 Tab


 SL Q8HRS PRN


    6/1/15 Reported


 


 Escitalopram


 Oxalate 10 Mg


 Tablet  20 Mg


 PO DAILY


    6/8/14 Reported











Impression


.


1.  Dyspnea secondary to acute exacerbation of chronic obstructive pulmonary


disease and possible mild diastolic heart failure.


2.  History of lung cancer status post radiation to the right lower lobe with


the remission.


3.  Underlying severe chronic obstructive pulmonary disease with acute on 

chronic


respiratory failure due to AECOPD, requiring BIPAP on admission





Plan


.





1.   continue nasal cannula, would use BiPAP p.r.n.


2.   use p.r.n. Lasix.


3.  Continue nebulizer treatments.


4.  Continue IV steroids with taper.


5.  Antibiotics can be deescalated soon.


6.  home in 2 4hr











EDDIE ANDRE MD May 17, 2017 12:09

## 2017-05-17 NOTE — PDOC
PROGRESS NOTES


Subjective


Subjective


moved out of ICU, feeling better





Objective


Objective





Vital Signs








  Date Time  Temp Pulse Resp B/P (MAP) Pulse Ox O2 Delivery O2 Flow Rate FiO2


 


5/17/17 08:24  92  143/77    


 


5/17/17 08:00      Nasal Cannula 5.0 


 


5/17/17 07:31     94   


 


5/17/17 07:00 98.0  24     





 98.0       














Intake and Output 


 


 5/17/17





 07:00


 


Intake Total 1495 ml


 


Output Total 225 ml


 


Balance 1270 ml


 


 


 


Intake Oral 1370 ml


 


IV Total 125 ml


 


Output Urine Total 225 ml


 


# Voids 9


 


# Bowel Movements 1











Physical Exam


Abdomen:  Normal bowel sounds, Soft


Heart:  Regular rate, Normal S1


Extremities:  No cyanosis


General:  Alert


HEENT:  Atraumatic


Lungs:  Other (dec breath sounds)


MUSCULOSKELETAL:  No swelling


Neck:  Supple


Neuro:  Normal speech


Psych/Mental Status:  Mood NL


Skin:  No breakdown





Diagnosis


Problem List


Problems


Medical Problems:


(1) Acute on chronic respiratory failure


Status: Acute  





(2) Normocytic anemia


Status: Acute  





(3) Severe protein-calorie malnutrition


Status: Acute  











Assessment


Assessment


Problems


Medical Problems:


(1) Acute on chronic respiratory failure


Status: Acute  





(2) Normocytic anemia


Status: Acute  





(3) Severe protein-calorie malnutrition


Status: Acute  





FINAL IMPRESSION:


1.  Chronic obstructive pulmonary disease with acute exacerbation.


2.  Chronic chronic obstructive pulmonary disease.


3.  History of lung cancer, status post radiation treatment 5 years ago.


4.  Anxiety.


5.  Depression.


6.  Hypothyroidism.


7.  Osteoporosis, compression fractures of the spine, secondary to long term


steroid use.  The patient is on calcium and vitamin D and Boniva, but she does


not take Boniva.


8.  Thrombocytopenia.


9.  Anemia of chronic disease.





PLAN:  


Moved out of icu.


on oxygen , standby BIPAP.


platelets improving to 125,000.


iv solumedrol.


pt/ot.





At this time was admit to the hospital, IV Solu-Medrol, oxygen and


breathing treatment, BiPAP p.r.n.  The patient was consulted by Pulmonology. 


Hold off on the Lovenox secondary to low platelets.  If continues to go low, I


will get Hematology and the patient wishes DNR, does not want to be intubated.


Problems:  





Plan


Plan of Care


Problems


Medical Problems:


(1) Acute on chronic respiratory failure


Status: Acute  





(2) Normocytic anemia


Status: Acute  





(3) Severe protein-calorie malnutrition


Status: Acute  








Comment


Review of Relevant


I have reviewed the following items shan (where applicable) has been applied.


Labs





Laboratory Tests








Test


  5/17/17


05:15


 


White Blood Count


  8.7 x10^3/uL


(4.0-11.0)


 


Red Blood Count


  2.87 x10^6/uL


(3.50-5.40)


 


Hemoglobin


  8.1 g/dL


(12.0-15.5)


 


Hematocrit


  25.1 %


(36.0-47.0)


 


Mean Corpuscular Volume 88 fL () 


 


Mean Corpuscular Hemoglobin 28 pg (25-35) 


 


Mean Corpuscular Hemoglobin


Concent 32 g/dL


(31-37)


 


Red Cell Distribution Width


  16.0 %


(11.5-14.5)


 


Platelet Count


  122 x10^3/uL


(140-400)


 


Neutrophils (%) (Auto) 91 % (31-73) 


 


Lymphocytes (%) (Auto) 5 % (24-48) 


 


Monocytes (%) (Auto) 3 % (0-9) 


 


Eosinophils (%) (Auto) 0 % (0-3) 


 


Basophils (%) (Auto) 0 % (0-3) 


 


Neutrophils # (Auto)


  7.9 x10^3uL


(1.8-7.7)


 


Lymphocytes # (Auto)


  0.5 x10^3/uL


(1.0-4.8)


 


Monocytes # (Auto)


  0.3 x10^3/uL


(0.0-1.1)


 


Eosinophils # (Auto)


  0.0 x10^3/uL


(0.0-0.7)


 


Basophils # (Auto)


  0.0 x10^3/uL


(0.0-0.2)


 


Sodium Level


  137 mmol/L


(136-145)


 


Potassium Level


  5.0 mmol/L


(3.5-5.1)


 


Chloride Level


  100 mmol/L


()


 


Carbon Dioxide Level


  38 mmol/L


(21-32)


 


Anion Gap  (6-14) 


 


Blood Urea Nitrogen


  19 mg/dL


(7-20)


 


Creatinine


  1.0 mg/dL


(0.6-1.0)


 


Estimated GFR


(Cockcroft-Gault) 52.8 


 


 


Glucose Level


  117 mg/dL


(70-99)


 


Calcium Level


  8.8 mg/dL


(8.5-10.1)








Microbiology


5/15/17 Blood Culture - Preliminary, Resulted


          NO GROWTH AFTER 1 DAY


5/15/17 Urine Culture - Preliminary, Resulted


          


5/15/17 Urine Culture Result 1 (RENAE) - Preliminary, Resulted


Medications





Current Medications


Albuterol/ Ipratropium (Duoneb) 3 ml RTQID NEB  Last administered on 5/17/17at 

07:29;  Start 5/16/17 at 12:00


Levothyroxine Sodium (Synthroid) 88 mcg DAILY06 PO  Last administered on 5/17/ 17at 06:12;  Start 5/16/17 at 15:15


Ondansetron HCl (Zofran Odt) 4 mg PRN Q8HRS  PRN PO N/V 1ST CHOICE;  Start 5/16/ 17 at 14:00


Trazodone HCl (Desyrel) 100 mg QHS PO  Last administered on 5/16/17at 20:34;  

Start 5/16/17 at 21:00


Vitals/I & O





Vital Sign - Last 24 Hours








 5/16/17 5/16/17 5/16/17 5/16/17





 11:51 15:00 15:15 16:57


 


Temp  97.4  





  97.4  


 


Pulse  80  


 


Resp  30  


 


B/P (MAP)  141/73 (95)  


 


Pulse Ox 94 94 97 97


 


O2 Delivery Nasal Cannula BiPAP/CPAP BiPAP/CPAP BiPAP/CPAP


 


O2 Flow Rate 5.0   


 


    





    





 5/16/17 5/16/17 5/16/17 5/16/17





 20:00 20:00 20:35 23:32


 


Temp  97.9  





  97.9  


 


Pulse  97  


 


Resp  18  


 


B/P (MAP)  140/71 (94)  


 


Pulse Ox  97 96 


 


O2 Delivery Bi-pap Nasal Cannula BiPAP/CPAP BiPAP/CPAP


 


O2 Flow Rate  5.0  


 


    





    





 5/16/17 5/17/17 5/17/17 5/17/17





 23:36 03:00 07:00 07:31


 


Temp 98.0 98.3 98.0 





 98.0 98.3 98.0 


 


Pulse 99 91 92 


 


Resp 20 18 24 


 


B/P (MAP) 150/80 (103) 129/77 (94) 143/77 (99) 


 


Pulse Ox 98 91 92 94


 


O2 Delivery Nasal Cannula Nasal Cannula Nasal Cannula Nasal Cannula


 


O2 Flow Rate 5.0 5.0 5.0 5.0


 


    





    





 5/17/17 5/17/17  





 08:00 08:24  


 


Pulse  92  


 


B/P (MAP)  143/77  


 


O2 Delivery Nasal Cannula   


 


O2 Flow Rate 5.0   














Intake and Output   


 


 5/16/17 5/16/17 5/17/17





 15:00 23:00 07:00


 


Intake Total 550 ml 495 ml 450 ml


 


Output Total 225 ml  


 


Balance 325 ml 495 ml 450 ml

















SUMMER TABOR MD May 17, 2017 10:22

## 2017-05-18 VITALS — SYSTOLIC BLOOD PRESSURE: 136 MMHG | DIASTOLIC BLOOD PRESSURE: 66 MMHG

## 2017-05-18 VITALS — DIASTOLIC BLOOD PRESSURE: 67 MMHG | SYSTOLIC BLOOD PRESSURE: 125 MMHG

## 2017-05-18 VITALS — SYSTOLIC BLOOD PRESSURE: 135 MMHG | DIASTOLIC BLOOD PRESSURE: 70 MMHG

## 2017-05-18 VITALS — DIASTOLIC BLOOD PRESSURE: 78 MMHG | SYSTOLIC BLOOD PRESSURE: 154 MMHG

## 2017-05-18 VITALS — SYSTOLIC BLOOD PRESSURE: 140 MMHG | DIASTOLIC BLOOD PRESSURE: 66 MMHG

## 2017-05-18 VITALS — DIASTOLIC BLOOD PRESSURE: 75 MMHG | SYSTOLIC BLOOD PRESSURE: 147 MMHG

## 2017-05-18 RX ADMIN — CEFPODOXIME PROXETIL SCH MG: 100 TABLET, FILM COATED ORAL at 21:49

## 2017-05-18 RX ADMIN — SENNOSIDES A AND B SCH MG: 8.6 TABLET, FILM COATED ORAL at 08:11

## 2017-05-18 RX ADMIN — IPRATROPIUM BROMIDE AND ALBUTEROL SULFATE SCH ML: .5; 3 SOLUTION RESPIRATORY (INHALATION) at 14:54

## 2017-05-18 RX ADMIN — METHYLPREDNISOLONE SODIUM SUCCINATE SCH MG: 125 INJECTION, POWDER, FOR SOLUTION INTRAMUSCULAR; INTRAVENOUS at 21:50

## 2017-05-18 RX ADMIN — METHYLPREDNISOLONE SODIUM SUCCINATE SCH MG: 125 INJECTION, POWDER, FOR SOLUTION INTRAMUSCULAR; INTRAVENOUS at 05:43

## 2017-05-18 RX ADMIN — IPRATROPIUM BROMIDE AND ALBUTEROL SULFATE SCH ML: .5; 3 SOLUTION RESPIRATORY (INHALATION) at 20:03

## 2017-05-18 RX ADMIN — TRAZODONE HYDROCHLORIDE SCH MG: 100 TABLET ORAL at 21:50

## 2017-05-18 RX ADMIN — IPRATROPIUM BROMIDE AND ALBUTEROL SULFATE SCH ML: .5; 3 SOLUTION RESPIRATORY (INHALATION) at 06:59

## 2017-05-18 RX ADMIN — DOCUSATE SODIUM SCH MG: 100 CAPSULE, LIQUID FILLED ORAL at 08:12

## 2017-05-18 RX ADMIN — METHYLPREDNISOLONE SODIUM SUCCINATE SCH MG: 125 INJECTION, POWDER, FOR SOLUTION INTRAMUSCULAR; INTRAVENOUS at 14:09

## 2017-05-18 RX ADMIN — DIGOXIN SCH MCG: 125 TABLET ORAL at 08:12

## 2017-05-18 RX ADMIN — IPRATROPIUM BROMIDE AND ALBUTEROL SULFATE SCH ML: .5; 3 SOLUTION RESPIRATORY (INHALATION) at 11:21

## 2017-05-18 RX ADMIN — CEFPODOXIME PROXETIL SCH MG: 100 TABLET, FILM COATED ORAL at 12:30

## 2017-05-18 RX ADMIN — LEVOTHYROXINE SODIUM SCH MCG: 88 TABLET ORAL at 05:43

## 2017-05-18 RX ADMIN — CYANOCOBALAMIN TAB 1000 MCG SCH MCG: 1000 TAB at 08:11

## 2017-05-18 RX ADMIN — ESCITALOPRAM OXALATE SCH MG: 10 TABLET ORAL at 08:11

## 2017-05-18 NOTE — PDOC
PROGRESS NOTES


Subjective


Subjective


feeling sob, on BIPAP now





Objective


Objective





Vital Signs








  Date Time  Temp Pulse Resp B/P (MAP) Pulse Ox O2 Delivery O2 Flow Rate FiO2


 


5/18/17 08:12  82  154/78    


 


5/18/17 08:00      Nasal Cannula 5.0 


 


5/18/17 07:53 97.9  24  90   





 97.9       














Intake and Output 


 


 5/18/17





 07:00


 


Intake Total 460 ml


 


Balance 460 ml


 


 


 


Intake Oral 460 ml


 


# Voids 2











Physical Exam


Abdomen:  Normal bowel sounds, Soft


Heart:  Regular rate, Normal S1


Extremities:  No cyanosis


General:  Alert


HEENT:  Atraumatic


Lungs:  Other (dec breath sounds)


MUSCULOSKELETAL:  No swelling


Neck:  Supple


Neuro:  Normal speech


Psych/Mental Status:  Mood NL


Skin:  No breakdown





Diagnosis


Problem List


Problems


Medical Problems:


(1) Acute on chronic respiratory failure


Status: Acute  





(2) Normocytic anemia


Status: Acute  





(3) Severe protein-calorie malnutrition


Status: Acute  











Assessment


Assessment


Problems


Medical Problems:


(1) Acute on chronic respiratory failure


Status: Acute  





(2) Normocytic anemia


Status: Acute  





(3) Severe protein-calorie malnutrition


Status: Acute  





FINAL IMPRESSION:


1.  Chronic obstructive pulmonary disease with acute exacerbation.


2.  Chronic chronic obstructive pulmonary disease.


3.  History of lung cancer, status post radiation treatment 5 years ago.


4.  Anxiety.


5.  Depression.


6.  Hypothyroidism.


7.  Osteoporosis, compression fractures of the spine, secondary to long term


steroid use.  The patient is on calcium and vitamin D and Boniva, but she does


not take Boniva.


8.  Thrombocytopenia.


9.  Anemia of chronic disease.





PLAN: BIPAP today.


cxr today.


want  to go home tomorrow,(graduation in family )


Moved out of icu.2 days ago


on oxygen , standby BIPAP.


platelets improving to 125,000.


iv solumedrol change to po.


pt/ot.





At this time was admit to the hospital, IV Solu-Medrol, oxygen and


breathing treatment, BiPAP p.r.n.  The patient was consulted by Pulmonology. 


Hold off on the Lovenox secondary to low platelets.  If continues to go low, I


will get Hematology and the patient wishes DNR, does not want to be intubated.


Problems:  





Plan


Plan of Care


Problems


Medical Problems:


(1) Acute on chronic respiratory failure


Status: Acute  





(2) Normocytic anemia


Status: Acute  





(3) Severe protein-calorie malnutrition


Status: Acute  








Comment


Review of Relevant


I have reviewed the following items shan (where applicable) has been applied.


Labs





Microbiology


5/15/17 Blood Culture - Preliminary, Resulted


          NO GROWTH AFTER 2 DAYS


5/15/17 Urine Culture - Final, Complete


          


5/15/17 Urine Culture Result 1 (RENAE) - Final, Complete


Medications





Current Medications


Calcium Carbonate/ Glycine (Tums) 500 mg PRN AFTMEALHC  PRN PO INDIGESTION Last 

administered on 5/17/17at 23:16;  Start 5/17/17 at 23:00


Vitals/I & O





Vital Sign - Last 24 Hours








 5/17/17 5/17/17 5/17/17 5/17/17





 11:00 11:37 15:25 16:05


 


Temp 98.1  98.4 





 98.1  98.4 


 


Pulse 81  84 


 


Resp 24  20 


 


B/P (MAP) 133/70 (91)  110/65 (80) 


 


Pulse Ox 92   


 


O2 Delivery Nasal Cannula Nasal Cannula Nasal Cannula Nasal Cannula


 


O2 Flow Rate 5.0 5.0 5.0 5.0


 


    





    





 5/17/17 5/17/17 5/17/17 5/17/17





 19:40 19:58 20:00 23:21


 


Temp  97.9  





  97.9  


 


Pulse  96  


 


Resp  30  24


 


B/P (MAP)  126/66 (86)  


 


Pulse Ox 93 90  92


 


O2 Delivery Nasal Cannula Nasal Cannula Nasal Cannula Nasal Cannula


 


O2 Flow Rate 5.0 5.0 5.0 5.0


 


    





    





 5/18/17 5/18/17 5/18/17 5/18/17





 04:36 06:59 07:53 08:00


 


Temp 98.1  97.9 





 98.1  97.9 


 


Pulse 92  82 


 


Resp 24  24 


 


B/P (MAP) 136/66 (89)  154/78 (103) 


 


Pulse Ox 90 96 90 


 


O2 Delivery Nasal Cannula Nasal Cannula Nasal Cannula Nasal Cannula


 


O2 Flow Rate 5.0 5.0 5.0 5.0


 


    





    





 5/18/17   





 08:12   


 


Pulse 82   


 


B/P (MAP) 154/78   














Intake and Output   


 


 5/17/17 5/17/17 5/18/17





 15:00 23:00 07:00


 


Intake Total  120 ml 340 ml


 


Balance  120 ml 340 ml

















SUMMER TABOR MD May 18, 2017 10:25

## 2017-05-18 NOTE — PDOC
PULMONARY PROGRESS NOTES


Subjective


feels better


Vitals





Vital Signs








  Date Time  Temp Pulse Resp B/P (MAP) Pulse Ox O2 Delivery O2 Flow Rate FiO2


 


5/18/17 08:12  82  154/78    


 


5/18/17 08:00      Nasal Cannula 5.0 


 


5/18/17 07:53 97.9  24  90   





 97.9       








General:  Alert, No acute distress


HEENT:  Other


Lungs:  Other (decrease bs)


Cardiovascular:  S1, S2


Abdomen:  Soft, Non-tender


Extremities:  No Edema


Labs





Laboratory Tests








Test


  5/17/17


05:15


 


White Blood Count


  8.7 x10^3/uL


(4.0-11.0)


 


Red Blood Count


  2.87 x10^6/uL


(3.50-5.40)


 


Hemoglobin


  8.1 g/dL


(12.0-15.5)


 


Hematocrit


  25.1 %


(36.0-47.0)


 


Mean Corpuscular Volume 88 fL () 


 


Mean Corpuscular Hemoglobin 28 pg (25-35) 


 


Mean Corpuscular Hemoglobin


Concent 32 g/dL


(31-37)


 


Red Cell Distribution Width


  16.0 %


(11.5-14.5)


 


Platelet Count


  122 x10^3/uL


(140-400)


 


Neutrophils (%) (Auto) 91 % (31-73) 


 


Lymphocytes (%) (Auto) 5 % (24-48) 


 


Monocytes (%) (Auto) 3 % (0-9) 


 


Eosinophils (%) (Auto) 0 % (0-3) 


 


Basophils (%) (Auto) 0 % (0-3) 


 


Neutrophils # (Auto)


  7.9 x10^3uL


(1.8-7.7)


 


Lymphocytes # (Auto)


  0.5 x10^3/uL


(1.0-4.8)


 


Monocytes # (Auto)


  0.3 x10^3/uL


(0.0-1.1)


 


Eosinophils # (Auto)


  0.0 x10^3/uL


(0.0-0.7)


 


Basophils # (Auto)


  0.0 x10^3/uL


(0.0-0.2)


 


Sodium Level


  137 mmol/L


(136-145)


 


Potassium Level


  5.0 mmol/L


(3.5-5.1)


 


Chloride Level


  100 mmol/L


()


 


Carbon Dioxide Level


  38 mmol/L


(21-32)


 


Anion Gap  (6-14) 


 


Blood Urea Nitrogen


  19 mg/dL


(7-20)


 


Creatinine


  1.0 mg/dL


(0.6-1.0)


 


Estimated GFR


(Cockcroft-Gault) 52.8 


 


 


Glucose Level


  117 mg/dL


(70-99)


 


Calcium Level


  8.8 mg/dL


(8.5-10.1)








Medications





Active Scripts








 Medications  Dose


 Route/Sig


 Max Daily Dose Days Date Category


 


 Promethazine Hcl


 25 Mg Supp.rect  25 Mg


 RC Q6H PRN


    3/10/17 Rx


 


 Zofran Odt


  (Ondansetron) 4


 Mg Tab.rapdis  1 Tab


 SL Q8HRS


    3/5/17 Rx


 


 Prednisone 50 Mg


 Tablet  50 Mg


 PO DAILY


    3/3/17 Rx


 


 Duoneb 0.5-3(2.5)


 Mg/3 Ml


  (Albuterol/Ipratropium)


 3 Ml Ampul.neb  3 Ml


 NEB QID


    3/1/17 Reported


 


 Vitamin B-12


  (Cyanocobalamin


  (Vitamin B-12))


 1,000 Mcg Tablet  1 Tab


 PO DAILY


    1/19/17 Rx


 


 Senna


  (Sennosides) 8.6


 Mg Tablet  8.6 Mg


 PO DAILY


    9/14/16 Rx


 


 Docusate Sodium


 100 Mg Capsule  1 Cap


 PO DAILY


    9/14/16 Rx


 


 Trazodone Hcl 100


 Mg Tablet  1 Tab


 PO QHS


    9/1/16 Reported


 


 Vitamin D


  (Cholecalciferol


  (Vitamin D3))


 1,000 Unit Capsule  1 Cap


 PO DAILY


    9/1/16 Reported


 


 Percocet 


 Mg Tablet


  (Oxycodone/Acetaminophen)


 1 Each Tablet  1 Tab


 PO TID PRN


    6/8/16 Rx


 


 Cranberry


  (Cranberry Fruit)


 400 Mg Tablet  4,200 Mg


 PO DAILY


    6/1/16 Reported


 


 Levothyroxine


 Sodium 88 Mcg


 Tablet  1 Tab


 PO DAILY


    5/31/16 Reported


 


 Alprazolam 0.25


 Mg Tablet  1 Tab


 PO PRN TID PRN


    5/31/16 Reported


 


 Digoxin 125 Mcg


 Tablet  1 Tab


 PO DAILY


    6/1/15 Reported


 


 Zofran Odt


  (Ondansetron) 4


 Mg Tab.rapdis  1 Tab


 SL Q8HRS PRN


    6/1/15 Reported


 


 Escitalopram


 Oxalate 10 Mg


 Tablet  20 Mg


 PO DAILY


    6/8/14 Reported











Impression


.


1.  Dyspnea secondary to acute exacerbation of chronic obstructive pulmonary


disease and possible mild diastolic heart failure.


2.  History of lung cancer status post radiation to the right lower lobe with


the remission.


3.  Underlying severe chronic obstructive pulmonary disease with acute on 

chronic


respiratory failure due to AECOPD, requiring BIPAP on admission





Plan


.





1.   continue nasal cannula, would use BiPAP p.r.n.


2.   use p.r.n. Lasix.


3.  Continue nebulizer treatments.


4.  Continue IV steroids with taper.


5.  Antibiotics can be deescalated soon.


6.  home in 24hr











EDDIE ANDRE MD May 18, 2017 10:04

## 2017-05-19 VITALS — DIASTOLIC BLOOD PRESSURE: 60 MMHG | SYSTOLIC BLOOD PRESSURE: 127 MMHG

## 2017-05-19 VITALS — SYSTOLIC BLOOD PRESSURE: 132 MMHG | DIASTOLIC BLOOD PRESSURE: 71 MMHG

## 2017-05-19 VITALS — SYSTOLIC BLOOD PRESSURE: 126 MMHG | DIASTOLIC BLOOD PRESSURE: 72 MMHG

## 2017-05-19 RX ADMIN — CYANOCOBALAMIN TAB 1000 MCG SCH MCG: 1000 TAB at 10:21

## 2017-05-19 RX ADMIN — IPRATROPIUM BROMIDE AND ALBUTEROL SULFATE SCH ML: .5; 3 SOLUTION RESPIRATORY (INHALATION) at 08:52

## 2017-05-19 RX ADMIN — IPRATROPIUM BROMIDE AND ALBUTEROL SULFATE SCH ML: .5; 3 SOLUTION RESPIRATORY (INHALATION) at 12:06

## 2017-05-19 RX ADMIN — CEFPODOXIME PROXETIL SCH MG: 100 TABLET, FILM COATED ORAL at 10:22

## 2017-05-19 RX ADMIN — DOCUSATE SODIUM SCH MG: 100 CAPSULE, LIQUID FILLED ORAL at 10:22

## 2017-05-19 RX ADMIN — ESCITALOPRAM OXALATE SCH MG: 10 TABLET ORAL at 10:22

## 2017-05-19 RX ADMIN — DIGOXIN SCH MCG: 125 TABLET ORAL at 10:22

## 2017-05-19 RX ADMIN — LEVOTHYROXINE SODIUM SCH MCG: 88 TABLET ORAL at 06:32

## 2017-05-19 RX ADMIN — SENNOSIDES A AND B SCH MG: 8.6 TABLET, FILM COATED ORAL at 10:22

## 2017-05-19 NOTE — PDOC
PULMONARY PROGRESS NOTES


Subjective


feels better


Vitals





Vital Signs








  Date Time  Temp Pulse Resp B/P (MAP) Pulse Ox O2 Delivery O2 Flow Rate FiO2


 


5/19/17 08:54     90 Nasal Cannula 6.0 


 


5/19/17 07:00 97.7 71 20 126/72 (90)    





 97.7       








General:  Alert, No acute distress


HEENT:  Other


Lungs:  Other (decrease bs)


Cardiovascular:  S1, S2


Abdomen:  Soft, Non-tender


Extremities:  No Edema


Medications





Active Scripts








 Medications  Dose


 Route/Sig


 Max Daily Dose Days Date Category


 


 Promethazine Hcl


 25 Mg Supp.rect  25 Mg


 RC Q6H PRN


    3/10/17 Rx


 


 Zofran Odt


  (Ondansetron) 4


 Mg Tab.rapdis  1 Tab


 SL Q8HRS


    3/5/17 Rx


 


 Prednisone 50 Mg


 Tablet  50 Mg


 PO DAILY


    3/3/17 Rx


 


 Duoneb 0.5-3(2.5)


 Mg/3 Ml


  (Albuterol/Ipratropium)


 3 Ml Ampul.neb  3 Ml


 NEB QID


    3/1/17 Reported


 


 Vitamin B-12


  (Cyanocobalamin


  (Vitamin B-12))


 1,000 Mcg Tablet  1 Tab


 PO DAILY


    1/19/17 Rx


 


 Senna


  (Sennosides) 8.6


 Mg Tablet  8.6 Mg


 PO DAILY


    9/14/16 Rx


 


 Docusate Sodium


 100 Mg Capsule  1 Cap


 PO DAILY


    9/14/16 Rx


 


 Trazodone Hcl 100


 Mg Tablet  1 Tab


 PO QHS


    9/1/16 Reported


 


 Vitamin D


  (Cholecalciferol


  (Vitamin D3))


 1,000 Unit Capsule  1 Cap


 PO DAILY


    9/1/16 Reported


 


 Percocet 


 Mg Tablet


  (Oxycodone/Acetaminophen)


 1 Each Tablet  1 Tab


 PO TID PRN


    6/8/16 Rx


 


 Cranberry


  (Cranberry Fruit)


 400 Mg Tablet  4,200 Mg


 PO DAILY


    6/1/16 Reported


 


 Levothyroxine


 Sodium 88 Mcg


 Tablet  1 Tab


 PO DAILY


    5/31/16 Reported


 


 Alprazolam 0.25


 Mg Tablet  1 Tab


 PO PRN TID PRN


    5/31/16 Reported


 


 Digoxin 125 Mcg


 Tablet  1 Tab


 PO DAILY


    6/1/15 Reported


 


 Zofran Odt


  (Ondansetron) 4


 Mg Tab.rapdis  1 Tab


 SL Q8HRS PRN


    6/1/15 Reported


 


 Escitalopram


 Oxalate 10 Mg


 Tablet  20 Mg


 PO DAILY


    6/8/14 Reported











Impression


.


1.  Dyspnea secondary to acute exacerbation of chronic obstructive pulmonary


disease and possible mild diastolic heart failure.


2.  History of lung cancer status post radiation to the right lower lobe with


the remission.


3.  Underlying severe chronic obstructive pulmonary disease with acute on 

chronic


respiratory failure due to AECOPD, requiring BIPAP on admission





Plan


.





1.   continue nasal cannula, would use BiPAP p.r.n.


2.   use p.r.n. Lasix.


3.  Continue nebulizer treatments.


4.  Continue IV steroids with taper.


5.  Antibiotics , change to PO


6.  home today











EDDIE ANDRE MD May 19, 2017 10:00

## 2017-05-19 NOTE — DS
DATE OF DISCHARGE:  05/19/2017



REASON FOR ADMISSION TO THE HOSPITAL:  COPD with acute exacerbation, acute

respiratory failure, respiratory alkalosis. 



HOSPITAL COURSE:  This is an 84-year-old female.  The patient has a history of

chronic COPD and hypertension, lung cancer.  She was having shortness of breath.

 She was on prednisone outpatient without much improvement.  She was admitted to

the ICU, was placed on BiPAP and seen by Pulmonology.  The patient was given IV

Solu-Medrol, IV antibiotics, BiPAP and also breathing treatments.  She did

improve next day, was moved out of the ICU and she was tapered off the

prednisone, antibiotics.  She was discharged.



FINAL DIAGNOSES:

1.  Acute on chronic, chronic obstructive pulmonary disease exacerbation.

2.  Respiratory alkalosis.

3.  History of lung cancer, radiation treatment 5 years ago.

4.  Hypothyroidism.

5.  Chronic hypoxia, on home oxygen.



DISPOSITION:  Home.  She wishes DNR.  Also, the hospital DNR was signed, was

discharged home.  Follow as outpatient.

 



______________________________

SUMMER TABOR MD



DR:  RAGINI/aidan  JOB#:  [id number] / [id number]

DD:  05/19/2017 15:36  DT:  05/19/2017 22:09

## 2017-05-19 NOTE — PDOC
PROGRESS NOTES


Subjective


Subjective


feels better ,want  to go home, not using BIPAP





Objective


Objective





Vital Signs








  Date Time  Temp Pulse Resp B/P (MAP) Pulse Ox O2 Delivery O2 Flow Rate FiO2


 


5/19/17 08:54     90 Nasal Cannula 6.0 


 


5/19/17 07:00 97.7 71 20 126/72 (90)    





 97.7       














Intake and Output 


 


 5/19/17





 07:00


 


Intake Total 930 ml


 


Output Total 500 ml


 


Balance 430 ml


 


 


 


Intake Oral 880 ml


 


IV Total 50 ml


 


Output Urine Total 500 ml


 


# Voids 4











Physical Exam


Abdomen:  Normal bowel sounds, Soft


Heart:  Regular rate, Normal S1


Extremities:  No cyanosis


General:  Alert


HEENT:  Atraumatic


Lungs:  Other (dec breath sounds)


MUSCULOSKELETAL:  No swelling


Neck:  Supple


Neuro:  Normal speech


Psych/Mental Status:  Mood NL


Skin:  No breakdown





Diagnosis


Problem List


Problems


Medical Problems:


(1) Acute on chronic respiratory failure


Status: Acute  





(2) Normocytic anemia


Status: Acute  





(3) Severe protein-calorie malnutrition


Status: Acute  











Assessment


Assessment


Problems


Medical Problems:


(1) Acute on chronic respiratory failure


Status: Acute  





(2) Normocytic anemia


Status: Acute  





(3) Severe protein-calorie malnutrition


Status: Acute  





FINAL IMPRESSION:


1.  Chronic obstructive pulmonary disease with acute exacerbation.


2.  Chronic chronic obstructive pulmonary disease.


3.  History of lung cancer, status post radiation treatment 5 years ago.


4.  Anxiety.


5.  Depression.


6.  Hypothyroidism.


7.  Osteoporosis, compression fractures of the spine, secondary to long term


steroid use.  The patient is on calcium and vitamin D and Boniva, but she does


not take Boniva.


8.  Thrombocytopenia.


9.  Anemia of chronic disease.





PLAN:  


d/c home today.


po prednisone 50 mg+vantin.


off BIPAP.


cxr today.


want  to go home today,(graduation in family  tomorrow)


Moved out of icu.3 days ago


on oxygen , standby BIPAP.


platelets improving to 125,000.


iv solumedrol change to po.


pt/ot.





At this time was admit to the hospital, IV Solu-Medrol, oxygen and


breathing treatment, BiPAP p.r.n.  The patient was consulted by Pulmonology. 


Hold off on the Lovenox secondary to low platelets.  If continues to go low, I


will get Hematology and the patient wishes DNR, does not want to be intubated.


Problems:  





Plan


Plan of Care


Problems


Medical Problems:


(1) Acute on chronic respiratory failure


Status: Acute  





(2) Normocytic anemia


Status: Acute  





(3) Severe protein-calorie malnutrition


Status: Acute  








Comment


Review of Relevant


I have reviewed the following items shan (where applicable) has been applied.


Labs





Microbiology


5/15/17 Blood Culture - Preliminary, Resulted


          NO GROWTH AFTER 3 DAYS


5/15/17 Urine Culture - Final, Complete


          


5/15/17 Urine Culture Result 1 (RENAE) - Final, Complete


Medications





Current Medications


Cefpodoxime Proxetil (Vantin) 100 mg BID PO  Last administered on 5/18/17at 21:

49;  Start 5/18/17 at 10:30


Methylprednisolone Sodium Succinate (SOLU-Medrol 125MG VIAL) 60 mg Q8HRS IV  

Last administered on 5/18/17at 21:50;  Start 5/18/17 at 14:00;  Stop 5/18/17 at 

22:01;  Status DC


Prednisone (Prednisone) 50 mg DAILY PO ;  Start 5/19/17 at 09:00


Vitals/I & O





Vital Sign - Last 24 Hours








 5/18/17 5/18/17 5/18/17 5/18/17





 10:52 11:22 14:55 15:36


 


Temp 98.1   97.1





 98.1   97.1


 


Pulse 69   86


 


Resp 24   24


 


B/P (MAP) 135/70 (91)   147/75 (99)


 


Pulse Ox 90   95


 


O2 Delivery Nasal Cannula Nasal Cannula Nasal Cannula Nasal Cannula


 


O2 Flow Rate 5.0 5.0 5.0 5.0


 


    





    





 5/18/17 5/18/17 5/18/17 5/18/17





 19:15 20:00 20:05 23:15


 


Temp 97.9   97.9





 97.9   97.9


 


Pulse 88   89


 


Resp 20   20


 


B/P (MAP) 140/66 (90)   125/67 (86)


 


Pulse Ox 94  96 97


 


O2 Delivery Nasal Cannula Nasal Cannula Nasal Cannula Nasal Cannula


 


O2 Flow Rate 5.0 5.0 5.0 5.0


 


    





    





 5/19/17 5/19/17 5/19/17 





 03:20 07:00 08:54 


 


Temp 97.5 97.7  





 97.5 97.7  


 


Pulse 82 71  


 


Resp 20 20  


 


B/P (MAP) 132/71 (91) 126/72 (90)  


 


Pulse Ox 97 95 90 


 


O2 Delivery Nasal Cannula Nasal Cannula Nasal Cannula 


 


O2 Flow Rate 5.0 5.0 6.0 














Intake and Output   


 


 5/18/17 5/18/17 5/19/17





 15:00 23:00 07:00


 


Intake Total 190 ml 440 ml 300 ml


 


Output Total   500 ml


 


Balance 190 ml 440 ml -200 ml

















SUMMER TABOR MD May 19, 2017 09:49

## 2017-05-19 NOTE — PDOC
Provider Note


Provider Note


Discharge summary Dictated.


#[id number]











SUMMER TABOR MD May 19, 2017 15:37

## 2017-06-12 ENCOUNTER — HOSPITAL ENCOUNTER (INPATIENT)
Dept: HOSPITAL 61 - ER | Age: 82
LOS: 4 days | Discharge: NURSING, REHAB OR LONG-TERM CARE | DRG: 64 | End: 2017-06-16
Attending: INTERNAL MEDICINE | Admitting: INTERNAL MEDICINE
Payer: MEDICARE

## 2017-06-12 VITALS — WEIGHT: 136.5 LBS | HEIGHT: 58 IN | BODY MASS INDEX: 28.65 KG/M2

## 2017-06-12 VITALS — SYSTOLIC BLOOD PRESSURE: 129 MMHG | DIASTOLIC BLOOD PRESSURE: 70 MMHG

## 2017-06-12 VITALS — DIASTOLIC BLOOD PRESSURE: 61 MMHG | SYSTOLIC BLOOD PRESSURE: 121 MMHG

## 2017-06-12 VITALS — DIASTOLIC BLOOD PRESSURE: 67 MMHG | SYSTOLIC BLOOD PRESSURE: 131 MMHG

## 2017-06-12 VITALS — SYSTOLIC BLOOD PRESSURE: 114 MMHG | DIASTOLIC BLOOD PRESSURE: 53 MMHG

## 2017-06-12 DIAGNOSIS — Z85.118: ICD-10-CM

## 2017-06-12 DIAGNOSIS — Z79.899: ICD-10-CM

## 2017-06-12 DIAGNOSIS — J44.9: ICD-10-CM

## 2017-06-12 DIAGNOSIS — M17.0: ICD-10-CM

## 2017-06-12 DIAGNOSIS — F41.9: ICD-10-CM

## 2017-06-12 DIAGNOSIS — G93.41: ICD-10-CM

## 2017-06-12 DIAGNOSIS — Z91.81: ICD-10-CM

## 2017-06-12 DIAGNOSIS — Z88.5: ICD-10-CM

## 2017-06-12 DIAGNOSIS — C34.90: ICD-10-CM

## 2017-06-12 DIAGNOSIS — M51.36: ICD-10-CM

## 2017-06-12 DIAGNOSIS — Z66: ICD-10-CM

## 2017-06-12 DIAGNOSIS — F32.9: ICD-10-CM

## 2017-06-12 DIAGNOSIS — Z82.3: ICD-10-CM

## 2017-06-12 DIAGNOSIS — I70.0: ICD-10-CM

## 2017-06-12 DIAGNOSIS — Z88.6: ICD-10-CM

## 2017-06-12 DIAGNOSIS — I27.2: ICD-10-CM

## 2017-06-12 DIAGNOSIS — Z83.3: ICD-10-CM

## 2017-06-12 DIAGNOSIS — D64.9: ICD-10-CM

## 2017-06-12 DIAGNOSIS — M81.0: ICD-10-CM

## 2017-06-12 DIAGNOSIS — I48.0: ICD-10-CM

## 2017-06-12 DIAGNOSIS — Z99.81: ICD-10-CM
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
## 2017-06-12 DIAGNOSIS — Z82.49: ICD-10-CM
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
## 2017-06-12 DIAGNOSIS — Z90.49: ICD-10-CM

## 2017-06-12 DIAGNOSIS — Z88.8: ICD-10-CM

## 2017-06-12 DIAGNOSIS — I63.9: Primary | ICD-10-CM

## 2017-06-12 DIAGNOSIS — Z92.3: ICD-10-CM

## 2017-06-12 DIAGNOSIS — J96.21: ICD-10-CM

## 2017-06-12 DIAGNOSIS — E03.9: ICD-10-CM

## 2017-06-12 DIAGNOSIS — J96.22: ICD-10-CM

## 2017-06-12 DIAGNOSIS — Z87.01: ICD-10-CM
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
## 2017-06-12 DIAGNOSIS — I10: ICD-10-CM

## 2017-06-12 DIAGNOSIS — G62.9: ICD-10-CM

## 2017-06-12 DIAGNOSIS — Z90.710: ICD-10-CM

## 2017-06-12 LAB
ALBUMIN SERPL-MCNC: 2.6 G/DL (ref 3.4–5)
ALBUMIN/GLOB SERPL: 0.9 {RATIO} (ref 1–1.7)
ALP SERPL-CCNC: 50 U/L (ref 46–116)
ALT SERPL-CCNC: 13 U/L (ref 14–59)
ANION GAP SERPL CALC-SCNC: (no result) MMOL/L (ref 6–14)
AST SERPL-CCNC: 15 U/L (ref 15–37)
BACTERIA #/AREA URNS HPF: (no result) /HPF
BASE EXCESS STD BLDA CALC-SCNC: 10 MMOL/L (ref -3–3)
BASE EXCESS STD BLDA CALC-SCNC: 15 MMOL/L (ref -3–3)
BASOPHILS # BLD AUTO: 0 X10^3/UL (ref 0–0.2)
BASOPHILS NFR BLD: 0 % (ref 0–3)
BILIRUB SERPL-MCNC: 0.6 MG/DL (ref 0.2–1)
BILIRUB UR QL STRIP: NEGATIVE
BUN SERPL-MCNC: 17 MG/DL (ref 7–20)
BUN/CREAT SERPL: 17 (ref 6–20)
CALCIUM SERPL-MCNC: 8.3 MG/DL (ref 8.5–10.1)
CHLORIDE SERPL-SCNC: 100 MMOL/L (ref 98–107)
CK SERPL-CCNC: 16 U/L (ref 26–192)
CO2 SERPL-SCNC: > 45 MMOL/L (ref 21–32)
COHGB MFR BLD: 0.1 % (ref 0–1.9)
COHGB MFR BLD: 0.2 % (ref 0–1.9)
CREAT SERPL-MCNC: 1 MG/DL (ref 0.6–1)
EOSINOPHIL NFR BLD: 2 % (ref 0–3)
ERYTHROCYTE [DISTWIDTH] IN BLOOD BY AUTOMATED COUNT: 16.9 % (ref 11.5–14.5)
FIO2 COOX: 30
FIO2 COOX: 38
GFR SERPLBLD BASED ON 1.73 SQ M-ARVRAT: 52.8 ML/MIN
GLOBULIN SER-MCNC: 3 G/DL (ref 2.2–3.8)
GLUCOSE SERPL-MCNC: 90 MG/DL (ref 70–99)
GLUCOSE UR STRIP-MCNC: NEGATIVE MG/DL
HCO3 BLDA-SCNC: 38 MMOL/L (ref 21–28)
HCO3 BLDA-SCNC: 41 MMOL/L (ref 21–28)
HCT VFR BLD CALC: 27.8 % (ref 36–47)
HGB BLD-MCNC: 8.5 G/DL (ref 12–15.5)
HGB BLD-MCNC: 9.3 G/DL
HGB BLD-MCNC: 9.6 G/DL
INR PPP: 0.9 (ref 0.8–1.1)
LYMPHOCYTES # BLD: 1.6 X10^3/UL (ref 1–4.8)
LYMPHOCYTES NFR BLD AUTO: 31 % (ref 24–48)
MCH RBC QN AUTO: 28 PG (ref 25–35)
MCHC RBC AUTO-ENTMCNC: 31 G/DL (ref 31–37)
MCV RBC AUTO: 92 FL (ref 79–100)
METHGB MFR BLD: 0.4 % (ref 0–1.9)
METHGB MFR BLD: 0.4 % (ref 0–1.9)
MONOCYTES NFR BLD: 15 % (ref 0–9)
NEUTROPHILS NFR BLD AUTO: 52 % (ref 31–73)
NITRITE UR QL STRIP: NEGATIVE
OXYHGB MFR BLD: 93.4 %
OXYHGB MFR BLD: 97.2 %
PCO2 BLDA: 62 MMHG (ref 35–46)
PCO2 BLDA: 74 MMHG (ref 35–46)
PH COOX: 7.33 (ref 7.35–7.45)
PH COOX: 7.44 (ref 7.35–7.45)
PH UR STRIP: 7 [PH]
PLATELET # BLD AUTO: 176 X10^3/UL (ref 140–400)
PO2 BLDA: 123 MMHG (ref 65–108)
PO2 BLDA: 72 MMHG (ref 65–108)
POTASSIUM SERPL-SCNC: 4.1 MMOL/L (ref 3.5–5.1)
PROT SERPL-MCNC: 5.6 G/DL (ref 6.4–8.2)
PROT UR STRIP-MCNC: NEGATIVE MG/DL
PROTHROMBIN TIME: 11.7 SEC (ref 11.7–14)
RBC # BLD AUTO: 3.03 X10^6/UL (ref 3.5–5.4)
RBC #/AREA URNS HPF: (no result) /HPF (ref 0–2)
SAO2 % BLDA: 94 % (ref 92–99)
SAO2 % BLDA: 98 % (ref 92–99)
SODIUM SERPL-SCNC: 143 MMOL/L (ref 136–145)
SP GR UR STRIP: 1.01
SQUAMOUS #/AREA URNS LPF: (no result) /LPF
T4 FREE SERPL-MCNC: 1.43 NG/DL (ref 0.76–1.46)
UROBILINOGEN UR-MCNC: 0.2 MG/DL
WBC # BLD AUTO: 5.3 X10^3/UL (ref 4–11)
WBC #/AREA URNS HPF: >40 /HPF (ref 0–4)

## 2017-06-12 PROCEDURE — 82805 BLOOD GASES W/O2 SATURATION: CPT

## 2017-06-12 PROCEDURE — 82607 VITAMIN B-12: CPT

## 2017-06-12 PROCEDURE — 84132 ASSAY OF SERUM POTASSIUM: CPT

## 2017-06-12 PROCEDURE — 85027 COMPLETE CBC AUTOMATED: CPT

## 2017-06-12 PROCEDURE — 71010: CPT

## 2017-06-12 PROCEDURE — A9585 GADOBUTROL INJECTION: HCPCS

## 2017-06-12 PROCEDURE — 72125 CT NECK SPINE W/O DYE: CPT

## 2017-06-12 PROCEDURE — 84443 ASSAY THYROID STIM HORMONE: CPT

## 2017-06-12 PROCEDURE — 84484 ASSAY OF TROPONIN QUANT: CPT

## 2017-06-12 PROCEDURE — 82550 ASSAY OF CK (CPK): CPT

## 2017-06-12 PROCEDURE — 84481 FREE ASSAY (FT-3): CPT

## 2017-06-12 PROCEDURE — 96365 THER/PROPH/DIAG IV INF INIT: CPT

## 2017-06-12 PROCEDURE — 93306 TTE W/DOPPLER COMPLETE: CPT

## 2017-06-12 PROCEDURE — 94640 AIRWAY INHALATION TREATMENT: CPT

## 2017-06-12 PROCEDURE — 80048 BASIC METABOLIC PNL TOTAL CA: CPT

## 2017-06-12 PROCEDURE — 73060 X-RAY EXAM OF HUMERUS: CPT

## 2017-06-12 PROCEDURE — 36415 COLL VENOUS BLD VENIPUNCTURE: CPT

## 2017-06-12 PROCEDURE — 70553 MRI BRAIN STEM W/O & W/DYE: CPT

## 2017-06-12 PROCEDURE — 80061 LIPID PANEL: CPT

## 2017-06-12 PROCEDURE — 87086 URINE CULTURE/COLONY COUNT: CPT

## 2017-06-12 PROCEDURE — 5A09357 ASSISTANCE WITH RESPIRATORY VENTILATION, LESS THAN 24 CONSECUTIVE HOURS, CONTINUOUS POSITIVE AIRWAY PRESSURE: ICD-10-PCS | Performed by: INTERNAL MEDICINE

## 2017-06-12 PROCEDURE — 94760 N-INVAS EAR/PLS OXIMETRY 1: CPT

## 2017-06-12 PROCEDURE — 80162 ASSAY OF DIGOXIN TOTAL: CPT

## 2017-06-12 PROCEDURE — 93005 ELECTROCARDIOGRAM TRACING: CPT

## 2017-06-12 PROCEDURE — 83735 ASSAY OF MAGNESIUM: CPT

## 2017-06-12 PROCEDURE — 84439 ASSAY OF FREE THYROXINE: CPT

## 2017-06-12 PROCEDURE — 83880 ASSAY OF NATRIURETIC PEPTIDE: CPT

## 2017-06-12 PROCEDURE — 93880 EXTRACRANIAL BILAT STUDY: CPT

## 2017-06-12 PROCEDURE — 80053 COMPREHEN METABOLIC PANEL: CPT

## 2017-06-12 PROCEDURE — 81001 URINALYSIS AUTO W/SCOPE: CPT

## 2017-06-12 PROCEDURE — 94660 CPAP INITIATION&MGMT: CPT

## 2017-06-12 PROCEDURE — 36600 WITHDRAWAL OF ARTERIAL BLOOD: CPT

## 2017-06-12 PROCEDURE — 70450 CT HEAD/BRAIN W/O DYE: CPT

## 2017-06-12 PROCEDURE — 96366 THER/PROPH/DIAG IV INF ADDON: CPT

## 2017-06-12 PROCEDURE — 85610 PROTHROMBIN TIME: CPT

## 2017-06-12 RX ADMIN — IPRATROPIUM BROMIDE AND ALBUTEROL SULFATE SCH ML: .5; 3 SOLUTION RESPIRATORY (INHALATION) at 21:25

## 2017-06-12 RX ADMIN — BACITRACIN SCH MLS/HR: 5000 INJECTION, POWDER, FOR SOLUTION INTRAMUSCULAR at 22:30

## 2017-06-12 RX ADMIN — IPRATROPIUM BROMIDE AND ALBUTEROL SULFATE SCH ML: .5; 3 SOLUTION RESPIRATORY (INHALATION) at 23:33

## 2017-06-12 RX ADMIN — BACITRACIN SCH MLS/HR: 5000 INJECTION, POWDER, FOR SOLUTION INTRAMUSCULAR at 09:10

## 2017-06-12 RX ADMIN — DEXTROSE AND SODIUM CHLORIDE SCH MLS/HR: 5; .9 INJECTION, SOLUTION INTRAVENOUS at 10:45

## 2017-06-12 RX ADMIN — ONDANSETRON SCH MG: 4 TABLET, ORALLY DISINTEGRATING ORAL at 22:10

## 2017-06-12 RX ADMIN — TRAZODONE HYDROCHLORIDE SCH MG: 100 TABLET ORAL at 22:10

## 2017-06-12 RX ADMIN — OXYCODONE HYDROCHLORIDE AND ACETAMINOPHEN PRN TAB: 10; 325 TABLET ORAL at 18:58

## 2017-06-12 NOTE — RAD
AP portable chest  radiograph 6/12/2017



Clinical History: Fall with chest pain.



An AP portable erect digital radiograph of the chest was obtained. Comparison

study is dated 5/15/2017..



The patient is status post multilevel thoracic and lumbar kyphoplasty

procedures, unchanged. The cardiac silhouette is mildly enlarged.

Atherosclerotic calcification of the thoracic aorta is seen. Areas of scarring

and/or subsegmental atelectasis are seen involving both lower lobes. No

pneumothorax or large pleural effusion is seen. Prominence of the interstitial

markings in both lungs is seen which could reflect mild CHF. There is diffuse

osteopenia the visualized bony structures. Degenerative changes are seen

involving the thoracic spine and both shoulders.



Impression: Question mild CHF.

## 2017-06-12 NOTE — RAD
CT scan of the head without contrast 6/12/2017



Clinical History: Fall. Inability to answer questions and follow commands.



Technique: Unenhanced, contiguous, 5 mm axial sections were obtained through

the head.



One or more of the following individualized dose reduction techniques were

utilized for this study:



1. Automated exposure control.

2. Adjustment of the mA and/or kV according to patient size.

3. Use of iterative reconstruction technique. 



Findings: Comparison is made to a MRI of the brain dated 9/16/2011.



There is generalized parenchymal atrophy.  Small scattered areas of decreased

attenuation are seen within the periventricular and subcortical white matter

of both cerebral hemispheres consistent with areas of small vessel ischemic

disease. Areas of encephalomalacia are seen involving the posterior

temporal/parietal lobes, left greater than right, the right frontal lobe and

the right cerebellar hemisphere. No acute parenchymal abnormality is seen. No

extra-axial fluid collection is noted. No skull fracture is seen.



Impression:  No acute intracranial abnormality is seen.





CT scan of the cervical spine without contrast 6/12/2017



Clinical history: Neck pain post fall.



Technique: Unenhanced, contiguous, 0.625 mm axial sections were obtained

through the cervical spine. 3 mm reconstructed sagittal, axial, and and

coronal images were obtained.



One or more of the following individualized dose reduction techniques were

utilized for this study:



1. Automated exposure control.

2. Adjustment of the mA and/or kV according to patient size.

3. Use of iterative reconstruction technique.



Findings: Sagittal and coronal reconstructed images demonstrate diffuse

osteopenia of the visualized bony structures. Mild lateral curvature of the

cervical spine is seen convex to the left. Degenerative changes consisting of

disc space narrowing, vertebral endplate sclerosis and mild to moderate

anterior vertebral body osteophyte formation are seen involving the C4-5, C5-6

and C6-7 disc spaces.



No fracture or subluxation cervical vertebrae is seen. Degenerative changes

are seen along the uncovertebral and facet joints throughout the cervical disc

spaces. Atherosclerotic calcification is seen in the region of the carotid

bifurcations.



Impression: No fracture or subluxation of the cervical vertebrae is seen.

## 2017-06-12 NOTE — PHYS DOC
Past Medical History


Past Medical History:  A-Fib, Anxiety, Asthma, Bronchitis, Cancer, COPD, 

Depression, Hypothyroid, Pneumonia, Other


Additional Past Medical Histor:  chronic back, lung ca; hernia, heart dis, 

emphysema, Osteopor, home o2=5L


Past Surgical History:  Appendectomy, Cholecystectomy, , Hysterectomy, 

Tonsillectomy, Other


Additional Past Surgical Histo:  back X2, hernia x 3


Alcohol Use:  None


Drug Use:  None





Adult General


Chief Complaint


Chief Complaint:  MECHANICAL FALL





HPI


HPI





Patient is a 84  year old female who presents to the emergency department via 

EMS. According to EMS family had found patient on the floor that appears to 

have fallen out of bed. Unknown time of fall as patient lives alone. Patient is 

alert, able to answer yes and no questions. Unable to provide name or . 

Patient is able to follow simple commands as squeeze hands but unable to hold 

arms up. Patient was noted to have small bruise on the right cheek, and bruise 

size of grapefruit to the distal left humerus. According PMH patient has a hx 

of A-fib, COPD and cancer.





Review of Systems


Review of Systems





Constitutional: Denies fever or chills []


Eyes: Denies change in visual acuity, redness, or eye pain []


HENT: Denies nasal congestion or sore throat []


Respiratory: Denies cough or shortness of breath []


Cardiovascular: No additional information not addressed in HPI []


GI: Denies abdominal pain, nausea, vomiting, bloody stools or diarrhea []


: Denies dysuria or hematuria []


Musculoskeletal: Denies back pain or joint pain []


Integument: Denies rash or skin lesions []


Neurologic: Denies headache, focal weakness or sensory changes


Endocrine: Denies polyuria or polydipsia []





Current Medications


Current Medications





Current Medications








 Medications


  (Trade)  Dose


 Ordered  Sig/Franky  Start Time


 Stop Time Status Last Admin


Dose Admin


 


 Ceftriaxone Sodium  50 ml @ 


 100 mls/hr  1X  ONCE  17 08:30


 17 08:59 DC  


 


 


 Sodium Chloride  1,000 ml @ 


 75 mls/hr  [id number]  17 09:10


 17 09:09   


 











Allergies


Allergies





Allergies








Coded Allergies Type Severity Reaction Last Updated Verified


 


  morphine Allergy Intermediate PER DAUGHTER, DOES NOT AGREE WITH PT 14 Yes


 


  adhesive Adverse Reaction Intermediate blisters 4/22/15 Yes


 


  aspirin Adverse Reaction Intermediate Nausea and Vomiting, "makes me sick" 4/

22/15 Yes











Physical Exam


Physical Exam





Constitutional: Well developed, well nourished, no acute distress, non-toxic 

appearance. []


HENT: Normocephalic, atraumatic, bilateral external ears normal, oropharynx 

moist, no oral exudates, nose normal. Bilateral TM normal


Eyes: PERRLA, EOMI, conjunctiva normal, no discharge. [] 


Neck: Normal range of motion, no tenderness, supple, no stridor. [] 


Cardiovascular:Heart rate regular rhythm, no murmur []


Lungs & Thorax:  Bilateral breath sounds clear to auscultation. Patient on O2, 

5L for HX lung CA


Abdomen: Bowel sounds hypoactive, soft, no tenderness, no masses, no pulsatile 

masses. [] 


Skin: Warm, dry, no erythema, no rash. Small dime size bruise noted to the 

right cheek, grapefruits size bruise noted to the left distal humerus


Back: No cervical spine, thoracic spine, lumbar spine tenderness, no crepitus, 

no deformity, no step-offs noted


Extremities: No tenderness, no cyanosis, no clubbing, ROM intact, no edema. [] 


Neurologic: Alert, patient unable to answer open ended questions she is able to 

answer yes and no questions, patient with equal  noted bilaterally, unable 

to hold bilateral arms up. Patient was able to assist in turning to the right 

side.


Psychologic: Affect normal, judgement normal, mood normal. []





Current Patient Data


Vital Signs





 Vital Signs








  Date Time  Temp Pulse Resp B/P (MAP) Pulse Ox O2 Delivery O2 Flow Rate FiO2


 


17 07:34 98.6 67 16 122/62 (82) 100 Nasal Cannula 5.0 





 98.6       








Lab Values





 Laboratory Tests








Test


  17


07:45 17


08:10 17


08:59


 


Urine Collection Type U cath    


 


Urine Color Straw    


 


Urine Clarity Cloudy    


 


Urine pH 7.0    


 


Urine Specific Gravity 1.015    


 


Urine Protein


  Negative mg/dL


(NEG-TRACE) 


  


 


 


Urine Glucose (UA)


  Negative mg/dL


(NEG) 


  


 


 


Urine Ketones (Stick)


  Negative mg/dL


(NEG) 


  


 


 


Urine Blood Small (NEG)    


 


Urine Nitrite


  Negative (NEG)


  


  


 


 


Urine Bilirubin


  Negative (NEG)


  


  


 


 


Urine Urobilinogen Dipstick


  0.2 mg/dL (0.2


mg/dL) 


  


 


 


Urine Leukocyte Esterase Large (NEG)    


 


Urine RBC


  1-2 /HPF (0-2)


  


  


 


 


Urine WBC


  >40 /HPF (0-4)


  


  


 


 


Urine Squamous Epithelial


Cells Occ /LPF  


  


  


 


 


Urine Bacteria


  Few /HPF


(0-FEW) 


  


 


 


White Blood Count


  


  5.3 x10^3/uL


(4.0-11.0) 


 


 


Red Blood Count


  


  3.03 x10^6/uL


(3.50-5.40)  L 


 


 


Hemoglobin


  


  8.5 g/dL


(12.0-15.5)  L 


 


 


Hematocrit


  


  27.8 %


(36.0-47.0)  L 


 


 


Mean Corpuscular Volume


  


  92 fL ()


  


 


 


Mean Corpuscular Hemoglobin  28 pg (25-35)   


 


Mean Corpuscular Hemoglobin


Concent 


  31 g/dL


(31-37) 


 


 


Red Cell Distribution Width


  


  16.9 %


(11.5-14.5)  H 


 


 


Platelet Count


  


  176 x10^3/uL


(140-400) 


 


 


Neutrophils (%) (Auto)  52 % (31-73)   


 


Lymphocytes (%) (Auto)  31 % (24-48)   


 


Monocytes (%) (Auto)  15 % (0-9)  H 


 


Eosinophils (%) (Auto)  2 % (0-3)   


 


Basophils (%) (Auto)  0 % (0-3)   


 


Neutrophils # (Auto)


  


  2.8 x10^3uL


(1.8-7.7) 


 


 


Lymphocytes # (Auto)


  


  1.6 x10^3/uL


(1.0-4.8) 


 


 


Monocytes # (Auto)


  


  0.8 x10^3/uL


(0.0-1.1) 


 


 


Eosinophils # (Auto)


  


  0.1 x10^3/uL


(0.0-0.7) 


 


 


Basophils # (Auto)


  


  0.0 x10^3/uL


(0.0-0.2) 


 


 


Prothrombin Time


  


  11.7 SEC


(11.7-14.0) 


 


 


Prothrombin Time INR  0.9 (0.8-1.1)   


 


Sodium Level


  


  143 mmol/L


(136-145) 


 


 


Potassium Level


  


  4.1 mmol/L


(3.5-5.1) 


 


 


Chloride Level


  


  100 mmol/L


() 


 


 


Carbon Dioxide Level


  


  > 45 mmol/L


(21-32)  H 


 


 


Anion Gap   (6-14)   


 


Blood Urea Nitrogen


  


  17 mg/dL


(7-20) 


 


 


Creatinine


  


  1.0 mg/dL


(0.6-1.0) 


 


 


Estimated GFR


(Cockcroft-Gault) 


  52.8  


  


 


 


BUN/Creatinine Ratio  17 (6-20)   


 


Glucose Level


  


  90 mg/dL


(70-99) 


 


 


Calcium Level


  


  8.3 mg/dL


(8.5-10.1)  L 


 


 


Total Bilirubin


  


  0.6 mg/dL


(0.2-1.0) 


 


 


Aspartate Amino Transferase


(AST) 


  15 U/L (15-37)


  


 


 


Alanine Aminotransferase (ALT)


  


  13 U/L (14-59)


L 


 


 


Alkaline Phosphatase


  


  50 U/L


() 


 


 


Creatine Kinase


  


  16 U/L


()  L 


 


 


Troponin I Quantitative


  


  0.018 ng/mL


(0.000-0.055) 


 


 


Total Protein


  


  5.6 g/dL


(6.4-8.2)  L 


 


 


Albumin


  


  2.6 g/dL


(3.4-5.0)  L 


 


 


Albumin/Globulin Ratio


  


  0.9 (1.0-1.7)


L 


 


 


Thyroid Stimulating Hormone


(TSH) 


  0.256 uIU/mL


(0.358-3.74)  L 


 


 


Digoxin Level


  


  0.8 ng/mL


(0.9-2.0)  L 


 


 


Digoxin Last Dose Date  Unknown   


 


Digoxin Last Dose Time  Unknown   


 


O2 Saturation   98 % (92-99)  


 


Arterial Blood pH


  


  


  7.33


(7.35-7.45)  L


 


Arterial Blood pCO2 at


Patient Temp 


  


  74 mmHg


(35-46)  *H


 


Arterial Blood pO2 at Patient


Temp 


  


  123 mmHg


()  H


 


Arterial Blood HCO3


  


  


  38 mmol/L


(21-28)  H


 


Arterial Blood Base Excess


  


  


  10 mmol/L


(-3-3)  H


 


Oxyhemoglobin   97.2 %  


 


Methemoglobin


  


  


  0.4 %


(0.0-1.9)


 


Carbon Monoxide, Quantitative


  


  


  0.1 %


(0.0-1.9)


 


FiO2   38  





 Laboratory Tests


17 08:10








 Laboratory Tests


17 08:10











EKG


EKG


EKG completed at 0814 with HR 75 SR with PVC noted no STEMI per Dr Randolph[]





Radiology/Procedures


Radiology/Procedures


[facility]


 [address] [place] [address] [phone]


 


 IMAGING REPORT





 Signed





PATIENT: YAN COLLINS ACCOUNT: [id number] MRN#: [id number]


: 1932 LOCATION: ER AGE: 84


SEX: F EXAM DT: 17 ACCESSION#: [id number]


STATUS: REG ER ORD. PHYSICIAN: DARREN RUTLEDGE 


REASON: fall, unable to answer some questions and follow commands


PROCEDURE: CT HEAD AND CERVICAL SPINE WO








CT scan of the head without contrast 2017





Clinical History: Fall. Inability to answer questions and follow commands.





Technique: Unenhanced, contiguous, 5 mm axial sections were obtained through


the head.





One or more of the following individualized dose reduction techniques were


utilized for this study:





1. Automated exposure control.


2. Adjustment of the mA and/or kV according to patient size.


3. Use of iterative reconstruction technique. 





Findings: Comparison is made to a MRI of the brain dated 2011.





There is generalized parenchymal atrophy.  Small scattered areas of decreased


attenuation are seen within the periventricular and subcortical white matter


of both cerebral hemispheres consistent with areas of small vessel ischemic


disease. Areas of encephalomalacia are seen involving the posterior


temporal/parietal lobes, left greater than right, the right frontal lobe and


the right cerebellar hemisphere. No acute parenchymal abnormality is seen. No


extra-axial fluid collection is noted. No skull fracture is seen.





Impression:  No acute intracranial abnormality is seen.








CT scan of the cervical spine without contrast 2017





Clinical history: Neck pain post fall.





Technique: Unenhanced, contiguous, 0.625 mm axial sections were obtained


through the cervical spine. 3 mm reconstructed sagittal, axial, and and


coronal images were obtained.





One or more of the following individualized dose reduction techniques were


utilized for this study:





1. Automated exposure control.


2. Adjustment of the mA and/or kV according to patient size.


3. Use of iterative reconstruction technique.





Findings: Sagittal and coronal reconstructed images demonstrate diffuse


osteopenia of the visualized bony structures. Mild lateral curvature of the


cervical spine is seen convex to the left. Degenerative changes consisting of


disc space narrowing, vertebral endplate sclerosis and mild to moderate


anterior vertebral body osteophyte formation are seen involving the C4-5, C5-6


and C6-7 disc spaces.





No fracture or subluxation cervical vertebrae is seen. Degenerative changes


are seen along the uncovertebral and facet joints throughout the cervical disc


spaces. Atherosclerotic calcification is seen in the region of the carotid


bifurcations.





Impression: No fracture or subluxation of the cervical vertebrae is seen.




















DICTATED and SIGNED BY:     YAHIR BURROWS MD


DATE:     17 0856





CC: DARREN RUTLEDGE; SG TABOR MD ~


[]





[facility]


 [address] [place] [address]


 [phone]


 


 IMAGING REPORT





 Signed





PATIENT: YAN COLLINS ACCOUNT: [id number] MRN#: [id number]


: 1932 LOCATION: ER AGE: 84


SEX: F EXAM DT: 17 ACCESSION#: [id number]


STATUS: REG ER ORD. PHYSICIAN: DARREN RUTLEDGE 


REASON: fall from bed, altered mental status


PROCEDURE: PORTABLE CHEST 1V








AP portable chest  radiograph 2017





Clinical History: Fall with chest pain.





An AP portable erect digital radiograph of the chest was obtained. Comparison


study is dated 5/15/2017..





The patient is status post multilevel thoracic and lumbar kyphoplasty


procedures, unchanged. The cardiac silhouette is mildly enlarged.


Atherosclerotic calcification of the thoracic aorta is seen. Areas of scarring


and/or subsegmental atelectasis are seen involving both lower lobes. No


pneumothorax or large pleural effusion is seen. Prominence of the interstitial


markings in both lungs is seen which could reflect mild CHF. There is diffuse


osteopenia the visualized bony structures. Degenerative changes are seen


involving the thoracic spine and both shoulders.





Impression: Question mild CHF.














DICTATED and SIGNED BY:     YAHIR BURROWS MD


DATE:     17 0908





CC: DARREN RUTLEDGE; SG TABOR MD ~





Course & Med Decision Making


Course & Med Decision Making


Pertinent Labs and Imaging studies reviewed. (See chart for details)


0805 According to the daughter patient was last known to be normal last night. 

She states that the patient lives with another daughter. The daughter at 

bedside states she is not normal at this times as the patient does not speak to 

the patient.


Nursing staff states after my assessment patient was able to hold her left up, 

right arm had some drift noted. Patient was able to provider her first name but 

had difficulty with her last name, She was however able to provider her . 


0823 Urine positive for UTI. Rocephin 1 gm IV ordered.


0910 Dr Tabor in regards to admission for this patient. Dr Tabor is aware ABG'

s and results of CT, CXR and humerus are still pending. Dr Tabor agrees with 

admission, patient is DNR. Orders completed.


921 Patient re-evaluated with patient able to provide her name, , and the 

year as well as to where she is. 


925 Dr Randolph spoke with RT in regards to placing patient being placed on 

Bipap and repeat ABG's  


937 Spoke with Dr Morillo in regards to the patient being admitted with altered 

mental status.





[]





Dragon Disclaimer


Dragon Disclaimer


This electronic medical record was generated, in whole or in part, using a 

voice recognition dictation system.





Departure


Departure


Impression:  


 Primary Impression:  


 Altered mental status


 Additional Impressions:  


 UTI (urinary tract infection)


 Fall


Disposition:  09 ADMITTED AS INPATIENT


Admitting Physician:  Sg Tabor


Referrals:  


SG TABOR MD (PCP)





Problem Qualifiers











DARREN RUTLEDGE 2017 07:58

## 2017-06-12 NOTE — PDOC
Provider Note


Provider Note


Pt seen in ER.H&P dictated.


#[id number]











SUMMER TABOR MD Jun 12, 2017 10:50

## 2017-06-12 NOTE — ACF
Admit Criteria Forms


                                           MENTAL STATUS CHANGE





   Clinical Indications for Inpatient Care    


                                                                     


                                                                 (Place 'X' for 

any and all applicable criteria):  





Ongoing inpatient care may be needed for 1 or more of the following(1)(2)(3)(5)(

6): 





[X]I.    Suspected serious etiology (eg, medical disorder, CNS event) of 

altered mental status


[ ]II.   Danger to self or others not manageable at lower level of care


[ ]III.  Grave disability (eg, inability to perform self care necessary at 

lower level of care)


[ ]IV. Agitation or inappropriate behavior interfering with care for primary 

condition (eg, attempting to discontinue


        lines or drains prematurely, unable to cooperate with respiratory care)


[ ]V.  Delirium [A] [D][E] as described by 1 or more of the following(26):


         [ ]a)  Delirium due to alcohol or sedative [F] withdrawal


         [ ]b)  Delirium of uncertain etiology that has not responded to 

appropriate empiric treatment


         [ ]c)  Delirium that prevents performance of a life-sustaining 

function (eg, feeding or hydrating oneself)





[ ]VI.  General contraindications and/or Inappropriate clinical situations for 

Observational Care in patients


          with Mental Status Change, when ANY ONE of the following is required:


          [ ]a)   Prediction of prolongation of LOS based on ANY ONE of the 

following may be considered as 


                    a contraindication for observational care 2, 3, 4, 5, 6, 7, 

8, 9, 10, 11


                    [ ]i)    Age > 65 yrs.


                    [ ]ii)   Patient arriving by ambulance


                    [ ]iii)  Patient with high acuity


                    [ ]iv)  Patient requiring vital sign monitoring


                    [ ]v)   Patient on IV medication


          [ ]b)   Systolic blood pressures  greater than or equal to 180mmHg 3,

12


          [ ]c)   Patient with altered mental status including delirium and 

other alteration of consciousness, (3)


          [ ]d)   Patient whose discharge disposition will be to a skilled 

nursing home or rehabilitation home should 


                   not be managed in Emergency Department Observation Unit. CMS 

rule requires 3 days hospital stay before such placement.3,13


          [ ]e)   Patient with failure to thrive due to broad array of 

etiologies 3,16,17


          [ ]f)   Inability to ambulate 3,14








Extended stay beyond goal length of stay for the primary condition may be 

needed until ALL of the following are present(3)(5):


[ ]a)  Underlying medical etiology of mental status change is absent, or has 

been established and adequately treated


[ ]b)  Danger to self or others is absent or manageable at lower level of care.


[ ]c)  Behavior crisis management, including physical or chemical restraints, 

is not required or available at lower level of car


[ ]d)  Substance or alcohol withdrawal is absent or manageable at lower level 

of care.


[ ]e)  Behavioral symptoms (eg, agitation, somnolence, inappropriate behavior) 

are absent, or are manageable at lower level of care.








The original [facility] content created by [facility]"[employer] has been revised. 


The portions of the content which have been revised are identified through the 

use of italic text or in bold, and [facility] 


has neither reviewed nor approved the modified material. All other unmodified 

content is copyright  [facility] [employer].





Please see references footnoted in the original [facility] edition 

2016











CONNOR BOWLES Jun 12, 2017 11:20

## 2017-06-12 NOTE — PDOC
PULMONARY PROGRESS NOTES


Vitals





Vital Signs








  Date Time  Temp Pulse Resp B/P (MAP) Pulse Ox O2 Delivery O2 Flow Rate FiO2


 


6/12/17 15:52     92 Nasal Cannula 2.0 


 


6/12/17 15:00 98.2 53 22 129/70 (89)    





 98.2       








General:  Alert, No acute distress


HEENT:  Other


Lungs:  Other


Cardiovascular:  S1, S2


Abdomen:  Soft, Non-tender


Extremities:  No Edema


Labs





Laboratory Tests








Test


  6/12/17


07:45 6/12/17


08:10 6/12/17


08:59 6/12/17


09:34


 


Urine Collection Type U cath    


 


Urine Color Straw    


 


Urine Clarity Cloudy    


 


Urine pH 7.0    


 


Urine Specific Gravity 1.015    


 


Urine Protein


  Negative mg/dL


(NEG-TRACE) 


  


  


 


 


Urine Glucose (UA)


  Negative mg/dL


(NEG) 


  


  


 


 


Urine Ketones (Stick)


  Negative mg/dL


(NEG) 


  


  


 


 


Urine Blood Small (NEG)    


 


Urine Nitrite Negative (NEG)    


 


Urine Bilirubin Negative (NEG)    


 


Urine Urobilinogen Dipstick


  0.2 mg/dL (0.2


mg/dL) 


  


  


 


 


Urine Leukocyte Esterase Large (NEG)    


 


Urine RBC 1-2 /HPF (0-2)    


 


Urine WBC >40 /HPF (0-4)    


 


Urine Squamous Epithelial


Cells Occ /LPF 


  


  


  


 


 


Urine Bacteria


  Few /HPF


(0-FEW) 


  


  


 


 


White Blood Count


  


  5.3 x10^3/uL


(4.0-11.0) 


  


 


 


Red Blood Count


  


  3.03 x10^6/uL


(3.50-5.40) 


  


 


 


Hemoglobin


  


  8.5 g/dL


(12.0-15.5) 


  


 


 


Hematocrit


  


  27.8 %


(36.0-47.0) 


  


 


 


Mean Corpuscular Volume  92 fL ()   


 


Mean Corpuscular Hemoglobin  28 pg (25-35)   


 


Mean Corpuscular Hemoglobin


Concent 


  31 g/dL


(31-37) 


  


 


 


Red Cell Distribution Width


  


  16.9 %


(11.5-14.5) 


  


 


 


Platelet Count


  


  176 x10^3/uL


(140-400) 


  


 


 


Neutrophils (%) (Auto)  52 % (31-73)   


 


Lymphocytes (%) (Auto)  31 % (24-48)   


 


Monocytes (%) (Auto)  15 % (0-9)   


 


Eosinophils (%) (Auto)  2 % (0-3)   


 


Basophils (%) (Auto)  0 % (0-3)   


 


Neutrophils # (Auto)


  


  2.8 x10^3uL


(1.8-7.7) 


  


 


 


Lymphocytes # (Auto)


  


  1.6 x10^3/uL


(1.0-4.8) 


  


 


 


Monocytes # (Auto)


  


  0.8 x10^3/uL


(0.0-1.1) 


  


 


 


Eosinophils # (Auto)


  


  0.1 x10^3/uL


(0.0-0.7) 


  


 


 


Basophils # (Auto)


  


  0.0 x10^3/uL


(0.0-0.2) 


  


 


 


Prothrombin Time


  


  11.7 SEC


(11.7-14.0) 


  


 


 


Prothromb Time International


Ratio 


  0.9 (0.8-1.1) 


  


  


 


 


Sodium Level


  


  143 mmol/L


(136-145) 


  


 


 


Potassium Level


  


  4.1 mmol/L


(3.5-5.1) 


  


 


 


Chloride Level


  


  100 mmol/L


() 


  


 


 


Carbon Dioxide Level


  


  > 45 mmol/L


(21-32) 


  


 


 


Anion Gap   (6-14)   


 


Blood Urea Nitrogen


  


  17 mg/dL


(7-20) 


  


 


 


Creatinine


  


  1.0 mg/dL


(0.6-1.0) 


  


 


 


Estimated GFR


(Cockcroft-Gault) 


  52.8 


  


  


 


 


BUN/Creatinine Ratio  17 (6-20)   


 


Glucose Level


  


  90 mg/dL


(70-99) 


  


 


 


Calcium Level


  


  8.3 mg/dL


(8.5-10.1) 


  


 


 


Total Bilirubin


  


  0.6 mg/dL


(0.2-1.0) 


  


 


 


Aspartate Amino Transf


(AST/SGOT) 


  15 U/L (15-37) 


  


  


 


 


Alanine Aminotransferase


(ALT/SGPT) 


  13 U/L (14-59) 


  


  


 


 


Alkaline Phosphatase


  


  50 U/L


() 


  


 


 


Creatine Kinase


  


  16 U/L


() 


  


 


 


Troponin I Quantitative


  


  0.018 ng/mL


(0.000-0.055) 


  


 


 


NT-Pro-B-Type Natriuretic


Peptide 


  1090 pg/mL


(0-449) 


  


 


 


Total Protein


  


  5.6 g/dL


(6.4-8.2) 


  


 


 


Albumin


  


  2.6 g/dL


(3.4-5.0) 


  


 


 


Albumin/Globulin Ratio  0.9 (1.0-1.7)   


 


Vitamin B12 Level


  


  778 pg/mL


(247-911) 


  


 


 


Thyroid Stimulating Hormone


(TSH) 


  0.256 uIU/mL


(0.358-3.74) 


  


 


 


Free Thyroxine


  


  1.43 ng/dL


(0.76-1.46) 


  


 


 


Free Triiodothyronine (T3)


pg/mL 


  1.95 pg/mL


(2.18-3.98) 


  


 


 


Digoxin Level


  


  0.8 ng/mL


(0.9-2.0) 


  


 


 


Digoxin Last Dose Date  Unknown   


 


Digoxin Last Dose Time  Unknown   


 


O2 Saturation   98 % (92-99)  94 % (92-99) 


 


Arterial Blood pH


  


  


  7.33


(7.35-7.45) 7.44


(7.35-7.45)


 


Arterial Blood pCO2 at


Patient Temp 


  


  74 mmHg


(35-46) 62 mmHg


(35-46)


 


Arterial Blood pO2 at Patient


Temp 


  


  123 mmHg


() 72 mmHg


()


 


Arterial Blood HCO3


  


  


  38 mmol/L


(21-28) 41 mmol/L


(21-28)


 


Arterial Blood Base Excess


  


  


  10 mmol/L


(-3-3) 15 mmol/L


(-3-3)


 


Oxyhemoglobin   97.2 %  93.4 % 


 


Methemoglobin


  


  


  0.4 %


(0.0-1.9) 0.4 %


(0.0-1.9)


 


Carbon Monoxide, Quantitative


  


  


  0.1 %


(0.0-1.9) 0.2 %


(0.0-1.9)


 


FiO2   38  30 








Laboratory Tests








Test


  6/12/17


07:45 6/12/17


08:10 6/12/17


08:59 6/12/17


09:34


 


Urine Collection Type U cath    


 


Urine Color Straw    


 


Urine Clarity Cloudy    


 


Urine pH 7.0    


 


Urine Specific Gravity 1.015    


 


Urine Protein


  Negative mg/dL


(NEG-TRACE) 


  


  


 


 


Urine Glucose (UA)


  Negative mg/dL


(NEG) 


  


  


 


 


Urine Ketones (Stick)


  Negative mg/dL


(NEG) 


  


  


 


 


Urine Blood Small (NEG)    


 


Urine Nitrite Negative (NEG)    


 


Urine Bilirubin Negative (NEG)    


 


Urine Urobilinogen Dipstick


  0.2 mg/dL (0.2


mg/dL) 


  


  


 


 


Urine Leukocyte Esterase Large (NEG)    


 


Urine RBC 1-2 /HPF (0-2)    


 


Urine WBC >40 /HPF (0-4)    


 


Urine Squamous Epithelial


Cells Occ /LPF 


  


  


  


 


 


Urine Bacteria


  Few /HPF


(0-FEW) 


  


  


 


 


White Blood Count


  


  5.3 x10^3/uL


(4.0-11.0) 


  


 


 


Red Blood Count


  


  3.03 x10^6/uL


(3.50-5.40) 


  


 


 


Hemoglobin


  


  8.5 g/dL


(12.0-15.5) 


  


 


 


Hematocrit


  


  27.8 %


(36.0-47.0) 


  


 


 


Mean Corpuscular Volume  92 fL ()   


 


Mean Corpuscular Hemoglobin  28 pg (25-35)   


 


Mean Corpuscular Hemoglobin


Concent 


  31 g/dL


(31-37) 


  


 


 


Red Cell Distribution Width


  


  16.9 %


(11.5-14.5) 


  


 


 


Platelet Count


  


  176 x10^3/uL


(140-400) 


  


 


 


Neutrophils (%) (Auto)  52 % (31-73)   


 


Lymphocytes (%) (Auto)  31 % (24-48)   


 


Monocytes (%) (Auto)  15 % (0-9)   


 


Eosinophils (%) (Auto)  2 % (0-3)   


 


Basophils (%) (Auto)  0 % (0-3)   


 


Neutrophils # (Auto)


  


  2.8 x10^3uL


(1.8-7.7) 


  


 


 


Lymphocytes # (Auto)


  


  1.6 x10^3/uL


(1.0-4.8) 


  


 


 


Monocytes # (Auto)


  


  0.8 x10^3/uL


(0.0-1.1) 


  


 


 


Eosinophils # (Auto)


  


  0.1 x10^3/uL


(0.0-0.7) 


  


 


 


Basophils # (Auto)


  


  0.0 x10^3/uL


(0.0-0.2) 


  


 


 


Prothrombin Time


  


  11.7 SEC


(11.7-14.0) 


  


 


 


Prothromb Time International


Ratio 


  0.9 (0.8-1.1) 


  


  


 


 


Sodium Level


  


  143 mmol/L


(136-145) 


  


 


 


Potassium Level


  


  4.1 mmol/L


(3.5-5.1) 


  


 


 


Chloride Level


  


  100 mmol/L


() 


  


 


 


Carbon Dioxide Level


  


  > 45 mmol/L


(21-32) 


  


 


 


Anion Gap   (6-14)   


 


Blood Urea Nitrogen


  


  17 mg/dL


(7-20) 


  


 


 


Creatinine


  


  1.0 mg/dL


(0.6-1.0) 


  


 


 


Estimated GFR


(Cockcroft-Gault) 


  52.8 


  


  


 


 


BUN/Creatinine Ratio  17 (6-20)   


 


Glucose Level


  


  90 mg/dL


(70-99) 


  


 


 


Calcium Level


  


  8.3 mg/dL


(8.5-10.1) 


  


 


 


Total Bilirubin


  


  0.6 mg/dL


(0.2-1.0) 


  


 


 


Aspartate Amino Transf


(AST/SGOT) 


  15 U/L (15-37) 


  


  


 


 


Alanine Aminotransferase


(ALT/SGPT) 


  13 U/L (14-59) 


  


  


 


 


Alkaline Phosphatase


  


  50 U/L


() 


  


 


 


Creatine Kinase


  


  16 U/L


() 


  


 


 


Troponin I Quantitative


  


  0.018 ng/mL


(0.000-0.055) 


  


 


 


NT-Pro-B-Type Natriuretic


Peptide 


  1090 pg/mL


(0-449) 


  


 


 


Total Protein


  


  5.6 g/dL


(6.4-8.2) 


  


 


 


Albumin


  


  2.6 g/dL


(3.4-5.0) 


  


 


 


Albumin/Globulin Ratio  0.9 (1.0-1.7)   


 


Vitamin B12 Level


  


  778 pg/mL


(247-911) 


  


 


 


Thyroid Stimulating Hormone


(TSH) 


  0.256 uIU/mL


(0.358-3.74) 


  


 


 


Free Thyroxine


  


  1.43 ng/dL


(0.76-1.46) 


  


 


 


Free Triiodothyronine (T3)


pg/mL 


  1.95 pg/mL


(2.18-3.98) 


  


 


 


Digoxin Level


  


  0.8 ng/mL


(0.9-2.0) 


  


 


 


Digoxin Last Dose Date  Unknown   


 


Digoxin Last Dose Time  Unknown   


 


O2 Saturation   98 % (92-99)  94 % (92-99) 


 


Arterial Blood pH


  


  


  7.33


(7.35-7.45) 7.44


(7.35-7.45)


 


Arterial Blood pCO2 at


Patient Temp 


  


  74 mmHg


(35-46) 62 mmHg


(35-46)


 


Arterial Blood pO2 at Patient


Temp 


  


  123 mmHg


() 72 mmHg


()


 


Arterial Blood HCO3


  


  


  38 mmol/L


(21-28) 41 mmol/L


(21-28)


 


Arterial Blood Base Excess


  


  


  10 mmol/L


(-3-3) 15 mmol/L


(-3-3)


 


Oxyhemoglobin   97.2 %  93.4 % 


 


Methemoglobin


  


  


  0.4 %


(0.0-1.9) 0.4 %


(0.0-1.9)


 


Carbon Monoxide, Quantitative


  


  


  0.1 %


(0.0-1.9) 0.2 %


(0.0-1.9)


 


FiO2   38  30 








Medications





Active Scripts








 Medications  Dose


 Route/Sig


 Max Daily Dose Days Date Category


 


 Cefpodoxime


 Proxetil 100 Mg


 Tablet  100 Mg


 PO BID


   3 5/18/17 Rx


 


 Promethazine Hcl


 25 Mg Supp.rect  25 Mg


 RC Q6H PRN


    3/10/17 Rx


 


 Zofran Odt


  (Ondansetron) 4


 Mg Tab.rapdis  1 Tab


 SL Q8HRS


    3/5/17 Rx


 


 Duoneb 0.5-3(2.5)


 Mg/3 Ml


  (Albuterol/Ipratropium)


 3 Ml Ampul.neb  3 Ml


 NEB QID


    3/1/17 Reported


 


 Vitamin B-12


  (Cyanocobalamin


  (Vitamin B-12))


 1,000 Mcg Tablet  1 Tab


 PO DAILY


    1/19/17 Rx


 


 Senna


  (Sennosides) 8.6


 Mg Tablet  8.6 Mg


 PO DAILY


    9/14/16 Rx


 


 Docusate Sodium


 100 Mg Capsule  1 Cap


 PO DAILY


    9/14/16 Rx


 


 Trazodone Hcl 100


 Mg Tablet  1 Tab


 PO QHS


    9/1/16 Reported


 


 Vitamin D


  (Cholecalciferol


  (Vitamin D3))


 1,000 Unit Capsule  1 Cap


 PO DAILY


    9/1/16 Reported


 


 Percocet 


 Mg Tablet


  (Oxycodone/Acetaminophen)


 1 Each Tablet  1 Tab


 PO TID PRN


    6/8/16 Rx


 


 Cranberry


  (Cranberry Fruit)


 400 Mg Tablet  4,200 Mg


 PO DAILY


    6/1/16 Reported


 


 Levothyroxine


 Sodium 88 Mcg


 Tablet  1 Tab


 PO DAILY


    5/31/16 Reported


 


 Alprazolam 0.25


 Mg Tablet  1 Tab


 PO PRN TID PRN


    5/31/16 Reported


 


 Digoxin 125 Mcg


 Tablet  1 Tab


 PO DAILY


    6/1/15 Reported


 


 Zofran Odt


  (Ondansetron) 4


 Mg Tab.rapdis  1 Tab


 SL Q8HRS PRN


    6/1/15 Reported


 


 Escitalopram


 Oxalate 10 Mg


 Tablet  20 Mg


 PO DAILY


    6/8/14 Reported











Impression


.


FULL NOTE DICTATED


A/C HYPERCAPNIA


IMPROVED WITH BIPAP


DO NOT INCREASE 02 GREATER THAN 2 LITERS


PRN BIPAP











JULIO CESAR KAPADIA MD Jun 12, 2017 17:43

## 2017-06-12 NOTE — HP
ADMIT DATE:  2017



REASON FOR ADMISSION TO THE HOSPITAL:  Change in mental status, right-sided

weakness, and possible CVA.



HISTORY OF PRESENT ILLNESS:  The patient is an 84-year-old female patient known

to me.  She has a history of paroxysmal atrial fibrillation, has COPD, lung

cancer, hypothyroidism, and emphysema.  The patient actually was at home, and

she was noticed to be on the floor out of the bed, not known what time.  The

last time she was seen was last night.  She stays with her daughter, and the

patient was weak on the right side, was admitted for possible CVA.  CT head was

negative for bleed.  CT of the neck was negative for any fracture.  The patient

has history of multiple falls, osteoporosis, not a good candidate for

anticoagulation for atrial fibrillation.



PAST SURGICAL HISTORY:  She has history of lung cancer, had radiation treatment,

appendectomy, gallbladder surgery, , hysterectomy, tonsillectomy with

tympanoplasty, multiple compression fractures, back surgery x2, and hernia

repair x3.



ALLERGIES:  ADHESIVES, ASPIRIN, AND MORPHINE.



MEDICATIONS:  She is on home oxygen 5 liters.  She is on Xanax 0.25 three times

daily.  Vantin 100 mg twice a day, B12 1000 mcg daily, digoxin 125 mcg daily,

Colace 100 mg daily, Lexapro 20 mg daily, DuoNeb 4 times daily, levothyroxine 88

mcg daily, Questran p.r.n., oxycodone 10/325 q. 6h., prednisone 10 mg daily,

senna one daily, and trazodone 100 mg daily.



PERSONAL HISTORY:  She smoked for many years.  She quit recently, one pack at

least.  Denies alcohol or street drugs.  The patient is on oxygen.  She has a

daughter who is her caregiver.  She also has a neighbor who is a nurse, who

helps her.



REVIEW OF SYSTEMS:  The patient is on BiPAP now able to say a few words.



PHYSICAL EXAMINATION:

VITAL SIGNS:  At the time of admission, temperature 98, pulse 67, respirations

16, blood pressure 132/62, and 100% on 5 liters.

HEENT:  Head is atraumatic.  Pupils equal.  Oral cavity:  Dentures.

NECK:  Supple.  Thyroid not enlarged, JVD not elevated.

CHEST:  COPD pattern.

CARDIOVASCULAR:  S1, S2.

LUNGS:  Diminished breath sounds in the lungs.  Occasional wheezing.

ABDOMEN:  Soft, no mass palpable.

EXTERNAL GENITALIA:  No Gusman.

RECTAL:  Deferred.

EXTREMITIES:  No calf tenderness, no edema.

NEUROLOGIC:  The patient is able to make a few conversation, few words,

recognizes my name.  The patient is weak on the right upper extremity 4/5; 5/5

on the left side.  The right side is weaker.  The patient is able to lift the

left leg off the bed, but the right leg she is not able to move and significant

weakness.



LABORATORY DATA:  Shows a white count of 5, hemoglobin 8.5, and platelets 176. 

Electrolytes show sodium 143, potassium 4.1, chloride 100, bicarbonate 45, BUN

17, creatinine 1.0, and glucose 90.  LFTs are normal.  TSH is 0.2.  INR is 0.9. 

Urine shows large leukocyte esterase, more than 40 wbc.  Digoxin level was 0.8.



She had a chest x-ray:  COPD.  CT head was negative for acute bleed.  CT

cervical spine, no fracture.  X-ray of the humerus negative for fracture.



FINAL IMPRESSION:

1.  Possible cerebrovascular accident, right-sided weakness.

2.  Fall at home from bed.

3.  Chronic obstructive pulmonary disease, end-stage, on 5 liters oxygen.

4.  Paroxysmal atrial fibrillation.

5.  Osteoporosis.

6.  Anemia.

7.  History of lung cancer, had a radiation treatment 5 years ago.

8.  Urinary tract infection.  We will send UA and culture.  Start on Rocephin.



PLAN:  At this time, admit to hospital.  CT head was done.  Neurology was

consulted.  Speech, PT, OT, and Rehab.



The patient is a DNR.

 



______________________________

SUMMER TABOR MD



DR:  RAGINI/aidan  JOB#:  [id number] / [id number]

DD:  2017 10:47  DT:  2017 13:34

[place]

## 2017-06-12 NOTE — RAD
AP and lateral left humerus radiographs 6/12/2017



Clinical history: Fall earlier today with bruising to the distal left arm.



AP and lateral digital radiographs of the left humerus were obtained. There is

diffuse osteopenia of the visualized bony structures. No fracture or

dislocation of the left humerus is seen. Moderate degenerative changes are

seen involving the left glenohumeral joint and left AC joint. Mild to moderate

degenerative changes are seen involving the left elbow.



Impression: No fracture or dislocation of the left humerus is seen.

## 2017-06-12 NOTE — PDOC2
NEUROLOGY CONSULT


Date of Admission


Date of Admission


DATE: 17 


TIME: 17:29





Reason for Consult


Reason for Consult:


IMPRESSION:


Metabolic encephalopathy.


Fall


UTI


AFib


COPD


Hypothyroidism


Lung cancer.


SOB





RECOMMENDATIONS/PLAN:


Treat medical diseases.


Lab: see orders.


HCT no acute findings.





HISTORY OF THE PRESENT ILLNESS:


84-y-old female patient had a fall likely due to found on the floor from bed. 

She had mental status changes and was unable to recall the events. She was 

brought to the ER of [facility] for furtehr evaluation. She did not have focalized 

sensory or motor deficits noted.





PAST SURGICAL HISTORY:  


Lung cancer received radiation therapy.





PAST SURGERY HISTORY: 


Appendectomy, gallbladder surgery, , hysterectomy, tonsillectomy with


tympanoplasty, multiple compression fractures, back surgery x2, and hernia


repair x3.





ALLERGIES: 


ADHESIVES, ASPIRIN, AND MORPHINE.





PERSONAL HISTORY:  


She smoked for many years and quit recently, one pack at


least.  Denies alcohol or street drugs.





MEDICATIONS:


Refer to MAR





FAMILY HISTORY: 


Non contributory.





REVIEW OF SYSTEMS:


Constitutional: No malnutrition, weight loss, cachexia.


Head: No traumatic brain or head injury.


Skin: No edema, or rash.


Ear: No infection.


Eyes: No vision loss or color blindness.


Nose: No bleeding or purulent discharges.


Hearing: Hearing decrease.


Neck: No injury.


Breast: No history of cancer, masses,or discharges.


Cardiac: No MI, arrhythmia.


Pulmonary: COPD, lung cancer.


GI: No GI ulcer, GI bleeding.


Urinary/genital: UTI.


Endocrinologic: No cousin face, craniofacial dysmorphism, polydactyly.


Skeletomuscular: No muscular atrophy, deformity.


Neurological: see  HP.


Psychiatric: Denies drug use/abuse.


Otherwise, not pertinant14-point review of systems.





PHYSICAL EXAMINATION:   


General appearance is in no acute distress.  


HEENT:  Normocephalic and nontraumatic.  Eyes, nose, ears, and throat are 

unremarkable.  


Neck is supple. No lymphadenopathy. No crepitus. 


Cardiovascular:  S1, S2, regular rate and rhythm.  


Pulmonary:  Breathing sounds decreased to auscultation bilaterally. 


Abdomen:  Bowel sounds are positive.  


Extremities:  No rash, lesions, or edema.  


No restriction of range of motion





NEUROLOGICAL  EXAMINATION:


Awake.


Oriented to place and person.


PERRL.


EOMI.


CN: no focal findings.


Muscle tone: within normal.


Muscle strength: 4


DTR: 2-


Plantar reflex: Neutral response bilaterally 


Gait: not examined in bed. 


Sensory exam: no abnormal findings.


No acute cerebellar signs elicited.


F-T-N test fine.





Current Medications


Current Medications





Current Medications


Ceftriaxone Sodium 50 ml @  100 mls/hr 1X  ONCE IV  Last administered on  08:30;  Start 17 at 08:30;  Stop 17 at 08:59;  Status DC


Sodium Chloride 1,000 ml @  75 mls/hr [id number] IV  Last administered on  09:10;  Start 17 at 09:10;  Stop 17 at 09:09


Ceftriaxone Sodium 1 gm/ Sodium Chloride 50 ml @  100 mls/hr Q24H IV ;  Start  at 11:00


Dextrose/Sodium Chloride 1,000 ml @  75 mls/hr [id number] IV  Last administered on 

 10:45;  Start 17 at 10:45





Active Scripts


Active


Cefpodoxime Proxetil 100 Mg Tablet 100 Mg PO BID 3 Days


Promethazine Hcl 25 Mg Supp.rect 25 Mg RC Q6H PRN


Zofran Odt (Ondansetron) 4 Mg Tab.rapdis 1 Tab SL Q8HRS


Vitamin B-12 (Cyanocobalamin (Vitamin B-12)) 1,000 Mcg Tablet 1 Tab PO DAILY


Senna (Sennosides) 8.6 Mg Tablet 8.6 Mg PO DAILY


Docusate Sodium 100 Mg Capsule 1 Cap PO DAILY


Percocet  Mg Tablet (Oxycodone/Acetaminophen) 1 Each Tablet 1 Tab PO TID 

PRN


Reported


Duoneb 0.5-3(2.5) Mg/3 Ml (Albuterol/Ipratropium) 3 Ml Ampul.neb 3 Ml NEB QID


Trazodone Hcl 100 Mg Tablet 1 Tab PO QHS


Vitamin D (Cholecalciferol (Vitamin D3)) 1,000 Unit Capsule 1 Cap PO DAILY


Cranberry (Cranberry Fruit) 400 Mg Tablet 4,200 Mg PO DAILY


Levothyroxine Sodium 88 Mcg Tablet 1 Tab PO DAILY


Alprazolam 0.25 Mg Tablet 1 Tab PO PRN TID PRN


Digoxin 125 Mcg Tablet 1 Tab PO DAILY


Zofran Odt (Ondansetron) 4 Mg Tab.rapdis 1 Tab SL Q8HRS PRN


Escitalopram Oxalate 10 Mg Tablet 20 Mg PO DAILY





Allergies


Allergies:  


Coded Allergies:  


     morphine (Verified  Allergy, Intermediate, PER DAUGHTER, DOES NOT AGREE 

WITH PT, 14)


     adhesive (Verified  Adverse Reaction, Intermediate, blisters, 4/22/15)


     aspirin (Verified  Adverse Reaction, Intermediate, Nausea and Vomiting, 

"makes me sick", 4/22/15)





Vitals


VITALS





Vital Signs








  Date Time  Temp Pulse Resp B/P (MAP) Pulse Ox O2 Delivery O2 Flow Rate FiO2


 


17 15:52     92 Nasal Cannula 2.0 


 


17 15:00 98.2 53 22 129/70 (89)    





 98.2       











Labs


Labs





Laboratory Tests








Test


  17


07:45 17


08:10 17


08:59 17


09:34


 


Urine Collection Type U cath    


 


Urine Color Straw    


 


Urine Clarity Cloudy    


 


Urine pH 7.0    


 


Urine Specific Gravity 1.015    


 


Urine Protein


  Negative mg/dL


(NEG-TRACE) 


  


  


 


 


Urine Glucose (UA)


  Negative mg/dL


(NEG) 


  


  


 


 


Urine Ketones (Stick)


  Negative mg/dL


(NEG) 


  


  


 


 


Urine Blood Small (NEG)    


 


Urine Nitrite Negative (NEG)    


 


Urine Bilirubin Negative (NEG)    


 


Urine Urobilinogen Dipstick


  0.2 mg/dL (0.2


mg/dL) 


  


  


 


 


Urine Leukocyte Esterase Large (NEG)    


 


Urine RBC 1-2 /HPF (0-2)    


 


Urine WBC >40 /HPF (0-4)    


 


Urine Squamous Epithelial


Cells Occ /LPF 


  


  


  


 


 


Urine Bacteria


  Few /HPF


(0-FEW) 


  


  


 


 


White Blood Count


  


  5.3 x10^3/uL


(4.0-11.0) 


  


 


 


Red Blood Count


  


  3.03 x10^6/uL


(3.50-5.40) 


  


 


 


Hemoglobin


  


  8.5 g/dL


(12.0-15.5) 


  


 


 


Hematocrit


  


  27.8 %


(36.0-47.0) 


  


 


 


Mean Corpuscular Volume  92 fL ()   


 


Mean Corpuscular Hemoglobin  28 pg (25-35)   


 


Mean Corpuscular Hemoglobin


Concent 


  31 g/dL


(31-37) 


  


 


 


Red Cell Distribution Width


  


  16.9 %


(11.5-14.5) 


  


 


 


Platelet Count


  


  176 x10^3/uL


(140-400) 


  


 


 


Neutrophils (%) (Auto)  52 % (31-73)   


 


Lymphocytes (%) (Auto)  31 % (24-48)   


 


Monocytes (%) (Auto)  15 % (0-9)   


 


Eosinophils (%) (Auto)  2 % (0-3)   


 


Basophils (%) (Auto)  0 % (0-3)   


 


Neutrophils # (Auto)


  


  2.8 x10^3uL


(1.8-7.7) 


  


 


 


Lymphocytes # (Auto)


  


  1.6 x10^3/uL


(1.0-4.8) 


  


 


 


Monocytes # (Auto)


  


  0.8 x10^3/uL


(0.0-1.1) 


  


 


 


Eosinophils # (Auto)


  


  0.1 x10^3/uL


(0.0-0.7) 


  


 


 


Basophils # (Auto)


  


  0.0 x10^3/uL


(0.0-0.2) 


  


 


 


Prothrombin Time


  


  11.7 SEC


(11.7-14.0) 


  


 


 


Prothromb Time International


Ratio 


  0.9 (0.8-1.1) 


  


  


 


 


Sodium Level


  


  143 mmol/L


(136-145) 


  


 


 


Potassium Level


  


  4.1 mmol/L


(3.5-5.1) 


  


 


 


Chloride Level


  


  100 mmol/L


() 


  


 


 


Carbon Dioxide Level


  


  > 45 mmol/L


(21-32) 


  


 


 


Anion Gap   (6-14)   


 


Blood Urea Nitrogen


  


  17 mg/dL


(7-20) 


  


 


 


Creatinine


  


  1.0 mg/dL


(0.6-1.0) 


  


 


 


Estimated GFR


(Cockcroft-Gault) 


  52.8 


  


  


 


 


BUN/Creatinine Ratio  17 (6-20)   


 


Glucose Level


  


  90 mg/dL


(70-99) 


  


 


 


Calcium Level


  


  8.3 mg/dL


(8.5-10.1) 


  


 


 


Total Bilirubin


  


  0.6 mg/dL


(0.2-1.0) 


  


 


 


Aspartate Amino Transf


(AST/SGOT) 


  15 U/L (15-37) 


  


  


 


 


Alanine Aminotransferase


(ALT/SGPT) 


  13 U/L (14-59) 


  


  


 


 


Alkaline Phosphatase


  


  50 U/L


() 


  


 


 


Creatine Kinase


  


  16 U/L


() 


  


 


 


Troponin I Quantitative


  


  0.018 ng/mL


(0.000-0.055) 


  


 


 


NT-Pro-B-Type Natriuretic


Peptide 


  1090 pg/mL


(0-449) 


  


 


 


Total Protein


  


  5.6 g/dL


(6.4-8.2) 


  


 


 


Albumin


  


  2.6 g/dL


(3.4-5.0) 


  


 


 


Albumin/Globulin Ratio  0.9 (1.0-1.7)   


 


Vitamin B12 Level


  


  778 pg/mL


(247-911) 


  


 


 


Thyroid Stimulating Hormone


(TSH) 


  0.256 uIU/mL


(0.358-3.74) 


  


 


 


Free Thyroxine


  


  1.43 ng/dL


(0.76-1.46) 


  


 


 


Free Triiodothyronine (T3)


pg/mL 


  1.95 pg/mL


(2.18-3.98) 


  


 


 


Digoxin Level


  


  0.8 ng/mL


(0.9-2.0) 


  


 


 


Digoxin Last Dose Date  Unknown   


 


Digoxin Last Dose Time  Unknown   


 


O2 Saturation   98 % (92-99)  94 % (92-99) 


 


Arterial Blood pH


  


  


  7.33


(7.35-7.45) 7.44


(7.35-7.45)


 


Arterial Blood pCO2 at


Patient Temp 


  


  74 mmHg


(35-46) 62 mmHg


(35-46)


 


Arterial Blood pO2 at Patient


Temp 


  


  123 mmHg


() 72 mmHg


()


 


Arterial Blood HCO3


  


  


  38 mmol/L


(21-28) 41 mmol/L


(21-28)


 


Arterial Blood Base Excess


  


  


  10 mmol/L


(-3-3) 15 mmol/L


(-3-3)


 


Oxyhemoglobin   97.2 %  93.4 % 


 


Methemoglobin


  


  


  0.4 %


(0.0-1.9) 0.4 %


(0.0-1.9)


 


Carbon Monoxide, Quantitative


  


  


  0.1 %


(0.0-1.9) 0.2 %


(0.0-1.9)


 


FiO2   38  30 








Laboratory Tests








Test


  17


07:45 17


08:10 17


08:59 17


09:34


 


Urine Collection Type U cath    


 


Urine Color Straw    


 


Urine Clarity Cloudy    


 


Urine pH 7.0    


 


Urine Specific Gravity 1.015    


 


Urine Protein


  Negative mg/dL


(NEG-TRACE) 


  


  


 


 


Urine Glucose (UA)


  Negative mg/dL


(NEG) 


  


  


 


 


Urine Ketones (Stick)


  Negative mg/dL


(NEG) 


  


  


 


 


Urine Blood Small (NEG)    


 


Urine Nitrite Negative (NEG)    


 


Urine Bilirubin Negative (NEG)    


 


Urine Urobilinogen Dipstick


  0.2 mg/dL (0.2


mg/dL) 


  


  


 


 


Urine Leukocyte Esterase Large (NEG)    


 


Urine RBC 1-2 /HPF (0-2)    


 


Urine WBC >40 /HPF (0-4)    


 


Urine Squamous Epithelial


Cells Occ /LPF 


  


  


  


 


 


Urine Bacteria


  Few /HPF


(0-FEW) 


  


  


 


 


White Blood Count


  


  5.3 x10^3/uL


(4.0-11.0) 


  


 


 


Red Blood Count


  


  3.03 x10^6/uL


(3.50-5.40) 


  


 


 


Hemoglobin


  


  8.5 g/dL


(12.0-15.5) 


  


 


 


Hematocrit


  


  27.8 %


(36.0-47.0) 


  


 


 


Mean Corpuscular Volume  92 fL ()   


 


Mean Corpuscular Hemoglobin  28 pg (25-35)   


 


Mean Corpuscular Hemoglobin


Concent 


  31 g/dL


(31-37) 


  


 


 


Red Cell Distribution Width


  


  16.9 %


(11.5-14.5) 


  


 


 


Platelet Count


  


  176 x10^3/uL


(140-400) 


  


 


 


Neutrophils (%) (Auto)  52 % (31-73)   


 


Lymphocytes (%) (Auto)  31 % (24-48)   


 


Monocytes (%) (Auto)  15 % (0-9)   


 


Eosinophils (%) (Auto)  2 % (0-3)   


 


Basophils (%) (Auto)  0 % (0-3)   


 


Neutrophils # (Auto)


  


  2.8 x10^3uL


(1.8-7.7) 


  


 


 


Lymphocytes # (Auto)


  


  1.6 x10^3/uL


(1.0-4.8) 


  


 


 


Monocytes # (Auto)


  


  0.8 x10^3/uL


(0.0-1.1) 


  


 


 


Eosinophils # (Auto)


  


  0.1 x10^3/uL


(0.0-0.7) 


  


 


 


Basophils # (Auto)


  


  0.0 x10^3/uL


(0.0-0.2) 


  


 


 


Prothrombin Time


  


  11.7 SEC


(11.7-14.0) 


  


 


 


Prothromb Time International


Ratio 


  0.9 (0.8-1.1) 


  


  


 


 


Sodium Level


  


  143 mmol/L


(136-145) 


  


 


 


Potassium Level


  


  4.1 mmol/L


(3.5-5.1) 


  


 


 


Chloride Level


  


  100 mmol/L


() 


  


 


 


Carbon Dioxide Level


  


  > 45 mmol/L


(21-32) 


  


 


 


Anion Gap   (6-14)   


 


Blood Urea Nitrogen


  


  17 mg/dL


(7-20) 


  


 


 


Creatinine


  


  1.0 mg/dL


(0.6-1.0) 


  


 


 


Estimated GFR


(Cockcroft-Gault) 


  52.8 


  


  


 


 


BUN/Creatinine Ratio  17 (6-20)   


 


Glucose Level


  


  90 mg/dL


(70-99) 


  


 


 


Calcium Level


  


  8.3 mg/dL


(8.5-10.1) 


  


 


 


Total Bilirubin


  


  0.6 mg/dL


(0.2-1.0) 


  


 


 


Aspartate Amino Transf


(AST/SGOT) 


  15 U/L (15-37) 


  


  


 


 


Alanine Aminotransferase


(ALT/SGPT) 


  13 U/L (14-59) 


  


  


 


 


Alkaline Phosphatase


  


  50 U/L


() 


  


 


 


Creatine Kinase


  


  16 U/L


() 


  


 


 


Troponin I Quantitative


  


  0.018 ng/mL


(0.000-0.055) 


  


 


 


NT-Pro-B-Type Natriuretic


Peptide 


  1090 pg/mL


(0-449) 


  


 


 


Total Protein


  


  5.6 g/dL


(6.4-8.2) 


  


 


 


Albumin


  


  2.6 g/dL


(3.4-5.0) 


  


 


 


Albumin/Globulin Ratio  0.9 (1.0-1.7)   


 


Vitamin B12 Level


  


  778 pg/mL


(247-911) 


  


 


 


Thyroid Stimulating Hormone


(TSH) 


  0.256 uIU/mL


(0.358-3.74) 


  


 


 


Free Thyroxine


  


  1.43 ng/dL


(0.76-1.46) 


  


 


 


Free Triiodothyronine (T3)


pg/mL 


  1.95 pg/mL


(2.18-3.98) 


  


 


 


Digoxin Level


  


  0.8 ng/mL


(0.9-2.0) 


  


 


 


Digoxin Last Dose Date  Unknown   


 


Digoxin Last Dose Time  Unknown   


 


O2 Saturation   98 % (92-99)  94 % (92-99) 


 


Arterial Blood pH


  


  


  7.33


(7.35-7.45) 7.44


(7.35-7.45)


 


Arterial Blood pCO2 at


Patient Temp 


  


  74 mmHg


(35-46) 62 mmHg


(35-46)


 


Arterial Blood pO2 at Patient


Temp 


  


  123 mmHg


() 72 mmHg


()


 


Arterial Blood HCO3


  


  


  38 mmol/L


(21-28) 41 mmol/L


(21-28)


 


Arterial Blood Base Excess


  


  


  10 mmol/L


(-3-3) 15 mmol/L


(-3-3)


 


Oxyhemoglobin   97.2 %  93.4 % 


 


Methemoglobin


  


  


  0.4 %


(0.0-1.9) 0.4 %


(0.0-1.9)


 


Carbon Monoxide, Quantitative


  


  


  0.1 %


(0.0-1.9) 0.2 %


(0.0-1.9)


 


FiO2   38  30 

















DONNA GALVAN MD 2017 17:40

## 2017-06-13 VITALS — SYSTOLIC BLOOD PRESSURE: 125 MMHG | DIASTOLIC BLOOD PRESSURE: 60 MMHG

## 2017-06-13 VITALS — SYSTOLIC BLOOD PRESSURE: 118 MMHG | DIASTOLIC BLOOD PRESSURE: 67 MMHG

## 2017-06-13 VITALS — SYSTOLIC BLOOD PRESSURE: 115 MMHG | DIASTOLIC BLOOD PRESSURE: 56 MMHG

## 2017-06-13 LAB
ANION GAP SERPL CALC-SCNC: (no result) MMOL/L (ref 6–14)
BASOPHILS # BLD AUTO: 0 X10^3/UL (ref 0–0.2)
BASOPHILS NFR BLD: 0 % (ref 0–3)
BUN SERPL-MCNC: 16 MG/DL (ref 7–20)
CALCIUM SERPL-MCNC: 8.5 MG/DL (ref 8.5–10.1)
CHLORIDE SERPL-SCNC: 101 MMOL/L (ref 98–107)
CHOLEST SERPL-MCNC: 207 MG/DL (ref 0–200)
CHOLEST/HDLC SERPL: 2.3 {RATIO}
CO2 SERPL-SCNC: 43 MMOL/L (ref 21–32)
CREAT SERPL-MCNC: 0.9 MG/DL (ref 0.6–1)
EOSINOPHIL NFR BLD: 2 % (ref 0–3)
ERYTHROCYTE [DISTWIDTH] IN BLOOD BY AUTOMATED COUNT: 17.1 % (ref 11.5–14.5)
GFR SERPLBLD BASED ON 1.73 SQ M-ARVRAT: 59.7 ML/MIN
GLUCOSE SERPL-MCNC: 89 MG/DL (ref 70–99)
HCT VFR BLD CALC: 28 % (ref 36–47)
HDLC SERPL-MCNC: 89 MG/DL (ref 40–60)
HGB BLD-MCNC: 8.6 G/DL (ref 12–15.5)
LYMPHOCYTES # BLD: 1.8 X10^3/UL (ref 1–4.8)
LYMPHOCYTES NFR BLD AUTO: 31 % (ref 24–48)
MCH RBC QN AUTO: 28 PG (ref 25–35)
MCHC RBC AUTO-ENTMCNC: 31 G/DL (ref 31–37)
MCV RBC AUTO: 91 FL (ref 79–100)
MONOCYTES NFR BLD: 15 % (ref 0–9)
NEUTROPHILS NFR BLD AUTO: 53 % (ref 31–73)
NONHDLC SERPL-MCNC: 118 MG/DL (ref 0–129)
PLATELET # BLD AUTO: 121 X10^3/UL (ref 140–400)
POTASSIUM SERPL-SCNC: 4.4 MMOL/L (ref 3.5–5.1)
RBC # BLD AUTO: 3.07 X10^6/UL (ref 3.5–5.4)
SODIUM SERPL-SCNC: 143 MMOL/L (ref 136–145)
TRIGL SERPL-MCNC: 92 MG/DL (ref 0–150)
WBC # BLD AUTO: 5.8 X10^3/UL (ref 4–11)

## 2017-06-13 RX ADMIN — IPRATROPIUM BROMIDE AND ALBUTEROL SULFATE SCH ML: .5; 3 SOLUTION RESPIRATORY (INHALATION) at 23:48

## 2017-06-13 RX ADMIN — ONDANSETRON SCH MG: 4 TABLET, ORALLY DISINTEGRATING ORAL at 06:04

## 2017-06-13 RX ADMIN — ESCITALOPRAM OXALATE SCH MG: 10 TABLET ORAL at 10:10

## 2017-06-13 RX ADMIN — DEXTROSE AND SODIUM CHLORIDE SCH MLS/HR: 5; .9 INJECTION, SOLUTION INTRAVENOUS at 13:25

## 2017-06-13 RX ADMIN — DEXTROSE AND SODIUM CHLORIDE SCH MLS/HR: 5; .9 INJECTION, SOLUTION INTRAVENOUS at 00:29

## 2017-06-13 RX ADMIN — VITAMIN D, TAB 1000IU (100/BT) SCH UNIT: 25 TAB at 10:10

## 2017-06-13 RX ADMIN — SENNOSIDES A AND B SCH MG: 8.6 TABLET, FILM COATED ORAL at 10:09

## 2017-06-13 RX ADMIN — IPRATROPIUM BROMIDE AND ALBUTEROL SULFATE SCH ML: .5; 3 SOLUTION RESPIRATORY (INHALATION) at 04:00

## 2017-06-13 RX ADMIN — DIGOXIN SCH MCG: 125 TABLET ORAL at 10:09

## 2017-06-13 RX ADMIN — TRAZODONE HYDROCHLORIDE SCH MG: 100 TABLET ORAL at 21:23

## 2017-06-13 RX ADMIN — IPRATROPIUM BROMIDE AND ALBUTEROL SULFATE SCH ML: .5; 3 SOLUTION RESPIRATORY (INHALATION) at 11:48

## 2017-06-13 RX ADMIN — LEVOTHYROXINE SODIUM SCH MCG: 88 TABLET ORAL at 06:03

## 2017-06-13 RX ADMIN — IPRATROPIUM BROMIDE AND ALBUTEROL SULFATE SCH ML: .5; 3 SOLUTION RESPIRATORY (INHALATION) at 20:42

## 2017-06-13 RX ADMIN — DOCUSATE SODIUM SCH MG: 100 CAPSULE, LIQUID FILLED ORAL at 10:10

## 2017-06-13 RX ADMIN — ATORVASTATIN CALCIUM SCH MG: 10 TABLET, FILM COATED ORAL at 21:24

## 2017-06-13 RX ADMIN — CYANOCOBALAMIN TAB 1000 MCG SCH MCG: 1000 TAB at 10:09

## 2017-06-13 RX ADMIN — IPRATROPIUM BROMIDE AND ALBUTEROL SULFATE SCH ML: .5; 3 SOLUTION RESPIRATORY (INHALATION) at 07:21

## 2017-06-13 RX ADMIN — OXYCODONE HYDROCHLORIDE AND ACETAMINOPHEN PRN TAB: 10; 325 TABLET ORAL at 21:24

## 2017-06-13 RX ADMIN — OXYCODONE HYDROCHLORIDE AND ACETAMINOPHEN PRN TAB: 10; 325 TABLET ORAL at 10:09

## 2017-06-13 RX ADMIN — IPRATROPIUM BROMIDE AND ALBUTEROL SULFATE SCH ML: .5; 3 SOLUTION RESPIRATORY (INHALATION) at 16:25

## 2017-06-13 NOTE — PDOC
PULMONARY PROGRESS NOTES


Subjective


feels better this am


Vitals





Vital Signs








  Date Time  Temp Pulse Resp B/P (MAP) Pulse Ox O2 Delivery O2 Flow Rate FiO2


 


6/13/17 07:23     90 Nasal Cannula 2.0 


 


6/13/17 07:00 98.5 79 20 125/60 (81)    





 98.5       








General:  Alert, No acute distress


HEENT:  Other


Lungs:  Other (decrease bs)


Cardiovascular:  S1, S2


Abdomen:  Soft, Non-tender


Neuro Exam:  Alert


Extremities:  No Edema


Skin:  Warm


Labs





Laboratory Tests








Test


  6/12/17


07:45 6/12/17


08:10 6/12/17


08:59 6/12/17


09:34


 


Urine Collection Type U cath    


 


Urine Color Straw    


 


Urine Clarity Cloudy    


 


Urine pH 7.0    


 


Urine Specific Gravity 1.015    


 


Urine Protein


  Negative mg/dL


(NEG-TRACE) 


  


  


 


 


Urine Glucose (UA)


  Negative mg/dL


(NEG) 


  


  


 


 


Urine Ketones (Stick)


  Negative mg/dL


(NEG) 


  


  


 


 


Urine Blood Small (NEG)    


 


Urine Nitrite Negative (NEG)    


 


Urine Bilirubin Negative (NEG)    


 


Urine Urobilinogen Dipstick


  0.2 mg/dL (0.2


mg/dL) 


  


  


 


 


Urine Leukocyte Esterase Large (NEG)    


 


Urine RBC 1-2 /HPF (0-2)    


 


Urine WBC >40 /HPF (0-4)    


 


Urine Squamous Epithelial


Cells Occ /LPF 


  


  


  


 


 


Urine Bacteria


  Few /HPF


(0-FEW) 


  


  


 


 


White Blood Count


  


  5.3 x10^3/uL


(4.0-11.0) 


  


 


 


Red Blood Count


  


  3.03 x10^6/uL


(3.50-5.40) 


  


 


 


Hemoglobin


  


  8.5 g/dL


(12.0-15.5) 


  


 


 


Hematocrit


  


  27.8 %


(36.0-47.0) 


  


 


 


Mean Corpuscular Volume  92 fL ()   


 


Mean Corpuscular Hemoglobin  28 pg (25-35)   


 


Mean Corpuscular Hemoglobin


Concent 


  31 g/dL


(31-37) 


  


 


 


Red Cell Distribution Width


  


  16.9 %


(11.5-14.5) 


  


 


 


Platelet Count


  


  176 x10^3/uL


(140-400) 


  


 


 


Neutrophils (%) (Auto)  52 % (31-73)   


 


Lymphocytes (%) (Auto)  31 % (24-48)   


 


Monocytes (%) (Auto)  15 % (0-9)   


 


Eosinophils (%) (Auto)  2 % (0-3)   


 


Basophils (%) (Auto)  0 % (0-3)   


 


Neutrophils # (Auto)


  


  2.8 x10^3uL


(1.8-7.7) 


  


 


 


Lymphocytes # (Auto)


  


  1.6 x10^3/uL


(1.0-4.8) 


  


 


 


Monocytes # (Auto)


  


  0.8 x10^3/uL


(0.0-1.1) 


  


 


 


Eosinophils # (Auto)


  


  0.1 x10^3/uL


(0.0-0.7) 


  


 


 


Basophils # (Auto)


  


  0.0 x10^3/uL


(0.0-0.2) 


  


 


 


Prothrombin Time


  


  11.7 SEC


(11.7-14.0) 


  


 


 


Prothromb Time International


Ratio 


  0.9 (0.8-1.1) 


  


  


 


 


Sodium Level


  


  143 mmol/L


(136-145) 


  


 


 


Potassium Level


  


  4.1 mmol/L


(3.5-5.1) 


  


 


 


Chloride Level


  


  100 mmol/L


() 


  


 


 


Carbon Dioxide Level


  


  > 45 mmol/L


(21-32) 


  


 


 


Anion Gap   (6-14)   


 


Blood Urea Nitrogen


  


  17 mg/dL


(7-20) 


  


 


 


Creatinine


  


  1.0 mg/dL


(0.6-1.0) 


  


 


 


Estimated GFR


(Cockcroft-Gault) 


  52.8 


  


  


 


 


BUN/Creatinine Ratio  17 (6-20)   


 


Glucose Level


  


  90 mg/dL


(70-99) 


  


 


 


Calcium Level


  


  8.3 mg/dL


(8.5-10.1) 


  


 


 


Total Bilirubin


  


  0.6 mg/dL


(0.2-1.0) 


  


 


 


Aspartate Amino Transf


(AST/SGOT) 


  15 U/L (15-37) 


  


  


 


 


Alanine Aminotransferase


(ALT/SGPT) 


  13 U/L (14-59) 


  


  


 


 


Alkaline Phosphatase


  


  50 U/L


() 


  


 


 


Creatine Kinase


  


  16 U/L


() 


  


 


 


Troponin I Quantitative


  


  0.018 ng/mL


(0.000-0.055) 


  


 


 


NT-Pro-B-Type Natriuretic


Peptide 


  1090 pg/mL


(0-449) 


  


 


 


Total Protein


  


  5.6 g/dL


(6.4-8.2) 


  


 


 


Albumin


  


  2.6 g/dL


(3.4-5.0) 


  


 


 


Albumin/Globulin Ratio  0.9 (1.0-1.7)   


 


Vitamin B12 Level


  


  778 pg/mL


(247-911) 


  


 


 


Thyroid Stimulating Hormone


(TSH) 


  0.256 uIU/mL


(0.358-3.74) 


  


 


 


Free Thyroxine


  


  1.43 ng/dL


(0.76-1.46) 


  


 


 


Free Triiodothyronine (T3)


pg/mL 


  1.95 pg/mL


(2.18-3.98) 


  


 


 


Digoxin Level


  


  0.8 ng/mL


(0.9-2.0) 


  


 


 


Digoxin Last Dose Date  Unknown   


 


Digoxin Last Dose Time  Unknown   


 


O2 Saturation   98 % (92-99)  94 % (92-99) 


 


Arterial Blood pH


  


  


  7.33


(7.35-7.45) 7.44


(7.35-7.45)


 


Arterial Blood pCO2 at


Patient Temp 


  


  74 mmHg


(35-46) 62 mmHg


(35-46)


 


Arterial Blood pO2 at Patient


Temp 


  


  123 mmHg


() 72 mmHg


()


 


Arterial Blood HCO3


  


  


  38 mmol/L


(21-28) 41 mmol/L


(21-28)


 


Arterial Blood Base Excess


  


  


  10 mmol/L


(-3-3) 15 mmol/L


(-3-3)


 


Oxyhemoglobin   97.2 %  93.4 % 


 


Methemoglobin


  


  


  0.4 %


(0.0-1.9) 0.4 %


(0.0-1.9)


 


Carbon Monoxide, Quantitative


  


  


  0.1 %


(0.0-1.9) 0.2 %


(0.0-1.9)


 


FiO2   38  30 


 


Test


  6/13/17


03:25 


  


  


 


 


White Blood Count


  5.8 x10^3/uL


(4.0-11.0) 


  


  


 


 


Red Blood Count


  3.07 x10^6/uL


(3.50-5.40) 


  


  


 


 


Hemoglobin


  8.6 g/dL


(12.0-15.5) 


  


  


 


 


Hematocrit


  28.0 %


(36.0-47.0) 


  


  


 


 


Mean Corpuscular Volume 91 fL ()    


 


Mean Corpuscular Hemoglobin 28 pg (25-35)    


 


Mean Corpuscular Hemoglobin


Concent 31 g/dL


(31-37) 


  


  


 


 


Red Cell Distribution Width


  17.1 %


(11.5-14.5) 


  


  


 


 


Platelet Count


  121 x10^3/uL


(140-400) 


  


  


 


 


Neutrophils (%) (Auto) 53 % (31-73)    


 


Lymphocytes (%) (Auto) 31 % (24-48)    


 


Monocytes (%) (Auto) 15 % (0-9)    


 


Eosinophils (%) (Auto) 2 % (0-3)    


 


Basophils (%) (Auto) 0 % (0-3)    


 


Neutrophils # (Auto)


  3.0 x10^3uL


(1.8-7.7) 


  


  


 


 


Lymphocytes # (Auto)


  1.8 x10^3/uL


(1.0-4.8) 


  


  


 


 


Monocytes # (Auto)


  0.9 x10^3/uL


(0.0-1.1) 


  


  


 


 


Eosinophils # (Auto)


  0.1 x10^3/uL


(0.0-0.7) 


  


  


 


 


Basophils # (Auto)


  0.0 x10^3/uL


(0.0-0.2) 


  


  


 


 


Sodium Level


  143 mmol/L


(136-145) 


  


  


 


 


Potassium Level


  4.4 mmol/L


(3.5-5.1) 


  


  


 


 


Chloride Level


  101 mmol/L


() 


  


  


 


 


Carbon Dioxide Level


  43 mmol/L


(21-32) 


  


  


 


 


Anion Gap  (6-14)    


 


Blood Urea Nitrogen


  16 mg/dL


(7-20) 


  


  


 


 


Creatinine


  0.9 mg/dL


(0.6-1.0) 


  


  


 


 


Estimated GFR


(Cockcroft-Gault) 59.7 


  


  


  


 


 


Glucose Level


  89 mg/dL


(70-99) 


  


  


 


 


Calcium Level


  8.5 mg/dL


(8.5-10.1) 


  


  


 


 


Triglycerides Level


  92 mg/dL


(0-150) 


  


  


 


 


Cholesterol Level


  207 mg/dL


(0-200) 


  


  


 


 


LDL Cholesterol, Calculated


  100 mg/dL


(0-100) 


  


  


 


 


VLDL Cholesterol, Calculated


  18 mg/dL


(0-40) 


  


  


 


 


Non-HDL Cholesterol Calculated


  118 mg/dL


(0-129) 


  


  


 


 


HDL Cholesterol


  89 mg/dL


(40-60) 


  


  


 


 


Cholesterol/HDL Ratio 2.3    








Laboratory Tests








Test


  6/12/17


08:59 6/12/17


09:34 6/13/17


03:25


 


O2 Saturation 98 % (92-99)  94 % (92-99)  


 


Arterial Blood pH


  7.33


(7.35-7.45) 7.44


(7.35-7.45) 


 


 


Arterial Blood pCO2 at


Patient Temp 74 mmHg


(35-46) 62 mmHg


(35-46) 


 


 


Arterial Blood pO2 at Patient


Temp 123 mmHg


() 72 mmHg


() 


 


 


Arterial Blood HCO3


  38 mmol/L


(21-28) 41 mmol/L


(21-28) 


 


 


Arterial Blood Base Excess


  10 mmol/L


(-3-3) 15 mmol/L


(-3-3) 


 


 


Oxyhemoglobin 97.2 %  93.4 %  


 


Methemoglobin


  0.4 %


(0.0-1.9) 0.4 %


(0.0-1.9) 


 


 


Carbon Monoxide, Quantitative


  0.1 %


(0.0-1.9) 0.2 %


(0.0-1.9) 


 


 


FiO2 38  30  


 


White Blood Count


  


  


  5.8 x10^3/uL


(4.0-11.0)


 


Red Blood Count


  


  


  3.07 x10^6/uL


(3.50-5.40)


 


Hemoglobin


  


  


  8.6 g/dL


(12.0-15.5)


 


Hematocrit


  


  


  28.0 %


(36.0-47.0)


 


Mean Corpuscular Volume   91 fL () 


 


Mean Corpuscular Hemoglobin   28 pg (25-35) 


 


Mean Corpuscular Hemoglobin


Concent 


  


  31 g/dL


(31-37)


 


Red Cell Distribution Width


  


  


  17.1 %


(11.5-14.5)


 


Platelet Count


  


  


  121 x10^3/uL


(140-400)


 


Neutrophils (%) (Auto)   53 % (31-73) 


 


Lymphocytes (%) (Auto)   31 % (24-48) 


 


Monocytes (%) (Auto)   15 % (0-9) 


 


Eosinophils (%) (Auto)   2 % (0-3) 


 


Basophils (%) (Auto)   0 % (0-3) 


 


Neutrophils # (Auto)


  


  


  3.0 x10^3uL


(1.8-7.7)


 


Lymphocytes # (Auto)


  


  


  1.8 x10^3/uL


(1.0-4.8)


 


Monocytes # (Auto)


  


  


  0.9 x10^3/uL


(0.0-1.1)


 


Eosinophils # (Auto)


  


  


  0.1 x10^3/uL


(0.0-0.7)


 


Basophils # (Auto)


  


  


  0.0 x10^3/uL


(0.0-0.2)


 


Sodium Level


  


  


  143 mmol/L


(136-145)


 


Potassium Level


  


  


  4.4 mmol/L


(3.5-5.1)


 


Chloride Level


  


  


  101 mmol/L


()


 


Carbon Dioxide Level


  


  


  43 mmol/L


(21-32)


 


Anion Gap    (6-14) 


 


Blood Urea Nitrogen


  


  


  16 mg/dL


(7-20)


 


Creatinine


  


  


  0.9 mg/dL


(0.6-1.0)


 


Estimated GFR


(Cockcroft-Gault) 


  


  59.7 


 


 


Glucose Level


  


  


  89 mg/dL


(70-99)


 


Calcium Level


  


  


  8.5 mg/dL


(8.5-10.1)


 


Triglycerides Level


  


  


  92 mg/dL


(0-150)


 


Cholesterol Level


  


  


  207 mg/dL


(0-200)


 


LDL Cholesterol, Calculated


  


  


  100 mg/dL


(0-100)


 


VLDL Cholesterol, Calculated


  


  


  18 mg/dL


(0-40)


 


Non-HDL Cholesterol Calculated


  


  


  118 mg/dL


(0-129)


 


HDL Cholesterol


  


  


  89 mg/dL


(40-60)


 


Cholesterol/HDL Ratio   2.3 








Medications





Active Scripts








 Medications  Dose


 Route/Sig


 Max Daily Dose Days Date Category


 


 Cefpodoxime


 Proxetil 100 Mg


 Tablet  100 Mg


 PO BID


   3 5/18/17 Rx


 


 Promethazine Hcl


 25 Mg Supp.rect  25 Mg


 RC Q6H PRN


    3/10/17 Rx


 


 Zofran Odt


  (Ondansetron) 4


 Mg Tab.rapdis  1 Tab


 SL Q8HRS


    3/5/17 Rx


 


 Duoneb 0.5-3(2.5)


 Mg/3 Ml


  (Albuterol/Ipratropium)


 3 Ml Ampul.neb  3 Ml


 NEB QID


    3/1/17 Reported


 


 Vitamin B-12


  (Cyanocobalamin


  (Vitamin B-12))


 1,000 Mcg Tablet  1 Tab


 PO DAILY


    1/19/17 Rx


 


 Senna


  (Sennosides) 8.6


 Mg Tablet  8.6 Mg


 PO DAILY


    9/14/16 Rx


 


 Docusate Sodium


 100 Mg Capsule  1 Cap


 PO DAILY


    9/14/16 Rx


 


 Trazodone Hcl 100


 Mg Tablet  1 Tab


 PO QHS


    9/1/16 Reported


 


 Vitamin D


  (Cholecalciferol


  (Vitamin D3))


 1,000 Unit Capsule  1 Cap


 PO DAILY


    9/1/16 Reported


 


 Percocet 


 Mg Tablet


  (Oxycodone/Acetaminophen)


 1 Each Tablet  1 Tab


 PO TID PRN


    6/8/16 Rx


 


 Cranberry


  (Cranberry Fruit)


 400 Mg Tablet  4,200 Mg


 PO DAILY


    6/1/16 Reported


 


 Levothyroxine


 Sodium 88 Mcg


 Tablet  1 Tab


 PO DAILY


    5/31/16 Reported


 


 Alprazolam 0.25


 Mg Tablet  1 Tab


 PO PRN TID PRN


    5/31/16 Reported


 


 Digoxin 125 Mcg


 Tablet  1 Tab


 PO DAILY


    6/1/15 Reported


 


 Zofran Odt


  (Ondansetron) 4


 Mg Tab.rapdis  1 Tab


 SL Q8HRS PRN


    6/1/15 Reported


 


 Escitalopram


 Oxalate 10 Mg


 Tablet  20 Mg


 PO DAILY


    6/8/14 Reported











Impression


.


1.  Acute on chronic hypoxemic hypercapnic respiratory failure.


2.  Encephalopathy, suspect secondary to CO2 narcosis. improved.


3.  Fall, possibly related to CO2 narcosis.


4.  Possible cerebrovascular accident.


5.  Chronic obstructive pulmonary disease, on chronic oxygen supplementation at


5 liters.


6.  History of lung cancer status post radiation treatment 5 years ago.





Plan


.





1.  Recommend not increasing FiO2 greater than 2 liters at rest.  The arterial 

blood gas


revealed a pH of 7.44, PaCO2 of 62, PaO2 of 72.


2.  P.r.n. BiPAP.


3.  Consult Neurology, already performed.


4.  Continue current bronchodilators.


5.  increase ambulation











EDDIE ANDRE MD Jun 13, 2017 08:13

## 2017-06-13 NOTE — PDOC
PROGRESS NOTES


Subjective


Subjective


weak rt side





Objective


Objective





Vital Signs








  Date Time  Temp Pulse Resp B/P (MAP) Pulse Ox O2 Delivery O2 Flow Rate FiO2


 


6/13/17 07:23     90 Nasal Cannula 2.0 


 


6/13/17 07:00 98.5 79 20 125/60 (81)    





 98.5       














Intake and Output 


 


 6/13/17





 06:59


 


Intake Total 520 ml


 


Output Total 200 ml


 


Balance 320 ml


 


 


 


Intake Oral 420 ml


 


IV Total 100 ml


 


Output Urine Total 200 ml


 


# Voids 3











Physical Exam


Abdomen:  Normal bowel sounds, Soft


Heart:  Regular rate, Normal S1, Normal S2


General:  Alert


HEENT:  Atraumatic


Lungs:  Clear to auscultation


MUSCULOSKELETAL:  No swelling


Neck:  Supple


Neuro:  Normal speech


Psych/Mental Status:  Mental status NL


Skin:  No breakdown


COMMENT


rt side weakness





Diagnosis


Problem List


Problems


Medical Problems:


(1) Altered mental status


Status: Acute  





(2) Fall


Status: Acute  





(3) UTI (urinary tract infection)


Status: Acute  








Assessment


Assessment


Problems


Medical Problems:


(1) Altered mental status


Status: Acute  





(2) Fall


Status: Acute  





(3) UTI (urinary tract infection)


Status: Acute  





FINAL IMPRESSION:


1.  Possible cerebrovascular accident, right-sided weakness.


2.  Fall at home from bed.


3.  Chronic obstructive pulmonary disease, end-stage, on 5 liters oxygen.


4.  Paroxysmal atrial fibrillation.


5.  Osteoporosis.


6.  Anemia.


7.  History of lung cancer, had a radiation treatment 5 years ago.


8.  Urinary tract infection.  We will send UA and culture.  Start on Rocephin.





PLAN:  


MRI brain today.


rehab consult


echo


carotid  dopller.


cannot  take asa ,will start on plavix.


lipitor  for chol.


snu soon


spoke with family ,


rocephin for uti





At this time, admit to hospital.  CT head was done.  Neurology was


consulted.  Speech, PT, OT, and Rehab.


Problems:  





Plan


Plan of Care


Problems


Medical Problems:


(1) Altered mental status


Status: Acute  





(2) Fall


Status: Acute  





(3) UTI (urinary tract infection)


Status: Acute  








Comment


Review of Relevant


I have reviewed the following items shan (where applicable) has been applied.


Labs





Laboratory Tests








Test


  6/13/17


03:25


 


White Blood Count


  5.8 x10^3/uL


(4.0-11.0)


 


Red Blood Count


  3.07 x10^6/uL


(3.50-5.40)


 


Hemoglobin


  8.6 g/dL


(12.0-15.5)


 


Hematocrit


  28.0 %


(36.0-47.0)


 


Mean Corpuscular Volume 91 fL () 


 


Mean Corpuscular Hemoglobin 28 pg (25-35) 


 


Mean Corpuscular Hemoglobin


Concent 31 g/dL


(31-37)


 


Red Cell Distribution Width


  17.1 %


(11.5-14.5)


 


Platelet Count


  121 x10^3/uL


(140-400)


 


Neutrophils (%) (Auto) 53 % (31-73) 


 


Lymphocytes (%) (Auto) 31 % (24-48) 


 


Monocytes (%) (Auto) 15 % (0-9) 


 


Eosinophils (%) (Auto) 2 % (0-3) 


 


Basophils (%) (Auto) 0 % (0-3) 


 


Neutrophils # (Auto)


  3.0 x10^3uL


(1.8-7.7)


 


Lymphocytes # (Auto)


  1.8 x10^3/uL


(1.0-4.8)


 


Monocytes # (Auto)


  0.9 x10^3/uL


(0.0-1.1)


 


Eosinophils # (Auto)


  0.1 x10^3/uL


(0.0-0.7)


 


Basophils # (Auto)


  0.0 x10^3/uL


(0.0-0.2)


 


Sodium Level


  143 mmol/L


(136-145)


 


Potassium Level


  4.4 mmol/L


(3.5-5.1)


 


Chloride Level


  101 mmol/L


()


 


Carbon Dioxide Level


  43 mmol/L


(21-32)


 


Anion Gap  (6-14) 


 


Blood Urea Nitrogen


  16 mg/dL


(7-20)


 


Creatinine


  0.9 mg/dL


(0.6-1.0)


 


Estimated GFR


(Cockcroft-Gault) 59.7 


 


 


Glucose Level


  89 mg/dL


(70-99)


 


Calcium Level


  8.5 mg/dL


(8.5-10.1)


 


Triglycerides Level


  92 mg/dL


(0-150)


 


Cholesterol Level


  207 mg/dL


(0-200)


 


LDL Cholesterol, Calculated


  100 mg/dL


(0-100)


 


VLDL Cholesterol, Calculated


  18 mg/dL


(0-40)


 


Non-HDL Cholesterol Calculated


  118 mg/dL


(0-129)


 


HDL Cholesterol


  89 mg/dL


(40-60)


 


Cholesterol/HDL Ratio 2.3 








Medications





Current Medications


Albuterol Sulfate (Ventolin Neb Soln) 2.5 mg PRN Q2HR  PRN NEB DYSPNEA;  Start 6 /12/17 at 17:45


Albuterol/ Ipratropium (Duoneb) 3 ml PRN Q4HRS  PRN NEB SHORTNESS OF BREATH;  

Start 6/12/17 at 21:00;  Status UNV


Albuterol/ Ipratropium (Duoneb) 3 ml Q4HRS NEB  Last administered on 6/13/17at 

07:21;  Start 6/12/17 at 20:00


Albuterol/ Ipratropium (Duoneb) 3 ml QID NEB ;  Start 6/12/17 at 21:00;  Status 

UNV


Alprazolam (Xanax) 0.25 mg PRN TID  PRN PO ANXIETY / AGITATION;  Start 6/12/17 

at 18:45


Ceftriaxone Sodium 1 gm/ Sodium Chloride 50 ml @  100 mls/hr Q24H IV ;  Start 6/ 13/17 at 11:00


Cyanocobalamin (Vitamin B-12) 1,000 mcg DAILY PO ;  Start 6/13/17 at 09:00


Dextrose/Sodium Chloride 1,000 ml @  75 mls/hr [id number] IV  Last administered on 

6/13/17at 00:29;  Start 6/12/17 at 10:45


Digoxin (Lanoxin) 125 mcg DAILY PO ;  Start 6/13/17 at 09:00


Docusate Sodium (Colace) 100 mg DAILY PO ;  Start 6/13/17 at 09:00


Escitalopram Oxalate (Lexapro) 20 mg DAILY PO ;  Start 6/13/17 at 09:00


Levothyroxine Sodium (Synthroid) 88 mcg DAILY07 PO  Last administered on 6/13/ 17at 06:03;  Start 6/13/17 at 07:00


Non-Formulary Medication 4,200 mg DAILY PO ;  Start 6/13/17 at 09:00;  Status 

UNV


Ondansetron HCl (Zofran Odt) 4 mg PRN Q8HRS  PRN PO NAUSEA;  Start 6/14/17 at 06

:00


Ondansetron HCl (Zofran Odt) 4 mg Q8HRS PO  Last administered on 6/13/17at 06:04

;  Start 6/12/17 at 22:00;  Stop 6/13/17 at 07:20;  Status DC


Oxycodone/ Acetaminophen (Percocet 10/325) 1 tab PRN TID  PRN PO PAIN Last 

administered on 6/12/17at 18:58;  Start 6/12/17 at 18:45


Sennosides (Senna) 8.6 mg DAILY PO ;  Start 6/13/17 at 09:00


Trazodone HCl (Desyrel) 100 mg QHS PO  Last administered on 6/12/17at 22:10;  

Start 6/12/17 at 21:00


Vitamin D (Vitamin D3) 1,000 unit DAILY PO ;  Start 6/13/17 at 09:00


Vitals/I & O





Vital Sign - Last 24 Hours








 6/12/17 6/12/17 6/12/17 6/12/17





 10:32 11:00 11:00 11:15


 


Temp  98.3 98.3 





  98.3 98.3 


 


Pulse 88 64 64 


 


Resp 27 20 20 


 


B/P (MAP) 123/65 (84) 121/61 (81) 121/61 (81) 


 


Pulse Ox 93 98 98 


 


O2 Delivery BiPAP/CPAP Nasal Cannula Nasal Cannula Nasal Cannula


 


O2 Flow Rate  3.0 3.0 3.0


 


    





    





 6/12/17 6/12/17 6/12/17 6/12/17





 12:14 15:00 15:52 18:58


 


Temp  98.2  





  98.2  


 


Pulse  53  


 


Resp  22  


 


B/P (MAP)  129/70 (89)  


 


Pulse Ox 95 92 92 92


 


O2 Delivery BiPAP/CPAP Nasal Cannula Nasal Cannula Nasal Cannula


 


O2 Flow Rate  2.0 2.0 2.0


 


    





    





 6/12/17 6/12/17 6/12/17 6/12/17





 19:53 19:58 20:00 21:25


 


Temp 98.9   





 98.9   


 


Pulse 98   


 


Resp 28 22  


 


B/P (MAP) 131/67 (88)   


 


Pulse Ox 83 94  94


 


O2 Delivery Nasal Cannula Nasal Cannula Nasal Cannula Nasal Cannula


 


O2 Flow Rate 2.0 2.0 2.0 2.0


 


    





    





 6/12/17 6/13/17 6/13/17 6/13/17





 23:35 03:06 07:00 07:23


 


Temp   98.5 





   98.5 


 


Pulse 83  79 


 


Resp 20  20 


 


B/P (MAP) 114/53 (73)  125/60 (81) 


 


Pulse Ox 89  90 90


 


O2 Delivery Nasal Cannula Nasal Cannula Nasal Cannula Nasal Cannula


 


O2 Flow Rate 2.0 2.0 2.0 2.0














Intake and Output   


 


 6/12/17 6/12/17 6/13/17





 14:59 22:59 06:59


 


Intake Total 100 ml 360 ml 60 ml


 


Output Total  200 ml 


 


Balance 100 ml 160 ml 60 ml

















SUMMER TABOR MD Jun 13, 2017 10:13

## 2017-06-13 NOTE — RAD
MRI Brain with and without contrast

 

History:RECENT FALLS, WEAKNESS, CONFUSION

 

Technique: Axial diffusion, axial gradient echo T2, axial T2, axial FLAIR,

sagittal and axial T1, and postcontrast axial, sagittal, and coronal 

T1-weighted images were acquired of the brain.

 

Contrast:  6 cc  Gadavist

 

Comparison: None

 

Findings: There is some motion degradation. There are several scattered 

small foci of restricted diffusion of the left parasagittal parietal 

cortex, some mild corresponding FLAIR hyperintense signal of the largest 

focus. There is also some amorphous mild restricted diffusion involving 

the left cingulate gyrus and adjacent white matter. There is no restricted

diffusion of the right hemisphere or the posterior fossa. There is no 

midline shift or extra-axial fluid collection. There is no nodular 

parenchymal or leptomeningeal enhancement. There are multiple old lacunar 

infarcts of the bilateral cerebellum greater on the right, the right 

thalamus, and of the right frontal deep white matter. There are also foci 

of encephalomalacia extending to the cortical surfaces of the temporal 

lobes bilaterally with associated gliosis signified by T2 and FLAIR 

hyperintense signal abnormality. There is other scattered mild-to-moderate

T2 and FLAIR signal abnormality of the supratentorial white matter. 

Ventricular size is proportionate to the sulcal spaces, mild generalized 

supratentorial atrophy. There has been lens surgery bilaterally. There is 

gross preservation of the major arterial flow voids at the skull base. The

mastoid air cells are aerated. There is patchy minimal ethmoid air cell 

mucosal thickening. Cerebellar tonsils are normal in location. There is 

preservation of marrow signal of the clivus. There is no significant 

abnormality of the pineal gland or pituitary gland.

 

 

Impression:

1. There are several scattered foci of restricted diffusion of the left 

parietal lobe, also focus of the left cingulate gyrus. Findings are most 

compatible with foci of recent acute/early subacute infarcts. 

2. There are old infarcts of the bilateral temporal lobes with cortical 

involvement and gliosis, also multiple old lacunar infarcts as stated. 

Other scattered T2 and FLAIR hyperintense abnormality is likely due to 

chronic microvascular ischemic disease.

3. 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

 

 

**********FOR INTERNAL CODING PURPOSES**********

 

Critical result:

 

Findings discussed with patient's nurse Esther Cartagena at 6/13/2017 4:10 PM.

 

RESULT CODE: (C)  

 

 

 

 

 

Electronically signed by: Nehemiah Lorenzo MD (6/13/2017 4:10 PM)

## 2017-06-13 NOTE — PDOC
PROGRESS NOTES


Assessment


Assessment


Metabolic encephalopathy.


Fall


UTI


AFib


COPD


Hypothyroidism


Lung cancer.


SOB





RECOMMENDATIONS/PLAN:


Treat medical diseases.


OT/PT








HCT no acute findings.





HISTORY OF THE PRESENT ILLNESS:


84-y-old female patient had a fall likely due to found on the floor from bed. 

She had mental status changes and was unable to recall the events. She was 

brought to the ER of [facility] for furtehr evaluation. She did not have focalized 

sensory or motor deficits noted.





PAST SURGICAL HISTORY:  


Lung cancer received radiation therapy.





PAST SURGERY HISTORY: 


Appendectomy, gallbladder surgery, , hysterectomy, tonsillectomy with


tympanoplasty, multiple compression fractures, back surgery x2, and hernia


repair x3.





ALLERGIES: 


ADHESIVES, ASPIRIN, AND MORPHINE.





PERSONAL HISTORY:  


She smoked for many years and quit recently, one pack at


least.  Denies alcohol or street drugs.





MEDICATIONS:


Refer to MAR





FAMILY HISTORY: 


Non contributory.





REVIEW OF SYSTEMS:


Constitutional: No malnutrition, weight loss, cachexia.


Head: No traumatic brain or head injury.


Skin: No edema, or rash.


Ear: No infection.


Eyes: No vision loss or color blindness.


Nose: No bleeding or purulent discharges.


Hearing: Hearing decrease.


Neck: No injury.


Breast: No history of cancer, masses,or discharges.


Cardiac: No MI, arrhythmia.


Pulmonary: COPD, lung cancer.


GI: No GI ulcer, GI bleeding.


Urinary/genital: UTI.


Endocrinologic: No cousin face, craniofacial dysmorphism, polydactyly.


Skeletomuscular: No muscular atrophy, deformity.


Neurological: see  HP.


Psychiatric: Denies drug use/abuse.


Otherwise, not pertinant14-point review of systems.





PHYSICAL EXAMINATION:   


General appearance is in no acute distress.  


HEENT:  Normocephalic and nontraumatic.  Eyes, nose, ears, and throat are 

unremarkable.  


Neck is supple. No lymphadenopathy. No crepitus. 


Cardiovascular:  S1, S2, irregular rate and rhythm.  


Pulmonary:  Breathing sounds decreased to auscultation bilaterally. 


Abdomen:  Bowel sounds are positive.  


Extremities:  No rash, lesions, or edema.  


No restriction of range of motion





NEUROLOGICAL  EXAMINATION:


Awake.


Oriented to place and person.


PERRL.


EOMI.


CN: no focal findings.


Muscle tone: within normal.


Muscle strength: 4+


DTR: 2-


Plantar reflex: Neutral response bilaterally 


Gait: not examined in bed. 


Sensory exam: no abnormal findings.


No acute cerebellar signs elicited.


F-T-N test fine.





Objective


Objective





Vital Signs








  Date Time  Temp Pulse Resp B/P (MAP) Pulse Ox O2 Delivery O2 Flow Rate FiO2


 


17 16:26      Nasal Cannula 2.0 


 


17 12:12     90   


 


17 10:09  79  125/60    


 


17 07:00 98.5  20     





 98.5       














Intake and Output 


 


 17





 07:00


 


Intake Total 520 ml


 


Output Total 200 ml


 


Balance 320 ml


 


 


 


Intake Oral 420 ml


 


IV Total 100 ml


 


Output Urine Total 200 ml


 


# Voids 3











Vitals Signs


Vitals





 VS - Last 72 Hours, by Label








  Date Time  Temp Pulse Resp B/P (MAP) Pulse Ox O2 Delivery O2 Flow Rate FiO2


 


17 16:26      Nasal Cannula 2.0 


 


17 12:12     90 Nasal Cannula 2.0 


 


17 11:49      Nasal Cannula 2.0 


 


17 10:09     90 Nasal Cannula 2.0 


 


17 10:09  79  125/60    


 


17 08:00      Nasal Cannula 2.0 


 


17 07:23     90 Nasal Cannula 2.0 


 


17 07:00 98.5 79 20 125/60 (81) 90 Nasal Cannula 2.0 





 98.5       


 


17 03:06      Nasal Cannula 2.0 


 


17 23:35  83 20 114/53 (73) 89 Nasal Cannula 2.0 


 


17 21:25     94 Nasal Cannula 2.0 


 


17 20:00      Nasal Cannula 2.0 


 


17 19:58   22     


 


17 19:53 98.9 98 28 131/67 (88) 83 Nasal Cannula 2.0 





 98.9       


 


17 18:58     92 Nasal Cannula 2.0 


 


17 15:52     92 Nasal Cannula 2.0 


 


17 15:00 98.2 53 22 129/70 (89) 92 Nasal Cannula 2.0 





 98.2       


 


17 12:14     95 BiPAP/CPAP  


 


17 11:15      Nasal Cannula 3.0 


 


17 11:00 98.3 64 20 121/61 (81) 98 Nasal Cannula 3.0 





 98.3       


 


17 11:00 98.3 64 20 121/61 (81) 98 Nasal Cannula 3.0 





 98.3       


 


17 10:32  88 27 123/65 (84) 93 BiPAP/CPAP  


 


17 10:02  95 20 129/58 (81) 93 BiPAP/CPAP  


 


17 09:40     95 BiPAP/CPAP  


 


17 09:32  68 20 124/64 (84) 100 BiPAP/CPAP  


 


17 09:23     98 Nasal Cannula 4.5 


 


17 09:02  67 22 134/58 (83) 99 Nasal Cannula 3.0 


 


17 08:57  65  131/60 (83)    


 


17 08:06  61 24 130/60 (83) 99 Nasal Cannula 3.0 


 


17 07:34 98.6 67 16 122/62 (82) 100 Nasal Cannula 5.0 





 98.6       











Laboratory


Laboratory





Laboratory Tests








Test


  17


03:25


 


White Blood Count


  5.8 x10^3/uL


(4.0-11.0)


 


Red Blood Count


  3.07 x10^6/uL


(3.50-5.40)


 


Hemoglobin


  8.6 g/dL


(12.0-15.5)


 


Hematocrit


  28.0 %


(36.0-47.0)


 


Mean Corpuscular Volume 91 fL () 


 


Mean Corpuscular Hemoglobin 28 pg (25-35) 


 


Mean Corpuscular Hemoglobin


Concent 31 g/dL


(31-37)


 


Red Cell Distribution Width


  17.1 %


(11.5-14.5)


 


Platelet Count


  121 x10^3/uL


(140-400)


 


Neutrophils (%) (Auto) 53 % (31-73) 


 


Lymphocytes (%) (Auto) 31 % (24-48) 


 


Monocytes (%) (Auto) 15 % (0-9) 


 


Eosinophils (%) (Auto) 2 % (0-3) 


 


Basophils (%) (Auto) 0 % (0-3) 


 


Neutrophils # (Auto)


  3.0 x10^3uL


(1.8-7.7)


 


Lymphocytes # (Auto)


  1.8 x10^3/uL


(1.0-4.8)


 


Monocytes # (Auto)


  0.9 x10^3/uL


(0.0-1.1)


 


Eosinophils # (Auto)


  0.1 x10^3/uL


(0.0-0.7)


 


Basophils # (Auto)


  0.0 x10^3/uL


(0.0-0.2)


 


Sodium Level


  143 mmol/L


(136-145)


 


Potassium Level


  4.4 mmol/L


(3.5-5.1)


 


Chloride Level


  101 mmol/L


()


 


Carbon Dioxide Level


  43 mmol/L


(21-32)


 


Anion Gap  (6-14) 


 


Blood Urea Nitrogen


  16 mg/dL


(7-20)


 


Creatinine


  0.9 mg/dL


(0.6-1.0)


 


Estimated GFR


(Cockcroft-Gault) 59.7 


 


 


Glucose Level


  89 mg/dL


(70-99)


 


Calcium Level


  8.5 mg/dL


(8.5-10.1)


 


Triglycerides Level


  92 mg/dL


(0-150)


 


Cholesterol Level


  207 mg/dL


(0-200)


 


LDL Cholesterol, Calculated


  100 mg/dL


(0-100)


 


VLDL Cholesterol, Calculated


  18 mg/dL


(0-40)


 


Non-HDL Cholesterol Calculated


  118 mg/dL


(0-129)


 


HDL Cholesterol


  89 mg/dL


(40-60)


 


Cholesterol/HDL Ratio 2.3 








Microbiology


17 Urine Culture - Preliminary, Resulted


          


17 Urine Culture Result 1 (RENAE) - Preliminary, Resulted





Medication


Medications





Current Medications


Albuterol/ Ipratropium (Duoneb) 3 ml PRN Q4HRS  PRN NEB SHORTNESS OF BREATH;  

Start 17 at 21:00;  Status UNV


Albuterol/ Ipratropium (Duoneb) 3 ml Q4HRS NEB  Last administered on  

16:25;  Start 17 at 20:00


Albuterol/ Ipratropium (Duoneb) 3 ml QID NEB ;  Start 17 at 21:00;  Status 

UNV


Atorvastatin Calcium (Lipitor) 10 mg QHS PO ;  Start 17 at 21:00


Ceftriaxone Sodium 1 gm/ Sodium Chloride 50 ml @  100 mls/hr Q24H IV  Last 

administered on  10:14;  Start 17 at 11:00


Clopidogrel Bisulfate (Plavix) 75 mg 1X  ONCE PO  Last administered on  10:31;  Start 17 at 10:15;  Stop 17 at 10:16;  Status DC


Clopidogrel Bisulfate (Plavix) 75 mg DAILYWBKFT PO ;  Start 17 at 08:00


Cyanocobalamin (Vitamin B-12) 1,000 mcg DAILY PO  Last administered on  10:09;  Start 17 at 09:00


Digoxin (Lanoxin) 125 mcg DAILY PO  Last administered on  10:09;  

Start 17 at 09:00


Docusate Sodium (Colace) 100 mg DAILY PO  Last administered on  10:10;

  Start 17 at 09:00


Escitalopram Oxalate (Lexapro) 20 mg DAILY PO  Last administered on  10

:10;  Start 17 at 09:00


Gadobutrol (Gadavist) 6 mmol 1X  ONCE IV  Last administered on  15:34;

  Start 17 at 15:30;  Stop 17 at 15:31;  Status DC


Levothyroxine Sodium (Synthroid) 88 mcg DAILY07 PO  Last administered on  06:03;  Start 17 at 07:00


Non-Formulary Medication 4,200 mg DAILY PO ;  Start 17 at 09:00;  Status 

UNV


Ondansetron HCl (Zofran Odt) 4 mg PRN Q8HRS  PRN PO NAUSEA;  Start 17 at 06

:00


Ondansetron HCl (Zofran Odt) 4 mg Q8HRS PO  Last administered on  06:04

;  Start 17 at 22:00;  Stop 17 at 07:20;  Status DC


Sennosides (Senna) 8.6 mg DAILY PO  Last administered on  10:09;  

Start 17 at 09:00


Trazodone HCl (Desyrel) 100 mg QHS PO  Last administered on  22:10;  

Start 17 at 21:00


Vitamin D (Vitamin D3) 1,000 unit DAILY PO  Last administered on  10:10

;  Start 17 at 09:00





Comment


Review of Relevant


I have reviewed the following items shan (where applicable) has been applied.











DONNA GALVAN MD 2017 18:59

## 2017-06-13 NOTE — CONS
DATE OF CONSULTATION:  2017



ATTENDING PHYSICIAN:  Dr. Couch.



REASON FOR CONSULTATION:  The patient seen in pulmonary consultation at the

request of Dr. Couch for acute on chronic hypercapnic hypoxemic respiratory

failure.



HISTORY OF PRESENT ILLNESS:  The patient is an 84-year-old that apparently fell

at home.  She was evaluated and found to have elevated pCO2 at 74.  She was

placed on BiPAP.  She is now off of BiPAP.  She is doing well.  She normally

wears 5 liters of oxygen at home, she is currently on 2 and is doing well.  She

denies any increasing shortness of breath.  She does not know why she fell.



PAST MEDICAL HISTORY:  Remarkable for chronic respiratory failure on 5 liters of

oxygen supplementation, COPD with an asthma component, depression,

hypothyroidism, previous pneumonia.  She also had history of lung cancer, status

post radiation, she is currently in remission.  Osteoporosis.



PAST SURGICAL HISTORY:  Status post appendectomy, cholecystectomy, ,

hysterectomy, tonsillectomy, back surgery.



ALLERGIES:  ASPIRIN AND MORPHINE.



SOCIAL HISTORY:  She is no longer smoking.



REVIEW OF SYSTEMS:  As indicated above, otherwise, a 10-point system was

reviewed and negative.



FAMILY HISTORY:  Noncontributory.



CURRENT MEDICATION:  List was reviewed.  Please see the MRAD.



PHYSICAL EXAMINATION:

GENERAL:  The patient was in no respiratory distress, currently on 2 liters of

oxygen supplementation.

HEENT:  Eyes, the sclerae were nonicteric.

NECK:  Jugular venous distention was not elevated.  No lymphadenopathy.

CHEST:  Full expansion.

LUNGS:  Adequate airway flow with no wheezes.

CARDIOVASCULAR:  Regular rate and rhythm with S1, S2, no S3.

ABDOMEN:  Soft, nontender, nondistended.

EXTREMITIES:  No clubbing, cyanosis or edema.

NEUROLOGIC:  The patient was awake, alert, following commands.  A detailed neuro

exam was not performed.



LABORATORY DATA:  Reviewed.  White count was 5.3, hemoglobin and hematocrit were

noted.  Electrolytes were noted.  Carbon dioxide level was greater than 45. 

Toxicology screen was noted.  UA was noted.



IMPRESSION:

1.  Acute on chronic hypoxemic hypercapnic respiratory failure.

2.  Encephalopathy, suspect secondary to CO2 narcosis.

3.  Fall, possibly related to CO2 narcosis.

4.  Possible cerebrovascular accident.

5.  Chronic obstructive pulmonary disease, on chronic oxygen supplementation at

5 liters.

6.  History of lung cancer status post radiation treatment 5 years ago.



PLAN:

1.  Recommend not increasing FiO2 greater than 2 liters.  The arterial blood gas

revealed a pH of 7.44, PaCO2 of 62, PaO2 of 72.

2.  P.r.n. BiPAP.

3.  Consult Neurology, already performed.

4.  Continue current bronchodilators.



Dr. Couch, I do appreciate the privilege in sharing in the patient's care.

 



______________________________

JULIO CESAR KAPADIA MD



DR:  MICHAEL/aidan  JOB#:  [id number] / [id number]

DD:  2017 17:40  DT:  2017 05:34

## 2017-06-13 NOTE — RAD
EXAM: Carotid Doppler sonogram.



HISTORY: Cerebrovascular accident.



TECHNIQUE: Grayscale and color Doppler sonographic evaluation of the neck with

spectral waveform analysis was performed and static images are submitted for

review. 



FINDINGS: 



RIGHT: The peak systolic velocity within the common carotid artery is 74

cm/sec. The peak systolic velocity within the internal carotid artery is 85

cm/sec and the end diastolic velocity within the internal carotid artery is 26

cm/sec. The ICA/CCA ratio is 1.15. Grayscale images demonstrate calcified

plaquing at the carotid bulb.



LEFT: The peak systolic velocity within the common carotid artery is 68

cm/sec. The peak systolic velocity within the internal carotid artery is 100

cm/sec and the end diastolic velocity within the internal carotid artery is 29

cm/sec. The ICA/CCA ratio is 1.47. Grayscale images demonstrate calcified

plaquing at the carotid bulb.



There is antegrade flow within both vertebral arteries.    



IMPRESSION: 

1. No evidence of hemodynamically significant stenosis.





PQRS Compliance Statement - Stenosis calculations for CT, MR and conventional

angiography are based upon measurement of the distal ICA diameter in

accordance with the NASCET methodology.  Stenosis calculations for carotid

ultrasound studies are derived from validated velocity criteria which are

known to correlate with the NASCET methodology.

## 2017-06-13 NOTE — EKG
[facility]

              [address] [place] [address]

Test Date:    2017               Test Time:    08:14:20

Pat Name:     YAN COLLINS             Department:   

Patient ID:   [id number]           Room:          

Gender:       F                        Technician:   

:          [id number]               Requested By: DARREN RUTLEDGE

Order Number: [id number]            Reading MD:   Carrington Alvarado

                                 Measurements

Intervals                              Axis          

Rate:         75                       P:            

NV:                                    QRS:          -17

QRSD:         74                       T:            10

QT:           340                                    

QTc:          382                                    

                           Interpretive Statements

SINUS RHYTHM

VENTRICULAR PREMATURE COMPLEX(ES)





Electronically Signed On 2017 9:36:49 CDT by Carrington Alvarado

## 2017-06-14 VITALS — DIASTOLIC BLOOD PRESSURE: 61 MMHG | SYSTOLIC BLOOD PRESSURE: 103 MMHG

## 2017-06-14 VITALS — DIASTOLIC BLOOD PRESSURE: 45 MMHG | SYSTOLIC BLOOD PRESSURE: 109 MMHG

## 2017-06-14 VITALS — DIASTOLIC BLOOD PRESSURE: 44 MMHG | SYSTOLIC BLOOD PRESSURE: 113 MMHG

## 2017-06-14 VITALS — DIASTOLIC BLOOD PRESSURE: 54 MMHG | SYSTOLIC BLOOD PRESSURE: 83 MMHG

## 2017-06-14 VITALS — DIASTOLIC BLOOD PRESSURE: 56 MMHG | SYSTOLIC BLOOD PRESSURE: 108 MMHG

## 2017-06-14 LAB
HCO3 BLDA-SCNC: 35 MMOL/L (ref 21–28)
PCO2 BLDA: 68 MMHG (ref 35–46)
PH ABG: 7.33 (ref 7.35–7.45)
PO2 BLDA: 49 MMHG (ref 65–108)
SAO2 % BLDA: 79 % (ref 92–99)

## 2017-06-14 RX ADMIN — ATORVASTATIN CALCIUM SCH MG: 10 TABLET, FILM COATED ORAL at 21:57

## 2017-06-14 RX ADMIN — LEVOTHYROXINE SODIUM SCH MCG: 88 TABLET ORAL at 06:24

## 2017-06-14 RX ADMIN — CLOPIDOGREL BISULFATE SCH MG: 75 TABLET ORAL at 08:48

## 2017-06-14 RX ADMIN — TRAZODONE HYDROCHLORIDE SCH MG: 100 TABLET ORAL at 21:57

## 2017-06-14 RX ADMIN — IPRATROPIUM BROMIDE AND ALBUTEROL SULFATE SCH ML: .5; 3 SOLUTION RESPIRATORY (INHALATION) at 19:14

## 2017-06-14 RX ADMIN — ESCITALOPRAM OXALATE SCH MG: 10 TABLET ORAL at 08:47

## 2017-06-14 RX ADMIN — DOCUSATE SODIUM SCH MG: 100 CAPSULE, LIQUID FILLED ORAL at 08:51

## 2017-06-14 RX ADMIN — DEXTROSE AND SODIUM CHLORIDE SCH MLS/HR: 5; .9 INJECTION, SOLUTION INTRAVENOUS at 01:24

## 2017-06-14 RX ADMIN — SENNOSIDES A AND B SCH MG: 8.6 TABLET, FILM COATED ORAL at 08:47

## 2017-06-14 RX ADMIN — IPRATROPIUM BROMIDE AND ALBUTEROL SULFATE SCH ML: .5; 3 SOLUTION RESPIRATORY (INHALATION) at 07:41

## 2017-06-14 RX ADMIN — CYANOCOBALAMIN TAB 1000 MCG SCH MCG: 1000 TAB at 08:48

## 2017-06-14 RX ADMIN — OXYCODONE HYDROCHLORIDE AND ACETAMINOPHEN PRN TAB: 10; 325 TABLET ORAL at 10:40

## 2017-06-14 RX ADMIN — VITAMIN D, TAB 1000IU (100/BT) SCH UNIT: 25 TAB at 08:48

## 2017-06-14 RX ADMIN — IPRATROPIUM BROMIDE AND ALBUTEROL SULFATE SCH ML: .5; 3 SOLUTION RESPIRATORY (INHALATION) at 11:01

## 2017-06-14 RX ADMIN — ACETAMINOPHEN PRN MG: 500 TABLET ORAL at 12:45

## 2017-06-14 RX ADMIN — IPRATROPIUM BROMIDE AND ALBUTEROL SULFATE SCH ML: .5; 3 SOLUTION RESPIRATORY (INHALATION) at 03:33

## 2017-06-14 RX ADMIN — DIGOXIN SCH MCG: 125 TABLET ORAL at 08:49

## 2017-06-14 RX ADMIN — IPRATROPIUM BROMIDE AND ALBUTEROL SULFATE SCH ML: .5; 3 SOLUTION RESPIRATORY (INHALATION) at 16:32

## 2017-06-14 NOTE — CARD
--------------- APPROVED REPORT --------------





EXAM: Two-dimensional and M-mode echocardiogram with Doppler and color Doppler.



Other Information 

Quality : Average



INDICATION

CVA/TIA 



2D DIMENSIONS 

RVDd2.9 (2.9-3.5cm)Left Atrium(2D)3.0 (1.6-4.0cm)

IVSd1.4 (0.7-1.1cm)Aortic Root(2D)3.0 (2.0-3.7cm)

LVDd4.8 (3.9-5.9cm)LVOT Diameter1.8 (1.8-2.4cm)

PWd1.2 (0.7-1.1cm)LVDs3.0 (2.5-4.0cm)

FS (%) 37.6 %SV71.5 ml

LVEF(%)67.6 (>50%)



Aortic Valve

AoV Peak Alvaro.178.4cm/sAoV VTI27.7cm

AO Peak GR.12.7mmHgLVOT Peak Alvaro.134.3cm/s

LVOT  VTI 22.44cmAO Mean GR.7mmHg

JOEY (VMAX)1.86lv4LQH   (VTI)1.86cm2

AI P 1/2 [id number]



Mitral Valve

MV E Fubpdwxo925.1cm/sMV DECEL [id number]

MV A Svuxdqsn055.8cm/sMV E Mean Gr.2mmHg

MV [id number]/A  Ratio0.9

MV A Dtcmncah51qyZOH (PHT)3.42cm2



TDI

E/Lateral E'11.2E/Medial E'7.4



Pulmonary Valve

PV Peak Ibywvdgm207.3cm/sPV Peak Grad.9mmHg

RVOT VTI10.4cm



Tricuspid Valve

TR P. Lcltvguw785fu/sTR Peak Gr.48mmHg



Pulmonary Vein

S1 Penbkbdl83.7cm/sD2 Qchsrxhj90.3cm/s



 LEFT VENTRICLE 

The left ventricle is normal size. There is mild concentric left ventricular hypertrophy. The left ve
ntricular systolic function is normal. The ejection fraction is 60-65%. There is normal LV segmental 
wall motion. Transmitral Doppler flow pattern is Grade I-abnormal relaxation pattern. There is no mercedes
tricular septal defect visualized.



 RIGHT VENTRICLE 

The right ventricle is normal size. There is normal right ventricular wall thickness. The right ventr
icular systolic function is normal.



 ATRIA 

The left atrium size is normal. The right atrium size is normal. The interatrial septum is intact wit
h no evidence for an atrial septal defect or patent foramen ovale as noted on 2-D or Doppler imaging.




 AORTIC VALVE 

The aortic valve is mildly sclerotic. Doppler and Color Flow revealed mild aortic regurgitation. Ther
e is no significant aortic valvular stenosis. There is no aortic valvular vegetation.



 MITRAL VALVE 

The mitral valve is normal in structure and function. There is no mitral valve stenosis. Doppler and 
Color Flow revealed no mitral valve regurgitation noted.



 TRICUSPID VALVE 

The tricuspid valve is normal in structure and function. Doppler and Color Flow revealed mild tricusp
id regurgitation. The PA pressure was estimated at 51 mmHg.



 PULMONIC VALVE 

The pulmonary valve is normal in structure and function. Doppler and Color Flow revealed trace pulmon
ic valvular regurgitation.



 GREAT VESSELS 

The aortic root is normal in size. Normal pulmonary venous flow (Doppler). The IVC is normal in size 
and collapses >50% with inspiration.



 PERICARDIAL EFFUSION 

There is no pleural effusion. There is no evidence of significant pericardial effusion.



Critical Notification

Critical Value: No



<Conclusion>

The left ventricular systolic function is normal.

The ejection fraction is 60-65%.

There is normal LV segmental wall motion.

Mild aortic regurgitation.

Mild tricuspid regurgitation.

The PA pressure was estimated at 51 mmHg.

There is no evidence of significant pericardial effusion.

## 2017-06-14 NOTE — PDOC2
CONSULT


Date of Consult


Date of Consult


DATE: 6/14/17 


TIME: 20:46





Reason for Consult


Reason for Consult:


Atrial fibrillation





Referring Physician


Referring Physician:


Dr. Couch





Identification/Chief Complaint


Chief Complaint


Fall and CVA





History of Present Illness


Reason for Visit:


Patient is laying in bed on  O2  by nasal cannula, attempting to eat, she is 

being fed by her daughter.





She appears to be somewhat confused but answers questions although the answer 

is invariably no.





Not a good historian at this point.





The patient denies having any previous cardiac problems but by the chart we see 

that the patient has a known history of paroxysmal atrial fibrillation as well 

as a lung cancer and radiation, hypertension and severe COPD with pulmonary 

hypertension.





The patient was found on the floor and confused on able to move much of the 

right side. It is not known how long she was down but she came in with an 

encephalopathy, weakness, in atrial fibrillation, and the CT of the head showed 

no acute bleed but a possible CVA.





The patient is being followed by the neurologist, the pulmonologist, and the 

physical therapist.





I was asked to see the patient about the atrial fibrillation.





The rate has been controlled since admission.





Past Medical History


Cardiovascular:  AFIB, HTN


Pulmonary:  COPD, Pneumonia


Heme/Onc:  Cancer


Psych:  Anxiety, Depression


Musculoskeletal:   low back pain, Osteoarthritis





Past Surgical History


Past Surgical History:  Appendectomy, Cholecystectomy, Hernia Repair, 

Tonsillectomy, Hysterectomy, Other





Family History


Family History:  Diabetes, Heart Disease, Hypertension, Stroke





Social History


ALCOHOL:  none


Drugs:  None


Lives:  with Family





Current Problem List


Problem List


Problems


Medical Problems:


(1) Altered mental status


Status: Acute  





(2) Fall


Status: Acute  





(3) UTI (urinary tract infection)


Status: Acute  











Current Medications


Current Medications





Current Medications


Ceftriaxone Sodium 50 ml @  100 mls/hr 1X  ONCE IV  Last administered on 6/12/ 17at 08:30;  Start 6/12/17 at 08:30;  Stop 6/12/17 at 08:59;  Status DC


Sodium Chloride 1,000 ml @  75 mls/hr [id number] IV  Last administered on 6/12/ 17at 09:10;  Start 6/12/17 at 09:10;  Stop 6/12/17 at 23:01;  Status DC


Ceftriaxone Sodium 1 gm/ Sodium Chloride 50 ml @  100 mls/hr Q24H IV  Last 

administered on 6/13/17at 10:14;  Start 6/13/17 at 11:00;  Stop 6/14/17 at 10:19

;  Status DC


Dextrose/Sodium Chloride 1,000 ml @  75 mls/hr [id number] IV  Last administered on 

6/14/17at 01:24;  Start 6/12/17 at 10:45;  Stop 6/14/17 at 10:19;  Status DC


Albuterol Sulfate (Ventolin Neb Soln) 2.5 mg PRN Q2HR  PRN NEB DYSPNEA;  Start 6 /12/17 at 17:45


Albuterol/ Ipratropium (Duoneb) 3 ml Q4HRS NEB  Last administered on 6/14/17at 

19:14;  Start 6/12/17 at 20:00


Alprazolam (Xanax) 0.25 mg PRN TID  PRN PO ANXIETY / AGITATION;  Start 6/12/17 

at 18:45


Cyanocobalamin (Vitamin B-12) 1,000 mcg DAILY PO  Last administered on 6/14/ 17at 08:48;  Start 6/13/17 at 09:00


Digoxin (Lanoxin) 125 mcg DAILY PO  Last administered on 6/14/17at 08:49;  

Start 6/13/17 at 09:00


Docusate Sodium (Colace) 100 mg DAILY PO  Last administered on 6/14/17at 08:51;

  Start 6/13/17 at 09:00


Escitalopram Oxalate (Lexapro) 20 mg DAILY PO  Last administered on 6/14/17at 08

:47;  Start 6/13/17 at 09:00


Albuterol/ Ipratropium (Duoneb) 3 ml QID NEB ;  Start 6/12/17 at 21:00;  Status 

UNV


Levothyroxine Sodium (Synthroid) 88 mcg DAILY07 PO  Last administered on 6/14/ 17at 06:24;  Start 6/13/17 at 07:00


Ondansetron HCl (Zofran Odt) 4 mg Q8HRS PO  Last administered on 6/13/17at 06:04

;  Start 6/12/17 at 22:00;  Stop 6/13/17 at 07:20;  Status DC


Oxycodone/ Acetaminophen (Percocet 10/325) 1 tab PRN TID  PRN PO SEVERE PAIN 

Last administered on 6/14/17at 10:40;  Start 6/12/17 at 18:45


Sennosides (Senna) 8.6 mg DAILY PO  Last administered on 6/14/17at 08:47;  

Start 6/13/17 at 09:00


Trazodone HCl (Desyrel) 100 mg QHS PO  Last administered on 6/13/17at 21:23;  

Start 6/12/17 at 21:00


Vitamin D (Vitamin D3) 1,000 unit DAILY PO  Last administered on 6/14/17at 08:48

;  Start 6/13/17 at 09:00


Non-Formulary Medication 4,200 mg DAILY PO ;  Start 6/13/17 at 09:00;  Status 

UNV


Albuterol/ Ipratropium (Duoneb) 3 ml PRN Q4HRS  PRN NEB SHORTNESS OF BREATH;  

Start 6/12/17 at 21:00;  Status UNV


Ondansetron HCl (Zofran Odt) 4 mg PRN Q8HRS  PRN PO NAUSEA;  Start 6/14/17 at 06

:00


Clopidogrel Bisulfate (Plavix) 75 mg DAILYWBKFT PO  Last administered on 6/14/ 17at 08:48;  Start 6/14/17 at 08:00


Clopidogrel Bisulfate (Plavix) 75 mg 1X  ONCE PO  Last administered on 6/13/ 17at 10:31;  Start 6/13/17 at 10:15;  Stop 6/13/17 at 10:16;  Status DC


Atorvastatin Calcium (Lipitor) 10 mg QHS PO  Last administered on 6/13/17at 21:

24;  Start 6/13/17 at 21:00


Gadobutrol (Gadavist) 6 mmol 1X  ONCE IV  Last administered on 6/13/17at 15:34;

  Start 6/13/17 at 15:30;  Stop 6/13/17 at 15:31;  Status DC


Prednisone (Prednisone) 10 mg DAILY PO  Last administered on 6/14/17at 12:44;  

Start 6/14/17 at 12:00


Acetaminophen (Tylenol) 500 mg PRN Q6HRS  PRN PO MILD PAIN / TEMP Last 

administered on 6/14/17at 12:45;  Start 6/14/17 at 11:30


Oxycodone/ Acetaminophen (Percocet 5/325) 1 tab PRN TID  PRN PO MODERATE PAIN;  

Start 6/14/17 at 11:30





Active Scripts


Active


Zofran Odt (Ondansetron) 4 Mg Tab.rapdis 1 Tab SL Q8HRS


Vitamin B-12 (Cyanocobalamin (Vitamin B-12)) 1,000 Mcg Tablet 1 Tab PO DAILY


Senna (Sennosides) 8.6 Mg Tablet 8.6 Mg PO DAILY


Docusate Sodium 100 Mg Capsule 1 Cap PO DAILY


Percocet  Mg Tablet (Oxycodone/Acetaminophen) 1 Each Tablet 1 Tab PO TID 

PRN


Reported


Duoneb 0.5-3(2.5) Mg/3 Ml (Albuterol/Ipratropium) 3 Ml Ampul.neb 3 Ml NEB QID


Trazodone Hcl 100 Mg Tablet 1 Tab PO QHS


Vitamin D (Cholecalciferol (Vitamin D3)) 1,000 Unit Capsule 1 Cap PO DAILY


Cranberry (Cranberry Fruit) 400 Mg Tablet 4,200 Mg PO DAILY


Levothyroxine Sodium 88 Mcg Tablet 1 Tab PO DAILY


Alprazolam 0.25 Mg Tablet 1 Tab PO PRN TID PRN


Digoxin 125 Mcg Tablet 1 Tab PO DAILY


Escitalopram Oxalate 10 Mg Tablet 20 Mg PO DAILY





Allergies


Allergies:  


Coded Allergies:  


     morphine (Verified  Allergy, Intermediate, PER DAUGHTER, DOES NOT AGREE 

WITH PT, 6/7/14)


     adhesive (Verified  Adverse Reaction, Intermediate, blisters, 4/22/15)


     aspirin (Verified  Adverse Reaction, Intermediate, Nausea and Vomiting, 

"makes me sick", 4/22/15)





Physical Exam


Physical Exam


The patient is alert, responsive, it is hard to tell how confused she is,  the 

only answers that I got from her was:  no





H EENT pupils are reactive. Oral mucosa hydrated.





Neck is supple no JVD.





Lungs breath sounds are decreased but no Rales no wheezing at this time.





Heart irregularly irregular. S1-S2. Muffled sounds.





Abdomen is soft bowel sounds are present.





Extremities 1+ edema





Vitals


VITALS





Vital Signs








  Date Time  Temp Pulse Resp B/P (MAP) Pulse Ox O2 Delivery O2 Flow Rate FiO2


 


6/14/17 19:14     91 Nasal Cannula 3.0 


 


6/14/17 19:12 99.1 120 16 103/61 (75)    





 99.1       











Labs


Labs





Laboratory Tests








Test


  6/13/17


03:25 6/14/17


12:23


 


White Blood Count


  5.8 x10^3/uL


(4.0-11.0) 


 


 


Red Blood Count


  3.07 x10^6/uL


(3.50-5.40) 


 


 


Hemoglobin


  8.6 g/dL


(12.0-15.5) 


 


 


Hematocrit


  28.0 %


(36.0-47.0) 


 


 


Mean Corpuscular Volume 91 fL ()  


 


Mean Corpuscular Hemoglobin 28 pg (25-35)  


 


Mean Corpuscular Hemoglobin


Concent 31 g/dL


(31-37) 


 


 


Red Cell Distribution Width


  17.1 %


(11.5-14.5) 


 


 


Platelet Count


  121 x10^3/uL


(140-400) 


 


 


Neutrophils (%) (Auto) 53 % (31-73)  


 


Lymphocytes (%) (Auto) 31 % (24-48)  


 


Monocytes (%) (Auto) 15 % (0-9)  


 


Eosinophils (%) (Auto) 2 % (0-3)  


 


Basophils (%) (Auto) 0 % (0-3)  


 


Neutrophils # (Auto)


  3.0 x10^3uL


(1.8-7.7) 


 


 


Lymphocytes # (Auto)


  1.8 x10^3/uL


(1.0-4.8) 


 


 


Monocytes # (Auto)


  0.9 x10^3/uL


(0.0-1.1) 


 


 


Eosinophils # (Auto)


  0.1 x10^3/uL


(0.0-0.7) 


 


 


Basophils # (Auto)


  0.0 x10^3/uL


(0.0-0.2) 


 


 


Sodium Level


  143 mmol/L


(136-145) 


 


 


Potassium Level


  4.4 mmol/L


(3.5-5.1) 


 


 


Chloride Level


  101 mmol/L


() 


 


 


Carbon Dioxide Level


  43 mmol/L


(21-32) 


 


 


Anion Gap  (6-14)  


 


Blood Urea Nitrogen


  16 mg/dL


(7-20) 


 


 


Creatinine


  0.9 mg/dL


(0.6-1.0) 


 


 


Estimated GFR


(Cockcroft-Gault) 59.7 


  


 


 


Glucose Level


  89 mg/dL


(70-99) 


 


 


Calcium Level


  8.5 mg/dL


(8.5-10.1) 


 


 


Triglycerides Level


  92 mg/dL


(0-150) 


 


 


Cholesterol Level


  207 mg/dL


(0-200) 


 


 


LDL Cholesterol, Calculated


  100 mg/dL


(0-100) 


 


 


VLDL Cholesterol, Calculated


  18 mg/dL


(0-40) 


 


 


Non-HDL Cholesterol Calculated


  118 mg/dL


(0-129) 


 


 


HDL Cholesterol


  89 mg/dL


(40-60) 


 


 


Cholesterol/HDL Ratio 2.3  


 


O2 Saturation  79 % (92-99) 


 


Arterial Blood pH


  


  7.33


(7.35-7.45)


 


Arterial Blood pCO2 at


Patient Temp 


  68 mmHg


(35-46)


 


Arterial Blood pO2 at Patient


Temp 


  49 mmHg


()


 


Arterial Blood HCO3


  


  35 mmol/L


(21-28)


 


Arterial Blood Base Excess


  


  7 mmol/L


(-3-3)








Laboratory Tests








Test


  6/14/17


12:23


 


O2 Saturation 79 % (92-99) 


 


Arterial Blood pH


  7.33


(7.35-7.45)


 


Arterial Blood pCO2 at


Patient Temp 68 mmHg


(35-46)


 


Arterial Blood pO2 at Patient


Temp 49 mmHg


()


 


Arterial Blood HCO3


  35 mmol/L


(21-28)


 


Arterial Blood Base Excess


  7 mmol/L


(-3-3)











Assessment/Plan


Assessment/Plan


Patient with a fall for an unknown amount of time that was found on the floor 

with an encephalopathy and a possible CVA is in atrial fibrillation. She has a 

known history of atrial fibrillation.





I do not think that she would be a good candidate for any anticoagulation 

beyond and aspirin.





The atrial fibrillation has been paroxysmal in the past and at this time the 

rate is controlled.





We will review the echocardiogram and then we will make further recommendations 

with regards to the medications.





Thank you very much for asking me to participate in the care of this patient











GRUPO NOLEN MD Jun 14, 2017 21:11

## 2017-06-14 NOTE — PDOC
PROGRESS NOTES


Subjective


Subjective


No new complaints.





Objective


Objective





Vital Signs








  Date Time  Temp Pulse Resp B/P (MAP) Pulse Ox O2 Delivery O2 Flow Rate FiO2


 


6/14/17 11:02      Nasal Cannula 2.0 


 


6/14/17 10:40     85   


 


6/14/17 08:49  87  109/45    


 


6/14/17 07:59 98.4  16     





 98.4       














Intake and Output 


 


 6/14/17





 07:00


 


Intake Total 650 ml


 


Balance 650 ml


 


 


 


Intake Oral 600 ml


 


IV Total 50 ml


 


# Voids 4











Physical Exam


Physical Exam


She is awake,supine in bed and she had voluntary movement of right ankle today.





Assessment


Assessment


Problems


Medical Problems:


(1) Altered mental status


Status: Acute  





(2) Fall


Status: Acute  





(3) UTI (urinary tract infection)


Status: Acute  











Plan


Plan of Care


I spoke to  and agree with plans for transfer to SNF when medically 

stable.To cancel right AFO.





Comment


Review of Relevant


I have reviewed the following items shan (where applicable) has been applied.


Labs





Laboratory Tests








Test


  6/13/17


03:25


 


White Blood Count


  5.8 x10^3/uL


(4.0-11.0)


 


Red Blood Count


  3.07 x10^6/uL


(3.50-5.40)


 


Hemoglobin


  8.6 g/dL


(12.0-15.5)


 


Hematocrit


  28.0 %


(36.0-47.0)


 


Mean Corpuscular Volume 91 fL () 


 


Mean Corpuscular Hemoglobin 28 pg (25-35) 


 


Mean Corpuscular Hemoglobin


Concent 31 g/dL


(31-37)


 


Red Cell Distribution Width


  17.1 %


(11.5-14.5)


 


Platelet Count


  121 x10^3/uL


(140-400)


 


Neutrophils (%) (Auto) 53 % (31-73) 


 


Lymphocytes (%) (Auto) 31 % (24-48) 


 


Monocytes (%) (Auto) 15 % (0-9) 


 


Eosinophils (%) (Auto) 2 % (0-3) 


 


Basophils (%) (Auto) 0 % (0-3) 


 


Neutrophils # (Auto)


  3.0 x10^3uL


(1.8-7.7)


 


Lymphocytes # (Auto)


  1.8 x10^3/uL


(1.0-4.8)


 


Monocytes # (Auto)


  0.9 x10^3/uL


(0.0-1.1)


 


Eosinophils # (Auto)


  0.1 x10^3/uL


(0.0-0.7)


 


Basophils # (Auto)


  0.0 x10^3/uL


(0.0-0.2)


 


Sodium Level


  143 mmol/L


(136-145)


 


Potassium Level


  4.4 mmol/L


(3.5-5.1)


 


Chloride Level


  101 mmol/L


()


 


Carbon Dioxide Level


  43 mmol/L


(21-32)


 


Anion Gap  (6-14) 


 


Blood Urea Nitrogen


  16 mg/dL


(7-20)


 


Creatinine


  0.9 mg/dL


(0.6-1.0)


 


Estimated GFR


(Cockcroft-Gault) 59.7 


 


 


Glucose Level


  89 mg/dL


(70-99)


 


Calcium Level


  8.5 mg/dL


(8.5-10.1)


 


Triglycerides Level


  92 mg/dL


(0-150)


 


Cholesterol Level


  207 mg/dL


(0-200)


 


LDL Cholesterol, Calculated


  100 mg/dL


(0-100)


 


VLDL Cholesterol, Calculated


  18 mg/dL


(0-40)


 


Non-HDL Cholesterol Calculated


  118 mg/dL


(0-129)


 


HDL Cholesterol


  89 mg/dL


(40-60)


 


Cholesterol/HDL Ratio 2.3 








Microbiology


6/12/17 Urine Culture - Preliminary, Resulted


          


6/12/17 Urine Culture Result 1 (RENAE) - Preliminary, Resulted


Medications





Current Medications


Ceftriaxone Sodium 50 ml @  100 mls/hr 1X  ONCE IV  Last administered on 6/12/ 17at 08:30;  Start 6/12/17 at 08:30;  Stop 6/12/17 at 08:59;  Status DC


Sodium Chloride 1,000 ml @  75 mls/hr [id number] IV  Last administered on 6/12/ 17at 09:10;  Start 6/12/17 at 09:10;  Stop 6/12/17 at 23:01;  Status DC


Ceftriaxone Sodium 1 gm/ Sodium Chloride 50 ml @  100 mls/hr Q24H IV  Last 

administered on 6/13/17at 10:14;  Start 6/13/17 at 11:00;  Stop 6/14/17 at 10:19

;  Status DC


Dextrose/Sodium Chloride 1,000 ml @  75 mls/hr [id number] IV  Last administered on 

6/14/17at 01:24;  Start 6/12/17 at 10:45;  Stop 6/14/17 at 10:19;  Status DC


Albuterol Sulfate (Ventolin Neb Soln) 2.5 mg PRN Q2HR  PRN NEB DYSPNEA;  Start 6 /12/17 at 17:45


Albuterol/ Ipratropium (Duoneb) 3 ml Q4HRS NEB  Last administered on 6/14/17at 

11:01;  Start 6/12/17 at 20:00


Alprazolam (Xanax) 0.25 mg PRN TID  PRN PO ANXIETY / AGITATION;  Start 6/12/17 

at 18:45


Cyanocobalamin (Vitamin B-12) 1,000 mcg DAILY PO  Last administered on 6/14/ 17at 08:48;  Start 6/13/17 at 09:00


Digoxin (Lanoxin) 125 mcg DAILY PO  Last administered on 6/14/17at 08:49;  

Start 6/13/17 at 09:00


Docusate Sodium (Colace) 100 mg DAILY PO  Last administered on 6/14/17at 08:51;

  Start 6/13/17 at 09:00


Escitalopram Oxalate (Lexapro) 20 mg DAILY PO  Last administered on 6/14/17at 08

:47;  Start 6/13/17 at 09:00


Albuterol/ Ipratropium (Duoneb) 3 ml QID NEB ;  Start 6/12/17 at 21:00;  Status 

UNV


Levothyroxine Sodium (Synthroid) 88 mcg DAILY07 PO  Last administered on 6/14/ 17at 06:24;  Start 6/13/17 at 07:00


Ondansetron HCl (Zofran Odt) 4 mg Q8HRS PO  Last administered on 6/13/17at 06:04

;  Start 6/12/17 at 22:00;  Stop 6/13/17 at 07:20;  Status DC


Oxycodone/ Acetaminophen (Percocet 10/325) 1 tab PRN TID  PRN PO PAIN Last 

administered on 6/14/17at 10:40;  Start 6/12/17 at 18:45


Sennosides (Senna) 8.6 mg DAILY PO  Last administered on 6/14/17at 08:47;  

Start 6/13/17 at 09:00


Trazodone HCl (Desyrel) 100 mg QHS PO  Last administered on 6/13/17at 21:23;  

Start 6/12/17 at 21:00


Vitamin D (Vitamin D3) 1,000 unit DAILY PO  Last administered on 6/14/17at 08:48

;  Start 6/13/17 at 09:00


Non-Formulary Medication 4,200 mg DAILY PO ;  Start 6/13/17 at 09:00;  Status 

UNV


Albuterol/ Ipratropium (Duoneb) 3 ml PRN Q4HRS  PRN NEB SHORTNESS OF BREATH;  

Start 6/12/17 at 21:00;  Status UNV


Ondansetron HCl (Zofran Odt) 4 mg PRN Q8HRS  PRN PO NAUSEA;  Start 6/14/17 at 06

:00


Clopidogrel Bisulfate (Plavix) 75 mg DAILYWBKFT PO  Last administered on 6/14/ 17at 08:48;  Start 6/14/17 at 08:00


Clopidogrel Bisulfate (Plavix) 75 mg 1X  ONCE PO  Last administered on 6/13/ 17at 10:31;  Start 6/13/17 at 10:15;  Stop 6/13/17 at 10:16;  Status DC


Atorvastatin Calcium (Lipitor) 10 mg QHS PO  Last administered on 6/13/17at 21:

24;  Start 6/13/17 at 21:00


Gadobutrol (Gadavist) 6 mmol 1X  ONCE IV  Last administered on 6/13/17at 15:34;

  Start 6/13/17 at 15:30;  Stop 6/13/17 at 15:31;  Status DC





Active Scripts


Active


Zofran Odt (Ondansetron) 4 Mg Tab.rapdis 1 Tab SL Q8HRS


Vitamin B-12 (Cyanocobalamin (Vitamin B-12)) 1,000 Mcg Tablet 1 Tab PO DAILY


Senna (Sennosides) 8.6 Mg Tablet 8.6 Mg PO DAILY


Docusate Sodium 100 Mg Capsule 1 Cap PO DAILY


Percocet  Mg Tablet (Oxycodone/Acetaminophen) 1 Each Tablet 1 Tab PO TID 

PRN


Reported


Duoneb 0.5-3(2.5) Mg/3 Ml (Albuterol/Ipratropium) 3 Ml Ampul.neb 3 Ml NEB QID


Trazodone Hcl 100 Mg Tablet 1 Tab PO QHS


Vitamin D (Cholecalciferol (Vitamin D3)) 1,000 Unit Capsule 1 Cap PO DAILY


Cranberry (Cranberry Fruit) 400 Mg Tablet 4,200 Mg PO DAILY


Levothyroxine Sodium 88 Mcg Tablet 1 Tab PO DAILY


Alprazolam 0.25 Mg Tablet 1 Tab PO PRN TID PRN


Digoxin 125 Mcg Tablet 1 Tab PO DAILY


Escitalopram Oxalate 10 Mg Tablet 20 Mg PO DAILY


Vitals/I & O





Vital Sign - Last 24 Hours








 6/13/17 6/13/17 6/13/17 6/13/17





 11:49 12:12 16:26 19:50


 


Temp    98.1





    98.1


 


Pulse    91


 


Resp    16


 


B/P (MAP)    115/56 (75)


 


Pulse Ox  90  


 


O2 Delivery Nasal Cannula  Nasal Cannula Nasal Cannula


 


O2 Flow Rate 2.0  2.0 2.0


 


    





    





 6/13/17 6/13/17 6/13/17 6/13/17





 20:00 21:24 22:24 23:12


 


Resp  17 20 


 


Pulse Ox    93


 


O2 Delivery Nasal Cannula Nasal Cannula Nasal Cannula BiPAP/CPAP


 


O2 Flow Rate 2.0 2.0 2.0 





 6/13/17 6/13/17 6/14/17 6/14/17





 23:37 23:50 02:15 03:05


 


Temp 98.1   





 98.1   


 


Pulse 76   


 


Resp 16   22


 


B/P (MAP) 118/67 (84)   


 


Pulse Ox 94 95 97 96


 


O2 Delivery Nasal Cannula BiPAP/CPAP BiPAP/CPAP BiPAP/CPAP


 


O2 Flow Rate 2.0   


 


    





    





 6/14/17 6/14/17 6/14/17 6/14/17





 03:33 05:29 07:59 08:00


 


Temp   98.4 





   98.4 


 


Pulse   87 


 


Resp   16 


 


B/P (MAP)   109/45 (66) 


 


Pulse Ox 95 95 85 


 


O2 Delivery BiPAP/CPAP BiPAP/CPAP Nasal Cannula Nasal Cannula


 


O2 Flow Rate   2.0 2.0


 


    





    





 6/14/17 6/14/17 6/14/17 





 08:49 10:40 11:02 


 


Pulse 87   


 


B/P (MAP) 109/45   


 


Pulse Ox  85  


 


O2 Delivery  Nasal Cannula Nasal Cannula 


 


O2 Flow Rate  2.0 2.0 














Intake and Output   


 


 6/13/17 6/13/17 6/14/17





 15:00 23:00 07:00


 


Intake Total 50 ml 600 ml 


 


Balance 50 ml 600 ml 

















ROBSON CARREON MD Jun 14, 2017 11:09

## 2017-06-14 NOTE — CONS
DATE OF CONSULTATION:  2017



ATTENDING PHYSICIAN:  Sg Couch M.D.



REASON FOR CONSULTATION:  The patient was seen at the request of Dr. Couch and

 _____ for rehab evaluation.



LOCATION:  She is in room 654.



HISTORY OF PRESENT ILLNESS:  This is an 84-year-old female with paroxysmal

atrial fibrillation, chronic obstructive pulmonary disease, carcinoma of lung,

hypothyroidism, emphysema.  She was found on the floor out of her bed, not known

what time.  Last time she was seen was on the night before this happened on

2017 in the morning.  She stays with her daughter.  Daughter works during

the daytime.  She usually walks using a roller walker.  She was noted with

weakness on the right side of her body.  CT scan of the brain was negative for

bleed.  CT scan of the neck revealed degenerative changes.  She apparently had

multiple falls, osteoporosis, not a good candidate for anticoagulation for her

atrial fibrillation.  She had radiation therapy for carcinoma of lung, also

appendectomy, cholecystectomy, , hysterectomy, tonsillectomy,

tympanoplasty, multiple compression fractures, back surgery done twice, hernia

repair 3 times.



ALLERGIES:  SHE IS KNOWN ALLERGIC TO MORPHINE, ASPIRIN AND ADHESIVE.



She uses oxygen at home about 5 liters, on Xanax 3 times a day at 0.25 mg.



The patient quit smoking recently after smoking for many years.  The patient had

her daughter as her caregiver and also had a neighbor who is a nurse who helps

them.  The patient since admission also had MRI scan of the brain done this

afternoon, which revealed several scattered foci of restricted diffusion of the

left parietal lobe and also focus of the left cingulate gyrus, findings

compatible with foci of recent acute or early subacute infarct, also old

infarcts of bilateral temporal lobes with cortical involvement and gliosis and

multiple old lacunar infarcts, most likely from chronic microvascular ischemic

disease and also generalized supratentorial atrophy.  The patient had carotid

Doppler studies done, which failed to reveal any significant stenotic disease.



PHYSICAL EXAMINATION:  The patient on physical examination today revealed an

elderly female.  She is alert and oriented to place and person, follows commands

appropriately, moves all 4 extremities voluntarily.  No active right foot

dorsiflexion was noted.  The patient had relative weakness of right hip

abductors and right knee extensors.  She had 5/5 grade muscle strength in her

upper extremities and left lower extremity.  Deep tendon reflexes are slightly

exaggerated at right knee, absent at both ankles.  She had equal perception of

touch and pinprick sensation bilaterally.  She had negative Tinel's sign over

peroneal nerve at fibular neck area.  No obvious visual field cut or facial

asymmetry noted.  No difficulty with her speech or swallowing observed.  She had

some tenderness to palpation over sacroiliac joint area bilaterally.  Straight

leg raising test is negative bilaterally.  She had crepitus on range of motion

on both knee joints without any obvious knee joint effusion and she had

pain-free range of motion of both hip joints and her skin is intact at this

time.  She is using oxygen by nasal cannula.  I have not tested her transfers or

ambulation skills, but she needs some help with rolling from side to side and

Physical Therapy tried to get her up this morning, she required maximal help of

2 persons.



ASSESSMENT:  Cerebrovascular accident with mild right lower extremity weakness

in a patient with known paroxysmal atrial fibrillation, carcinoma of lung,

status post radiation treatment, osteoporosis with multiple vertebral body

compression fractures and back surgery done twice in the past with continued

lower back pain and also with known chronic obstructive pulmonary disease,

hypothyroidism, emphysema, clinical evidence of degenerative joint disease of

both knees, peripheral neuropathy and degenerative disk disease of lumbar

vertebrae without any clinical evidence of ongoing lumbar radiculopathy and CT

scan evidence of degenerative changes in the cervical vertebrae.



RECOMMENDATIONS:  Agree with the plan for physical therapy and occupational

therapy to get her right ankle foot brace and to get her up as tolerated, and

when medically stable, to consider transfer to inpatient rehab unit or skilled

nursing care unit of choice to her family.



Dr. Couch, I appreciate asking me to participate in the care of this

interesting patient.  I will be glad to follow her with you as needed for her

rehabilitation.

 



______________________________

ROBSON CARREON MD



DR:  [email or web address]  JOB#:  [id number] / [id number]

DD:  2017 16:35  DT:  2017 03:51

## 2017-06-14 NOTE — PDOC
PROGRESS NOTES


Subjective


Subjective


weak rt leg





Objective


Objective





Vital Signs








  Date Time  Temp Pulse Resp B/P (MAP) Pulse Ox O2 Delivery O2 Flow Rate FiO2


 


6/14/17 08:49  87  109/45    


 


6/14/17 08:00      Nasal Cannula 2.0 


 


6/14/17 07:59 98.4  16  85   





 98.4       














Intake and Output 


 


 6/14/17





 07:00


 


Intake Total 650 ml


 


Balance 650 ml


 


 


 


Intake Oral 600 ml


 


IV Total 50 ml


 


# Voids 4











Physical Exam


Abdomen:  Normal bowel sounds, Soft


Heart:  Regular rate, Normal S1, Normal S2


General:  Alert


HEENT:  Atraumatic


Lungs:  Clear to auscultation


MUSCULOSKELETAL:  No swelling


Neck:  Supple


Neuro:  Normal speech


Psych/Mental Status:  Mental status NL


Skin:  No breakdown


COMMENT


rt side weakness





Diagnosis


Problem List


Problems


Medical Problems:


(1) Altered mental status


Status: Acute  





(2) Fall


Status: Acute  





(3) UTI (urinary tract infection)


Status: Acute  











Assessment


Assessment


Problems


Medical Problems:


(1) Altered mental status


Status: Acute  





(2) Fall


Status: Acute  





(3) UTI (urinary tract infection)


Status: Acute  





FINAL IMPRESSION:


1. Cerebrovascular accident, right-sided weakness.


2.  Fall at home from bed.


3.  Chronic obstructive pulmonary disease, end-stage, on 5 liters oxygen.


4.  Paroxysmal atrial fibrillation.


5.  Osteoporosis.


6.  Anemia.


7.  History of lung cancer, had a radiation treatment 5 years ago.


8.  Urinary tract infection.  We will send UA and culture.  Start on Rocephin.





PLAN: cardiology consult for  a fib 


MRI brain showed cva acute.


rehab consult appreciated


echo done today


carotid  Doppler -ve.


cannot  take asa ,will start on plavix.


lipitor  for chol.


snu soon


spoke with family ,


rocephin for uti





At this time, admit to hospital.  CT head was done.  Neurology was


consulted.  Speech, PT, OT, and Rehab.


Problems:  





Plan


Plan of Care


Problems


Medical Problems:


(1) Altered mental status


Status: Acute  





(2) Fall


Status: Acute  





(3) UTI (urinary tract infection)


Status: Acute  








Comment


Review of Relevant


I have reviewed the following items shan (where applicable) has been applied.


Labs





Microbiology


6/12/17 Urine Culture - Preliminary, Resulted


          


6/12/17 Urine Culture Result 1 (RENAE) - Preliminary, Resulted


Medications





Current Medications


Atorvastatin Calcium (Lipitor) 10 mg QHS PO  Last administered on 6/13/17at 21:

24;  Start 6/13/17 at 21:00


Ceftriaxone Sodium 1 gm/ Sodium Chloride 50 ml @  100 mls/hr Q24H IV  Last 

administered on 6/13/17at 10:14;  Start 6/13/17 at 11:00


Clopidogrel Bisulfate (Plavix) 75 mg DAILYWBKFT PO  Last administered on 6/14/ 17at 08:48;  Start 6/14/17 at 08:00


Gadobutrol (Gadavist) 6 mmol 1X  ONCE IV  Last administered on 6/13/17at 15:34;

  Start 6/13/17 at 15:30;  Stop 6/13/17 at 15:31;  Status DC


Ondansetron HCl (Zofran Odt) 4 mg PRN Q8HRS  PRN PO NAUSEA;  Start 6/14/17 at 06

:00


Vitals/I & O





Vital Sign - Last 24 Hours








 6/13/17 6/13/17 6/13/17 6/13/17





 11:49 12:12 16:26 19:50


 


Temp    98.1





    98.1


 


Pulse    91


 


Resp    16


 


B/P (MAP)    115/56 (75)


 


Pulse Ox  90  


 


O2 Delivery Nasal Cannula  Nasal Cannula Nasal Cannula


 


O2 Flow Rate 2.0  2.0 2.0


 


    





    





 6/13/17 6/13/17 6/13/17 6/13/17





 20:00 21:24 22:24 23:12


 


Resp  17 20 


 


Pulse Ox    93


 


O2 Delivery Nasal Cannula Nasal Cannula Nasal Cannula BiPAP/CPAP


 


O2 Flow Rate 2.0 2.0 2.0 





 6/13/17 6/13/17 6/14/17 6/14/17





 23:37 23:50 02:15 03:05


 


Temp 98.1   





 98.1   


 


Pulse 76   


 


Resp 16   22


 


B/P (MAP) 118/67 (84)   


 


Pulse Ox 94 95 97 96


 


O2 Delivery Nasal Cannula BiPAP/CPAP BiPAP/CPAP BiPAP/CPAP


 


O2 Flow Rate 2.0   


 


    





    





 6/14/17 6/14/17 6/14/17 6/14/17





 03:33 05:29 07:59 08:00


 


Temp   98.4 





   98.4 


 


Pulse   87 


 


Resp   16 


 


B/P (MAP)   109/45 (66) 


 


Pulse Ox 95 95 85 


 


O2 Delivery BiPAP/CPAP BiPAP/CPAP Nasal Cannula Nasal Cannula


 


O2 Flow Rate   2.0 2.0


 


    





    





 6/14/17   





 08:49   


 


Pulse 87   


 


B/P (MAP) 109/45   














Intake and Output   


 


 6/13/17 6/13/17 6/14/17





 15:00 23:00 07:00


 


Intake Total 50 ml 600 ml 


 


Balance 50 ml 600 ml 

















SUMMER TABOR MD Jun 14, 2017 10:17

## 2017-06-14 NOTE — PDOC
PROGRESS NOTES


Assessment


Assessment


Metabolic encephalopathy.


Fall


UTI


AFib


COPD


Hypothyroidism


Lung cancer.


SOB





RECOMMENDATIONS/PLAN:


Treat medical diseases.


OT/PT








HCT no acute findings.





HISTORY OF THE PRESENT ILLNESS:


84-y-old female patient had a fall likely due to found on the floor from bed. 

She had mental status changes and was unable to recall the events. She was 

brought to the ER of [facility] for furtehr evaluation. She did not have focalized 

sensory or motor deficits noted.





PAST SURGICAL HISTORY:  


Lung cancer received radiation therapy.





PAST SURGERY HISTORY: 


Appendectomy, gallbladder surgery, , hysterectomy, tonsillectomy with


tympanoplasty, multiple compression fractures, back surgery x2, and hernia


repair x3.





ALLERGIES: 


ADHESIVES, ASPIRIN, AND MORPHINE.





PERSONAL HISTORY:  


She smoked for many years and quit recently, one pack at


least.  Denies alcohol or street drugs.





MEDICATIONS:


Refer to MAR





FAMILY HISTORY: 


Non contributory.





REVIEW OF SYSTEMS:


Constitutional: No malnutrition, weight loss, cachexia.


Head: No traumatic brain or head injury.


Skin: No edema, or rash.


Ear: No infection.


Eyes: No vision loss or color blindness.


Nose: No bleeding or purulent discharges.


Hearing: Hearing decrease.


Neck: No injury.


Breast: No history of cancer, masses,or discharges.


Cardiac: No MI, arrhythmia.


Pulmonary: COPD, lung cancer.


GI: No GI ulcer, GI bleeding.


Urinary/genital: UTI.


Endocrinologic: No cousin face, craniofacial dysmorphism, polydactyly.


Skeletomuscular: No muscular atrophy, deformity.


Neurological: see  HP.


Psychiatric: Denies drug use/abuse.


Otherwise, not pertinant14-point review of systems.





PHYSICAL EXAMINATION:   


General appearance is in no acute distress.  


HEENT:  Normocephalic and nontraumatic.  Eyes, nose, ears, and throat are 

unremarkable.  


Neck is supple. No lymphadenopathy. No crepitus. 


Cardiovascular:  S1, S2, irregular rate and rhythm. SM heart murmur 4/6.


Pulmonary:  Breathing sounds decreased to auscultation bilaterally. 


Abdomen:  Bowel sounds are positive.  


Extremities:  No rash, lesions, or edema.  


No restriction of range of motion





NEUROLOGICAL  EXAMINATION:


Awake.


Oriented to place and person but not to time.


PERRL.


EOMI.


CN: no focal findings.


Muscle tone: within normal.


Muscle strength: 4+


DTR: 2-


Plantar reflex: Neutral response bilaterally 


Gait: not examined in bed. 


Sensory exam: no abnormal findings.


No acute cerebellar signs elicited.


F-T-N test fine.





Objective


Objective





Vital Signs








  Date Time  Temp Pulse Resp B/P (MAP) Pulse Ox O2 Delivery O2 Flow Rate FiO2


 


17 16:32     93 BiPAP/CPAP  


 


17 14:00 99.1 91 20 83/54 (64)    





 99.1       


 


17 12:57       2.0 














Intake and Output 


 


 17





 07:00


 


Intake Total 650 ml


 


Balance 650 ml


 


 


 


Intake Oral 600 ml


 


IV Total 50 ml


 


# Voids 4











Vitals Signs


Vitals





 VS - Last 72 Hours, by Label








  Date Time  Temp Pulse Resp B/P (MAP) Pulse Ox O2 Delivery O2 Flow Rate FiO2


 


17 16:32     93 BiPAP/CPAP  


 


17 14:00 99.1 91 20 83/54 (64) 90 BiPAP/CPAP  





 99.1       


 


17 12:57     95 Nasal Cannula 2.0 


 


17 12:13     95 BiPAP/CPAP  


 


17 11:15 100.2 60 24 113/44 (67) 80 BiPAP/CPAP  





 100.2       


 


17 11:02      Nasal Cannula 2.0 


 


17 10:40     85 Nasal Cannula 2.0 


 


17 08:49  87  109/45    


 


17 08:00      Nasal Cannula 2.0 


 


17 07:59 98.4 87 16 109/45 (66) 85 Nasal Cannula 2.0 





 98.4       


 


17 05:29     95 BiPAP/CPAP  


 


17 03:33     95 BiPAP/CPAP  


 


17 03:05   22  96 BiPAP/CPAP  


 


17 02:15     97 BiPAP/CPAP  


 


17 23:50     95 BiPAP/CPAP  


 


17 23:37 98.1 76 16 118/67 (84) 94 Nasal Cannula 2.0 





 98.1       


 


17 23:12     93 BiPAP/CPAP  


 


17 22:24   20     


 


17 21:24   17   Nasal Cannula 2.0 


 


17 20:00      Nasal Cannula 2.0 


 


17 19:50 98.1 91 16 115/56 (75)  Nasal Cannula 2.0 





 98.1       


 


17 16:26      Nasal Cannula 2.0 


 


17 11:49      Nasal Cannula 2.0 


 


17 10:09     90 Nasal Cannula 2.0 


 


17 10:09  79  125/60    


 


17 08:00      Nasal Cannula 2.0 


 


17 07:23     90 Nasal Cannula 2.0 


 


17 07:00 98.5 79 20 125/60 (81) 90 Nasal Cannula 2.0 





 98.5       











Laboratory


Laboratory





Laboratory Tests








Test


  17


12:23


 


O2 Saturation 79 % (92-99) 


 


Arterial Blood pH


  7.33


(7.35-7.45)


 


Arterial Blood pCO2 at


Patient Temp 68 mmHg


(35-46)


 


Arterial Blood pO2 at Patient


Temp 49 mmHg


()


 


Arterial Blood HCO3


  35 mmol/L


(21-28)


 


Arterial Blood Base Excess


  7 mmol/L


(-3-3)








Microbiology


17 Urine Culture - Final, Complete


          


17 Urine Culture Result 1 (RENAE) - Final, Complete





Medication


Medications





Current Medications


Acetaminophen (Tylenol) 500 mg PRN Q6HRS  PRN PO MILD PAIN / TEMP Last 

administered on  12:45;  Start 17 at 11:30


Atorvastatin Calcium (Lipitor) 10 mg QHS PO  Last administered on  21:

24;  Start 17 at 21:00


Clopidogrel Bisulfate (Plavix) 75 mg DAILYWBKFT PO  Last administered on  08:48;  Start 17 at 08:00


Ondansetron HCl (Zofran Odt) 4 mg PRN Q8HRS  PRN PO NAUSEA;  Start 17 at 06

:00


Oxycodone/ Acetaminophen (Percocet 5/325) 1 tab PRN TID  PRN PO MODERATE PAIN;  

Start 17 at 11:30


Prednisone (Prednisone) 10 mg DAILY PO  Last administered on  12:44;  

Start 17 at 12:00





Comment


Review of Relevant


I have reviewed the following items shan (where applicable) has been applied.











DONNA GALVAN MD 2017 17:19

## 2017-06-14 NOTE — PDOC
PULMONARY PROGRESS NOTES


Subjective


back on BIPAP for low oxygen


Vitals





Vital Signs








  Date Time  Temp Pulse Resp B/P (MAP) Pulse Ox O2 Delivery O2 Flow Rate FiO2


 


6/14/17 11:15 100.2 60 24 113/44 (67) 80 BiPAP/CPAP  





 100.2       


 


6/14/17 11:02       2.0 








General:  No acute distress, Lethargic


HEENT:  Other


Lungs:  Other (decrease bs)


Cardiovascular:  S1, S2


Abdomen:  Soft, Non-tender


Extremities:  No Edema


Skin:  Warm


Labs





Laboratory Tests








Test


  6/13/17


03:25


 


White Blood Count


  5.8 x10^3/uL


(4.0-11.0)


 


Red Blood Count


  3.07 x10^6/uL


(3.50-5.40)


 


Hemoglobin


  8.6 g/dL


(12.0-15.5)


 


Hematocrit


  28.0 %


(36.0-47.0)


 


Mean Corpuscular Volume 91 fL () 


 


Mean Corpuscular Hemoglobin 28 pg (25-35) 


 


Mean Corpuscular Hemoglobin


Concent 31 g/dL


(31-37)


 


Red Cell Distribution Width


  17.1 %


(11.5-14.5)


 


Platelet Count


  121 x10^3/uL


(140-400)


 


Neutrophils (%) (Auto) 53 % (31-73) 


 


Lymphocytes (%) (Auto) 31 % (24-48) 


 


Monocytes (%) (Auto) 15 % (0-9) 


 


Eosinophils (%) (Auto) 2 % (0-3) 


 


Basophils (%) (Auto) 0 % (0-3) 


 


Neutrophils # (Auto)


  3.0 x10^3uL


(1.8-7.7)


 


Lymphocytes # (Auto)


  1.8 x10^3/uL


(1.0-4.8)


 


Monocytes # (Auto)


  0.9 x10^3/uL


(0.0-1.1)


 


Eosinophils # (Auto)


  0.1 x10^3/uL


(0.0-0.7)


 


Basophils # (Auto)


  0.0 x10^3/uL


(0.0-0.2)


 


Sodium Level


  143 mmol/L


(136-145)


 


Potassium Level


  4.4 mmol/L


(3.5-5.1)


 


Chloride Level


  101 mmol/L


()


 


Carbon Dioxide Level


  43 mmol/L


(21-32)


 


Anion Gap  (6-14) 


 


Blood Urea Nitrogen


  16 mg/dL


(7-20)


 


Creatinine


  0.9 mg/dL


(0.6-1.0)


 


Estimated GFR


(Cockcroft-Gault) 59.7 


 


 


Glucose Level


  89 mg/dL


(70-99)


 


Calcium Level


  8.5 mg/dL


(8.5-10.1)


 


Triglycerides Level


  92 mg/dL


(0-150)


 


Cholesterol Level


  207 mg/dL


(0-200)


 


LDL Cholesterol, Calculated


  100 mg/dL


(0-100)


 


VLDL Cholesterol, Calculated


  18 mg/dL


(0-40)


 


Non-HDL Cholesterol Calculated


  118 mg/dL


(0-129)


 


HDL Cholesterol


  89 mg/dL


(40-60)


 


Cholesterol/HDL Ratio 2.3 








Medications





Active Scripts








 Medications  Dose


 Route/Sig


 Max Daily Dose Days Date Category


 


 Cefpodoxime


 Proxetil 100 Mg


 Tablet  100 Mg


 PO BID


   3 5/18/17 Rx


 


 Promethazine Hcl


 25 Mg Supp.rect  25 Mg


 RC Q6H PRN


    3/10/17 Rx


 


 Zofran Odt


  (Ondansetron) 4


 Mg Tab.rapdis  1 Tab


 SL Q8HRS


    3/5/17 Rx


 


 Duoneb 0.5-3(2.5)


 Mg/3 Ml


  (Albuterol/Ipratropium)


 3 Ml Ampul.neb  3 Ml


 NEB QID


    3/1/17 Reported


 


 Vitamin B-12


  (Cyanocobalamin


  (Vitamin B-12))


 1,000 Mcg Tablet  1 Tab


 PO DAILY


    1/19/17 Rx


 


 Senna


  (Sennosides) 8.6


 Mg Tablet  8.6 Mg


 PO DAILY


    9/14/16 Rx


 


 Docusate Sodium


 100 Mg Capsule  1 Cap


 PO DAILY


    9/14/16 Rx


 


 Trazodone Hcl 100


 Mg Tablet  1 Tab


 PO QHS


    9/1/16 Reported


 


 Vitamin D


  (Cholecalciferol


  (Vitamin D3))


 1,000 Unit Capsule  1 Cap


 PO DAILY


    9/1/16 Reported


 


 Percocet 


 Mg Tablet


  (Oxycodone/Acetaminophen)


 1 Each Tablet  1 Tab


 PO TID PRN


    6/8/16 Rx


 


 Cranberry


  (Cranberry Fruit)


 400 Mg Tablet  4,200 Mg


 PO DAILY


    6/1/16 Reported


 


 Levothyroxine


 Sodium 88 Mcg


 Tablet  1 Tab


 PO DAILY


    5/31/16 Reported


 


 Alprazolam 0.25


 Mg Tablet  1 Tab


 PO PRN TID PRN


    5/31/16 Reported


 


 Digoxin 125 Mcg


 Tablet  1 Tab


 PO DAILY


    6/1/15 Reported


 


 Zofran Odt


  (Ondansetron) 4


 Mg Tab.rapdis  1 Tab


 SL Q8HRS PRN


    6/1/15 Reported


 


 Escitalopram


 Oxalate 10 Mg


 Tablet  20 Mg


 PO DAILY


    6/8/14 Reported











Impression


.


1.  Acute on chronic hypoxemic hypercapnic respiratory failure.


2.  Encephalopathy, suspect secondary to CO2 narcosis. improved.


3.  Fall, possibly related to CO2 narcosis.


4.  Possible cerebrovascular accident.


5.  Chronic obstructive pulmonary disease, on chronic oxygen supplementation at


5 liters.


6.  History of lung cancer status post radiation treatment 5 years ago.





Plan


.





1.  d/w RN. ok to titrate FIO2 to keep sats around 90%


2.  P.r.n. BiPAP.


3.  ABG later today


4.  Continue current bronchodilators.











EDDIE ANDRE MD Jun 14, 2017 12:11

## 2017-06-15 VITALS — SYSTOLIC BLOOD PRESSURE: 125 MMHG | DIASTOLIC BLOOD PRESSURE: 47 MMHG

## 2017-06-15 VITALS — SYSTOLIC BLOOD PRESSURE: 126 MMHG | DIASTOLIC BLOOD PRESSURE: 74 MMHG

## 2017-06-15 VITALS — DIASTOLIC BLOOD PRESSURE: 63 MMHG | SYSTOLIC BLOOD PRESSURE: 147 MMHG

## 2017-06-15 VITALS — SYSTOLIC BLOOD PRESSURE: 111 MMHG | DIASTOLIC BLOOD PRESSURE: 57 MMHG

## 2017-06-15 VITALS — DIASTOLIC BLOOD PRESSURE: 59 MMHG | SYSTOLIC BLOOD PRESSURE: 110 MMHG

## 2017-06-15 VITALS — SYSTOLIC BLOOD PRESSURE: 156 MMHG | DIASTOLIC BLOOD PRESSURE: 84 MMHG

## 2017-06-15 RX ADMIN — IPRATROPIUM BROMIDE AND ALBUTEROL SULFATE SCH ML: .5; 3 SOLUTION RESPIRATORY (INHALATION) at 03:59

## 2017-06-15 RX ADMIN — TRAZODONE HYDROCHLORIDE SCH MG: 100 TABLET ORAL at 20:49

## 2017-06-15 RX ADMIN — DOCUSATE SODIUM SCH MG: 100 CAPSULE, LIQUID FILLED ORAL at 08:24

## 2017-06-15 RX ADMIN — ESCITALOPRAM OXALATE SCH MG: 10 TABLET ORAL at 08:24

## 2017-06-15 RX ADMIN — IPRATROPIUM BROMIDE AND ALBUTEROL SULFATE SCH ML: .5; 3 SOLUTION RESPIRATORY (INHALATION) at 19:02

## 2017-06-15 RX ADMIN — CLOPIDOGREL BISULFATE SCH MG: 75 TABLET ORAL at 08:23

## 2017-06-15 RX ADMIN — LEVOTHYROXINE SODIUM SCH MCG: 88 TABLET ORAL at 06:30

## 2017-06-15 RX ADMIN — VITAMIN D, TAB 1000IU (100/BT) SCH UNIT: 25 TAB at 08:24

## 2017-06-15 RX ADMIN — IPRATROPIUM BROMIDE AND ALBUTEROL SULFATE SCH ML: .5; 3 SOLUTION RESPIRATORY (INHALATION) at 11:51

## 2017-06-15 RX ADMIN — ACETAMINOPHEN PRN MG: 500 TABLET ORAL at 20:49

## 2017-06-15 RX ADMIN — DIGOXIN SCH MCG: 125 TABLET ORAL at 08:24

## 2017-06-15 RX ADMIN — IPRATROPIUM BROMIDE AND ALBUTEROL SULFATE SCH ML: .5; 3 SOLUTION RESPIRATORY (INHALATION) at 07:59

## 2017-06-15 RX ADMIN — IPRATROPIUM BROMIDE AND ALBUTEROL SULFATE SCH ML: .5; 3 SOLUTION RESPIRATORY (INHALATION) at 15:59

## 2017-06-15 RX ADMIN — SENNOSIDES A AND B SCH MG: 8.6 TABLET, FILM COATED ORAL at 08:23

## 2017-06-15 RX ADMIN — CYANOCOBALAMIN TAB 1000 MCG SCH MCG: 1000 TAB at 08:24

## 2017-06-15 RX ADMIN — ATORVASTATIN CALCIUM SCH MG: 10 TABLET, FILM COATED ORAL at 20:49

## 2017-06-15 RX ADMIN — IPRATROPIUM BROMIDE AND ALBUTEROL SULFATE SCH ML: .5; 3 SOLUTION RESPIRATORY (INHALATION) at 00:16

## 2017-06-15 NOTE — PDOC
PROGRESS NOTES


Subjective


Subjective


Patient more alert today and more talkative.





The echocardiogram shows a normal systolic left ventricular function.





Objective


Objective





Vital Signs








  Date Time  Temp Pulse Resp B/P (MAP) Pulse Ox O2 Delivery O2 Flow Rate FiO2


 


6/15/17 16:01      Nasal Cannula 3.0 


 


6/15/17 14:21 98.1 90 18 125/47 (73) 97   





 98.1       














Intake and Output 


 


 6/15/17





 07:00


 


Intake Total 340 ml


 


Balance 340 ml


 


 


 


Intake Oral 340 ml


 


# Voids 2











Physical Exam


Physical Exam


No significant changes in cardiac exam





Assessment


Assessment


From a cardiac standpoint to me this patient's atrial fibrillation is a chronic 

problem and it is paroxysmal, it has a controlled ventricular response and 

therefore I do not think that any changes in medications 





are required at this point.





If the patient is not able to take aspirin then I agree with using Plavix.





This patient is a DNR.





I agree with the present plan.





We will sign off unless further cardiac problems develop.





Thank you very much for asking me to participate in the care of this patient.





Comment


Review of Relevant


I have reviewed the following items shan (where applicable) has been applied.


Labs





Laboratory Tests








Test


  6/14/17


12:23


 


O2 Saturation 79 % (92-99) 


 


Arterial Blood pH


  7.33


(7.35-7.45)


 


Arterial Blood pCO2 at


Patient Temp 68 mmHg


(35-46)


 


Arterial Blood pO2 at Patient


Temp 49 mmHg


()


 


Arterial Blood HCO3


  35 mmol/L


(21-28)


 


Arterial Blood Base Excess


  7 mmol/L


(-3-3)








Microbiology


6/12/17 Urine Culture - Final, Complete


          


6/12/17 Urine Culture Result 1 (RENAE) - Final, Complete


Medications





Current Medications


Ceftriaxone Sodium 50 ml @  100 mls/hr 1X  ONCE IV  Last administered on 6/12/ 17at 08:30;  Start 6/12/17 at 08:30;  Stop 6/12/17 at 08:59;  Status DC


Sodium Chloride 1,000 ml @  75 mls/hr [id number] IV  Last administered on 6/12/ 17at 09:10;  Start 6/12/17 at 09:10;  Stop 6/12/17 at 23:01;  Status DC


Ceftriaxone Sodium 1 gm/ Sodium Chloride 50 ml @  100 mls/hr Q24H IV  Last 

administered on 6/13/17at 10:14;  Start 6/13/17 at 11:00;  Stop 6/14/17 at 10:19

;  Status DC


Dextrose/Sodium Chloride 1,000 ml @  75 mls/hr [id number] IV  Last administered on 

6/14/17at 01:24;  Start 6/12/17 at 10:45;  Stop 6/14/17 at 10:19;  Status DC


Albuterol Sulfate (Ventolin Neb Soln) 2.5 mg PRN Q2HR  PRN NEB DYSPNEA;  Start 6 /12/17 at 17:45


Albuterol/ Ipratropium (Duoneb) 3 ml Q4HRS NEB  Last administered on 6/15/17at 

15:59;  Start 6/12/17 at 20:00


Alprazolam (Xanax) 0.25 mg PRN TID  PRN PO ANXIETY / AGITATION;  Start 6/12/17 

at 18:45


Cyanocobalamin (Vitamin B-12) 1,000 mcg DAILY PO  Last administered on 6/15/

17at 08:24;  Start 6/13/17 at 09:00


Digoxin (Lanoxin) 125 mcg DAILY PO  Last administered on 6/15/17at 08:24;  

Start 6/13/17 at 09:00


Docusate Sodium (Colace) 100 mg DAILY PO  Last administered on 6/15/17at 08:24;

  Start 6/13/17 at 09:00


Escitalopram Oxalate (Lexapro) 20 mg DAILY PO  Last administered on 6/15/17at 08

:24;  Start 6/13/17 at 09:00


Albuterol/ Ipratropium (Duoneb) 3 ml QID NEB ;  Start 6/12/17 at 21:00;  Status 

UNV


Levothyroxine Sodium (Synthroid) 88 mcg DAILY07 PO  Last administered on 6/15/

17at 06:30;  Start 6/13/17 at 07:00


Ondansetron HCl (Zofran Odt) 4 mg Q8HRS PO  Last administered on 6/13/17at 06:04

;  Start 6/12/17 at 22:00;  Stop 6/13/17 at 07:20;  Status DC


Oxycodone/ Acetaminophen (Percocet 10/325) 1 tab PRN TID  PRN PO SEVERE PAIN 

Last administered on 6/14/17at 10:40;  Start 6/12/17 at 18:45


Sennosides (Senna) 8.6 mg DAILY PO  Last administered on 6/15/17at 08:23;  

Start 6/13/17 at 09:00


Trazodone HCl (Desyrel) 100 mg QHS PO  Last administered on 6/14/17at 21:57;  

Start 6/12/17 at 21:00


Vitamin D (Vitamin D3) 1,000 unit DAILY PO  Last administered on 6/15/17at 08:24

;  Start 6/13/17 at 09:00


Non-Formulary Medication 4,200 mg DAILY PO ;  Start 6/13/17 at 09:00;  Status 

UNV


Albuterol/ Ipratropium (Duoneb) 3 ml PRN Q4HRS  PRN NEB SHORTNESS OF BREATH;  

Start 6/12/17 at 21:00;  Status UNV


Ondansetron HCl (Zofran Odt) 4 mg PRN Q8HRS  PRN PO NAUSEA;  Start 6/14/17 at 06

:00


Clopidogrel Bisulfate (Plavix) 75 mg DAILYWBKFT PO  Last administered on 6/15/

17at 08:23;  Start 6/14/17 at 08:00


Clopidogrel Bisulfate (Plavix) 75 mg 1X  ONCE PO  Last administered on 6/13/ 17at 10:31;  Start 6/13/17 at 10:15;  Stop 6/13/17 at 10:16;  Status DC


Atorvastatin Calcium (Lipitor) 10 mg QHS PO  Last administered on 6/14/17at 21:

57;  Start 6/13/17 at 21:00


Gadobutrol (Gadavist) 6 mmol 1X  ONCE IV  Last administered on 6/13/17at 15:34;

  Start 6/13/17 at 15:30;  Stop 6/13/17 at 15:31;  Status DC


Prednisone (Prednisone) 10 mg DAILY PO  Last administered on 6/15/17at 08:24;  

Start 6/14/17 at 12:00


Acetaminophen (Tylenol) 500 mg PRN Q6HRS  PRN PO MILD PAIN / TEMP Last 

administered on 6/14/17at 12:45;  Start 6/14/17 at 11:30


Oxycodone/ Acetaminophen (Percocet 5/325) 1 tab PRN TID  PRN PO MODERATE PAIN;  

Start 6/14/17 at 11:30





Active Scripts


Active


Zofran Odt (Ondansetron) 4 Mg Tab.rapdis 1 Tab SL Q8HRS


Vitamin B-12 (Cyanocobalamin (Vitamin B-12)) 1,000 Mcg Tablet 1 Tab PO DAILY


Senna (Sennosides) 8.6 Mg Tablet 8.6 Mg PO DAILY


Docusate Sodium 100 Mg Capsule 1 Cap PO DAILY


Percocet  Mg Tablet (Oxycodone/Acetaminophen) 1 Each Tablet 1 Tab PO TID 

PRN


Reported


Duoneb 0.5-3(2.5) Mg/3 Ml (Albuterol/Ipratropium) 3 Ml Ampul.neb 3 Ml NEB QID


Trazodone Hcl 100 Mg Tablet 1 Tab PO QHS


Vitamin D (Cholecalciferol (Vitamin D3)) 1,000 Unit Capsule 1 Cap PO DAILY


Cranberry (Cranberry Fruit) 400 Mg Tablet 4,200 Mg PO DAILY


Levothyroxine Sodium 88 Mcg Tablet 1 Tab PO DAILY


Alprazolam 0.25 Mg Tablet 1 Tab PO PRN TID PRN


Digoxin 125 Mcg Tablet 1 Tab PO DAILY


Escitalopram Oxalate 10 Mg Tablet 20 Mg PO DAILY


Vitals/I & O





Vital Sign - Last 24 Hours








 6/14/17 6/14/17 6/14/17 6/14/17





 16:32 19:12 19:14 20:00


 


Temp  99.1  





  99.1  


 


Pulse  120  


 


Resp  16  


 


B/P (MAP)  103/61 (75)  


 


Pulse Ox 93 93 91 


 


O2 Delivery BiPAP/CPAP Nasal Cannula Nasal Cannula Nasal Cannula


 


O2 Flow Rate  2.0 3.0 3.0


 


    





    





 6/14/17 6/15/17 6/15/17 6/15/17





 23:09 00:16 02:52 03:59


 


Temp 98.1  98.1 





 98.1  98.1 


 


Pulse 79  73 


 


Resp 16  16 


 


B/P (MAP) 108/56 (73)  156/84 (108) 


 


Pulse Ox 94 92 96 91


 


O2 Delivery Nasal Cannula Nasal Cannula Nasal Cannula Nasal Cannula


 


O2 Flow Rate 3.0 3.0 3.0 3.0


 


    





    





 6/15/17 6/15/17 6/15/17 6/15/17





 07:57 08:00 08:01 08:24


 


Temp 98.1   





 98.1   


 


Pulse 60   60


 


Resp 16   


 


B/P (MAP) 111/57 (75)   111/57


 


Pulse Ox 96  92 


 


O2 Delivery Nasal Cannula Nasal Cannula Nasal Cannula 


 


O2 Flow Rate 3.0 3.0 3.0 


 


    





    





 6/15/17 6/15/17 6/15/17 6/15/17





 10:50 11:52 14:21 16:01


 


Temp 98.8  98.1 





 98.8  98.1 


 


Pulse 77  90 


 


Resp 18  18 


 


B/P (MAP) 110/59 (76)  125/47 (73) 


 


Pulse Ox 97  97 


 


O2 Delivery Nasal Cannula Nasal Cannula Nasal Cannula Nasal Cannula


 


O2 Flow Rate 3.0 3.0 3.0 3.0














Intake and Output   


 


 6/14/17 6/14/17 6/15/17





 15:00 23:00 07:00


 


Intake Total   340 ml


 


Balance   340 ml

















GRUPO NOLEN MD [place] 15, 2017 16:14

## 2017-06-15 NOTE — PDOC
PULMONARY PROGRESS NOTES


Subjective


more awake, off BIPAP


Vitals





Vital Signs








  Date Time  Temp Pulse Resp B/P (MAP) Pulse Ox O2 Delivery O2 Flow Rate FiO2


 


6/15/17 08:24  60  111/57    


 


6/15/17 08:01     92 Nasal Cannula 3.0 


 


6/15/17 07:57 98.1  16     





 98.1       








General:  Alert, No acute distress


HEENT:  Other


Lungs:  Other (decrease bs)


Cardiovascular:  S1, S2


Abdomen:  Soft, Non-tender


Extremities:  No Edema


Skin:  Warm


Labs





Laboratory Tests








Test


  6/14/17


12:23


 


O2 Saturation 79 % (92-99) 


 


Arterial Blood pH


  7.33


(7.35-7.45)


 


Arterial Blood pCO2 at


Patient Temp 68 mmHg


(35-46)


 


Arterial Blood pO2 at Patient


Temp 49 mmHg


()


 


Arterial Blood HCO3


  35 mmol/L


(21-28)


 


Arterial Blood Base Excess


  7 mmol/L


(-3-3)








Laboratory Tests








Test


  6/14/17


12:23


 


O2 Saturation 79 % (92-99) 


 


Arterial Blood pH


  7.33


(7.35-7.45)


 


Arterial Blood pCO2 at


Patient Temp 68 mmHg


(35-46)


 


Arterial Blood pO2 at Patient


Temp 49 mmHg


()


 


Arterial Blood HCO3


  35 mmol/L


(21-28)


 


Arterial Blood Base Excess


  7 mmol/L


(-3-3)








Medications





Active Scripts








 Medications  Dose


 Route/Sig


 Max Daily Dose Days Date Category


 


 Cefpodoxime


 Proxetil 100 Mg


 Tablet  100 Mg


 PO BID


   3 5/18/17 Rx


 


 Promethazine Hcl


 25 Mg Supp.rect  25 Mg


 RC Q6H PRN


    3/10/17 Rx


 


 Zofran Odt


  (Ondansetron) 4


 Mg Tab.rapdis  1 Tab


 SL Q8HRS


    3/5/17 Rx


 


 Duoneb 0.5-3(2.5)


 Mg/3 Ml


  (Albuterol/Ipratropium)


 3 Ml Ampul.neb  3 Ml


 NEB QID


    3/1/17 Reported


 


 Vitamin B-12


  (Cyanocobalamin


  (Vitamin B-12))


 1,000 Mcg Tablet  1 Tab


 PO DAILY


    1/19/17 Rx


 


 Senna


  (Sennosides) 8.6


 Mg Tablet  8.6 Mg


 PO DAILY


    9/14/16 Rx


 


 Docusate Sodium


 100 Mg Capsule  1 Cap


 PO DAILY


    9/14/16 Rx


 


 Trazodone Hcl 100


 Mg Tablet  1 Tab


 PO QHS


    9/1/16 Reported


 


 Vitamin D


  (Cholecalciferol


  (Vitamin D3))


 1,000 Unit Capsule  1 Cap


 PO DAILY


    9/1/16 Reported


 


 Percocet 


 Mg Tablet


  (Oxycodone/Acetaminophen)


 1 Each Tablet  1 Tab


 PO TID PRN


    6/8/16 Rx


 


 Cranberry


  (Cranberry Fruit)


 400 Mg Tablet  4,200 Mg


 PO DAILY


    6/1/16 Reported


 


 Levothyroxine


 Sodium 88 Mcg


 Tablet  1 Tab


 PO DAILY


    5/31/16 Reported


 


 Alprazolam 0.25


 Mg Tablet  1 Tab


 PO PRN TID PRN


    5/31/16 Reported


 


 Digoxin 125 Mcg


 Tablet  1 Tab


 PO DAILY


    6/1/15 Reported


 


 Zofran Odt


  (Ondansetron) 4


 Mg Tab.rapdis  1 Tab


 SL Q8HRS PRN


    6/1/15 Reported


 


 Escitalopram


 Oxalate 10 Mg


 Tablet  20 Mg


 PO DAILY


    6/8/14 Reported











Impression


.


1.  Acute on chronic hypoxemic hypercapnic respiratory failure.


2.  Encephalopathy, suspect secondary to CO2 narcosis. improved.


3.  Fall, possibly related to CO2 narcosis.


4.  Possible cerebrovascular accident.


5.  Chronic obstructive pulmonary disease, on chronic oxygen supplementation at


5 liters.


6.  History of lung cancer status post radiation treatment 5 years ago.





Plan


.





1.  d/w RN. ok to titrate FIO2 to keep sats around 90%/ avoid narcotics


2.  P.r.n. BiPAP.


3.  ABG prn


4.  Continue current bronchodilators.


5.  clinically better











EDDIE ANDRE MD Chucky 15, 2017 09:03

## 2017-06-15 NOTE — PDOC
PROGRESS NOTES


Subjective


Subjective


No new complaints.





Objective


Objective





Vital Signs








  Date Time  Temp Pulse Resp B/P (MAP) Pulse Ox O2 Delivery O2 Flow Rate FiO2


 


6/15/17 08:24  60  111/57    


 


6/15/17 08:01     92 Nasal Cannula 3.0 


 


6/15/17 07:57 98.1  16     





 98.1       














Intake and Output 


 


 6/15/17





 06:59


 


Intake Total 340 ml


 


Balance 340 ml


 


 


 


Intake Oral 340 ml


 


# Voids 2











Physical Exam


Physical Exam


She had return of motor function in her right lower extremity she developed 

from CVA and she got up and walked at bedside with roller walker under 

supervision.She gets SOB with any exertion and her oxygen saturation remains 

around 87% on 3 liters of oxygen by nasal canula.





Assessment


Assessment


Problems


Medical Problems:


(1) Altered mental status


Status: Acute  





(2) Fall


Status: Acute  





(3) UTI (urinary tract infection)


Status: Acute  











Plan


Plan of Care


To SNF when medically stable.





Comment


Review of Relevant


I have reviewed the following items shan (where applicable) has been applied.


Labs





Laboratory Tests








Test


  6/14/17


12:23


 


O2 Saturation 79 % (92-99) 


 


Arterial Blood pH


  7.33


(7.35-7.45)


 


Arterial Blood pCO2 at


Patient Temp 68 mmHg


(35-46)


 


Arterial Blood pO2 at Patient


Temp 49 mmHg


()


 


Arterial Blood HCO3


  35 mmol/L


(21-28)


 


Arterial Blood Base Excess


  7 mmol/L


(-3-3)








Laboratory Tests








Test


  6/14/17


12:23


 


O2 Saturation 79 % (92-99) 


 


Arterial Blood pH


  7.33


(7.35-7.45)


 


Arterial Blood pCO2 at


Patient Temp 68 mmHg


(35-46)


 


Arterial Blood pO2 at Patient


Temp 49 mmHg


()


 


Arterial Blood HCO3


  35 mmol/L


(21-28)


 


Arterial Blood Base Excess


  7 mmol/L


(-3-3)








Microbiology


6/12/17 Urine Culture - Final, Complete


          


6/12/17 Urine Culture Result 1 (RENAE) - Final, Complete


Medications





Current Medications


Ceftriaxone Sodium 50 ml @  100 mls/hr 1X  ONCE IV  Last administered on 6/12/ 17at 08:30;  Start 6/12/17 at 08:30;  Stop 6/12/17 at 08:59;  Status DC


Sodium Chloride 1,000 ml @  75 mls/hr [id number] IV  Last administered on 6/12/ 17at 09:10;  Start 6/12/17 at 09:10;  Stop 6/12/17 at 23:01;  Status DC


Ceftriaxone Sodium 1 gm/ Sodium Chloride 50 ml @  100 mls/hr Q24H IV  Last 

administered on 6/13/17at 10:14;  Start 6/13/17 at 11:00;  Stop 6/14/17 at 10:19

;  Status DC


Dextrose/Sodium Chloride 1,000 ml @  75 mls/hr [id number] IV  Last administered on 

6/14/17at 01:24;  Start 6/12/17 at 10:45;  Stop 6/14/17 at 10:19;  Status DC


Albuterol Sulfate (Ventolin Neb Soln) 2.5 mg PRN Q2HR  PRN NEB DYSPNEA;  Start 6 /12/17 at 17:45


Albuterol/ Ipratropium (Duoneb) 3 ml Q4HRS NEB  Last administered on 6/15/17at 

07:59;  Start 6/12/17 at 20:00


Alprazolam (Xanax) 0.25 mg PRN TID  PRN PO ANXIETY / AGITATION;  Start 6/12/17 

at 18:45


Cyanocobalamin (Vitamin B-12) 1,000 mcg DAILY PO  Last administered on 6/15/

17at 08:24;  Start 6/13/17 at 09:00


Digoxin (Lanoxin) 125 mcg DAILY PO  Last administered on 6/15/17at 08:24;  

Start 6/13/17 at 09:00


Docusate Sodium (Colace) 100 mg DAILY PO  Last administered on 6/15/17at 08:24;

  Start 6/13/17 at 09:00


Escitalopram Oxalate (Lexapro) 20 mg DAILY PO  Last administered on 6/15/17at 08

:24;  Start 6/13/17 at 09:00


Albuterol/ Ipratropium (Duoneb) 3 ml QID NEB ;  Start 6/12/17 at 21:00;  Status 

UNV


Levothyroxine Sodium (Synthroid) 88 mcg DAILY07 PO  Last administered on 6/15/

17at 06:30;  Start 6/13/17 at 07:00


Ondansetron HCl (Zofran Odt) 4 mg Q8HRS PO  Last administered on 6/13/17at 06:04

;  Start 6/12/17 at 22:00;  Stop 6/13/17 at 07:20;  Status DC


Oxycodone/ Acetaminophen (Percocet 10/325) 1 tab PRN TID  PRN PO SEVERE PAIN 

Last administered on 6/14/17at 10:40;  Start 6/12/17 at 18:45


Sennosides (Senna) 8.6 mg DAILY PO  Last administered on 6/15/17at 08:23;  

Start 6/13/17 at 09:00


Trazodone HCl (Desyrel) 100 mg QHS PO  Last administered on 6/14/17at 21:57;  

Start 6/12/17 at 21:00


Vitamin D (Vitamin D3) 1,000 unit DAILY PO  Last administered on 6/15/17at 08:24

;  Start 6/13/17 at 09:00


Non-Formulary Medication 4,200 mg DAILY PO ;  Start 6/13/17 at 09:00;  Status 

UNV


Albuterol/ Ipratropium (Duoneb) 3 ml PRN Q4HRS  PRN NEB SHORTNESS OF BREATH;  

Start 6/12/17 at 21:00;  Status UNV


Ondansetron HCl (Zofran Odt) 4 mg PRN Q8HRS  PRN PO NAUSEA;  Start 6/14/17 at 06

:00


Clopidogrel Bisulfate (Plavix) 75 mg DAILYWBKFT PO  Last administered on 6/15/

17at 08:23;  Start 6/14/17 at 08:00


Clopidogrel Bisulfate (Plavix) 75 mg 1X  ONCE PO  Last administered on 6/13/ 17at 10:31;  Start 6/13/17 at 10:15;  Stop 6/13/17 at 10:16;  Status DC


Atorvastatin Calcium (Lipitor) 10 mg QHS PO  Last administered on 6/14/17at 21:

57;  Start 6/13/17 at 21:00


Gadobutrol (Gadavist) 6 mmol 1X  ONCE IV  Last administered on 6/13/17at 15:34;

  Start 6/13/17 at 15:30;  Stop 6/13/17 at 15:31;  Status DC


Prednisone (Prednisone) 10 mg DAILY PO  Last administered on 6/15/17at 08:24;  

Start 6/14/17 at 12:00


Acetaminophen (Tylenol) 500 mg PRN Q6HRS  PRN PO MILD PAIN / TEMP Last 

administered on 6/14/17at 12:45;  Start 6/14/17 at 11:30


Oxycodone/ Acetaminophen (Percocet 5/325) 1 tab PRN TID  PRN PO MODERATE PAIN;  

Start 6/14/17 at 11:30





Active Scripts


Active


Zofran Odt (Ondansetron) 4 Mg Tab.rapdis 1 Tab SL Q8HRS


Vitamin B-12 (Cyanocobalamin (Vitamin B-12)) 1,000 Mcg Tablet 1 Tab PO DAILY


Senna (Sennosides) 8.6 Mg Tablet 8.6 Mg PO DAILY


Docusate Sodium 100 Mg Capsule 1 Cap PO DAILY


Percocet  Mg Tablet (Oxycodone/Acetaminophen) 1 Each Tablet 1 Tab PO TID 

PRN


Reported


Duoneb 0.5-3(2.5) Mg/3 Ml (Albuterol/Ipratropium) 3 Ml Ampul.neb 3 Ml NEB QID


Trazodone Hcl 100 Mg Tablet 1 Tab PO QHS


Vitamin D (Cholecalciferol (Vitamin D3)) 1,000 Unit Capsule 1 Cap PO DAILY


Cranberry (Cranberry Fruit) 400 Mg Tablet 4,200 Mg PO DAILY


Levothyroxine Sodium 88 Mcg Tablet 1 Tab PO DAILY


Alprazolam 0.25 Mg Tablet 1 Tab PO PRN TID PRN


Digoxin 125 Mcg Tablet 1 Tab PO DAILY


Escitalopram Oxalate 10 Mg Tablet 20 Mg PO DAILY


Vitals/I & O





Vital Sign - Last 24 Hours








 6/14/17 6/14/17 6/14/17 6/14/17





 10:40 11:02 11:15 12:13


 


Temp   100.2 





   100.2 


 


Pulse   60 


 


Resp   24 


 


B/P (MAP)   113/44 (67) 


 


Pulse Ox 85  80 95


 


O2 Delivery Nasal Cannula Nasal Cannula BiPAP/CPAP BiPAP/CPAP


 


O2 Flow Rate 2.0 2.0  


 


    





    





 6/14/17 6/14/17 6/14/17 6/14/17





 12:57 14:00 16:32 19:12


 


Temp  99.1  99.1





  99.1  99.1


 


Pulse  91  120


 


Resp  20  16


 


B/P (MAP)  83/54 (64)  103/61 (75)


 


Pulse Ox 95 90 93 93


 


O2 Delivery Nasal Cannula BiPAP/CPAP BiPAP/CPAP Nasal Cannula


 


O2 Flow Rate 2.0   2.0


 


    





    





 6/14/17 6/14/17 6/14/17 6/15/17





 19:14 20:00 23:09 00:16


 


Temp   98.1 





   98.1 


 


Pulse   79 


 


Resp   16 


 


B/P (MAP)   108/56 (73) 


 


Pulse Ox 91  94 92


 


O2 Delivery Nasal Cannula Nasal Cannula Nasal Cannula Nasal Cannula


 


O2 Flow Rate 3.0 3.0 3.0 3.0


 


    





    





 6/15/17 6/15/17 6/15/17 6/15/17





 02:52 03:59 07:57 08:01


 


Temp 98.1  98.1 





 98.1  98.1 


 


Pulse 73  60 


 


Resp 16  16 


 


B/P (MAP) 156/84 (108)  111/57 (75) 


 


Pulse Ox 96 91 96 92


 


O2 Delivery Nasal Cannula Nasal Cannula Nasal Cannula Nasal Cannula


 


O2 Flow Rate 3.0 3.0 3.0 3.0


 


    





    





 6/15/17   





 08:24   


 


Pulse 60   


 


B/P (MAP) 111/57   














Intake and Output   


 


 6/14/17 6/14/17 6/15/17





 14:59 22:59 06:59


 


Intake Total   340 ml


 


Balance   340 ml

















ROBSON CARREON MD [place] 15, 2017 10:03

## 2017-06-15 NOTE — PDOC
PROGRESS NOTES


Subjective


Subjective


feeling much better





Objective


Objective





Vital Signs








  Date Time  Temp Pulse Resp B/P (MAP) Pulse Ox O2 Delivery O2 Flow Rate FiO2


 


6/15/17 08:24  60  111/57    


 


6/15/17 08:01     92 Nasal Cannula 3.0 


 


6/15/17 07:57 98.1  16     





 98.1       














Intake and Output 


 


 6/15/17





 07:00


 


Intake Total 340 ml


 


Balance 340 ml


 


 


 


Intake Oral 340 ml


 


# Voids 2











Physical Exam


Abdomen:  Normal bowel sounds, Soft


Heart:  Regular rate, Normal S1, Normal S2


General:  Alert


HEENT:  Atraumatic


Lungs:  Clear to auscultation


MUSCULOSKELETAL:  No swelling


Neck:  Supple


Neuro:  Normal speech


Psych/Mental Status:  Mental status NL


Skin:  No breakdown


COMMENT


rt side weakness improving





Diagnosis


Problem List


Problems


Medical Problems:


(1) Altered mental status


Status: Acute  





(2) Fall


Status: Acute  





(3) UTI (urinary tract infection)


Status: Acute  








Assessment


Assessment


Problems


Medical Problems:


(1) Altered mental status


Status: Acute  





(2) Fall


Status: Acute  





(3) UTI (urinary tract infection)


Status: Acute  





FINAL IMPRESSION:


1. Cerebrovascular accident, right-sided weakness.


2.  Fall at home from bed.


3.  Chronic obstructive pulmonary disease, end-stage, on 5 liters oxygen.


4.  Paroxysmal atrial fibrillation.


5.  Osteoporosis.


6.  Anemia.


7.  History of lung cancer, had a radiation treatment 5 years ago.


8.  Urinary tract infection.  We will send UA and culture.  Start on Rocephin.





PLAN: cardiology consult for  a fib appreciated, not  a  candidate  for 

anticoagulation.


MRI brain showed + cva acute.


rehab consult appreciated


echo done today good LVF, no thrombus


carotid  Doppler -ve.


cannot  take asa ,will start on plavix.


lipitor  for chol.


snu soon ,?tomorrow.


needs BIPAP prn


d/c Rocephin- urine c/s neg








At this time, admit to hospital.  CT head was done.  Neurology was


consulted.  Speech, PT, OT, and Rehab.


Problems:  





Plan


Plan of Care


Problems


Medical Problems:


(1) Altered mental status


Status: Acute  





(2) Fall


Status: Acute  





(3) UTI (urinary tract infection)


Status: Acute  








Comment


Review of Relevant


I have reviewed the following items shan (where applicable) has been applied.


Labs





Laboratory Tests








Test


  6/14/17


12:23


 


O2 Saturation 79 % (92-99) 


 


Arterial Blood pH


  7.33


(7.35-7.45)


 


Arterial Blood pCO2 at


Patient Temp 68 mmHg


(35-46)


 


Arterial Blood pO2 at Patient


Temp 49 mmHg


()


 


Arterial Blood HCO3


  35 mmol/L


(21-28)


 


Arterial Blood Base Excess


  7 mmol/L


(-3-3)








Microbiology


6/12/17 Urine Culture - Final, Complete


          


6/12/17 Urine Culture Result 1 (RENAE) - Final, Complete


Medications





Current Medications


Acetaminophen (Tylenol) 500 mg PRN Q6HRS  PRN PO MILD PAIN / TEMP Last 

administered on 6/14/17at 12:45;  Start 6/14/17 at 11:30


Oxycodone/ Acetaminophen (Percocet 5/325) 1 tab PRN TID  PRN PO MODERATE PAIN;  

Start 6/14/17 at 11:30


Prednisone (Prednisone) 10 mg DAILY PO  Last administered on 6/15/17at 08:24;  

Start 6/14/17 at 12:00


Vitals/I & O





Vital Sign - Last 24 Hours








 6/14/17 6/14/17 6/14/17 6/14/17





 10:40 11:02 11:15 12:13


 


Temp   100.2 





   100.2 


 


Pulse   60 


 


Resp   24 


 


B/P (MAP)   113/44 (67) 


 


Pulse Ox 85  80 95


 


O2 Delivery Nasal Cannula Nasal Cannula BiPAP/CPAP BiPAP/CPAP


 


O2 Flow Rate 2.0 2.0  


 


    





    





 6/14/17 6/14/17 6/14/17 6/14/17





 12:57 14:00 16:32 19:12


 


Temp  99.1  99.1





  99.1  99.1


 


Pulse  91  120


 


Resp  20  16


 


B/P (MAP)  83/54 (64)  103/61 (75)


 


Pulse Ox 95 90 93 93


 


O2 Delivery Nasal Cannula BiPAP/CPAP BiPAP/CPAP Nasal Cannula


 


O2 Flow Rate 2.0   2.0


 


    





    





 6/14/17 6/14/17 6/14/17 6/15/17





 19:14 20:00 23:09 00:16


 


Temp   98.1 





   98.1 


 


Pulse   79 


 


Resp   16 


 


B/P (MAP)   108/56 (73) 


 


Pulse Ox 91  94 92


 


O2 Delivery Nasal Cannula Nasal Cannula Nasal Cannula Nasal Cannula


 


O2 Flow Rate 3.0 3.0 3.0 3.0


 


    





    





 6/15/17 6/15/17 6/15/17 6/15/17





 02:52 03:59 07:57 08:00


 


Temp 98.1  98.1 





 98.1  98.1 


 


Pulse 73  60 


 


Resp 16  16 


 


B/P (MAP) 156/84 (108)  111/57 (75) 


 


Pulse Ox 96 91 96 


 


O2 Delivery Nasal Cannula Nasal Cannula Nasal Cannula Nasal Cannula


 


O2 Flow Rate 3.0 3.0 3.0 3.0


 


    





    





 6/15/17 6/15/17  





 08:01 08:24  


 


Pulse  60  


 


B/P (MAP)  111/57  


 


Pulse Ox 92   


 


O2 Delivery Nasal Cannula   


 


O2 Flow Rate 3.0   














Intake and Output   


 


 6/14/17 6/14/17 6/15/17





 15:00 23:00 07:00


 


Intake Total   340 ml


 


Balance   340 ml

















SUMMER TABOR MD [place] 15, 2017 10:38

## 2017-06-15 NOTE — PDOC
PROGRESS NOTES


Assessment


Assessment


Metabolic encephalopathy.


Fall


UTI


AFib


COPD


Hypothyroidism


Lung cancer.


SOB





RECOMMENDATIONS/PLAN:


Treat medical diseases.


OT/PT





HCT no acute findings.





HISTORY OF THE PRESENT ILLNESS:


84-y-old female patient had a fall likely due to found on the floor from bed. 

She had mental status changes and was unable to recall the events. She was 

brought to the ER of [facility] for furtehr evaluation. She did not have focalized 

sensory or motor deficits noted.





PAST SURGICAL HISTORY:  


Lung cancer received radiation therapy.





PAST SURGERY HISTORY: 


Appendectomy, gallbladder surgery, , hysterectomy, tonsillectomy with


tympanoplasty, multiple compression fractures, back surgery x2, and hernia


repair x3.





ALLERGIES: 


ADHESIVES, ASPIRIN, AND MORPHINE.





PERSONAL HISTORY:  


She smoked for many years and quit recently, one pack at


least.  Denies alcohol or street drugs.





MEDICATIONS:


Refer to MAR





FAMILY HISTORY: 


Non contributory.





REVIEW OF SYSTEMS:


Constitutional: No malnutrition, weight loss, cachexia.


Head: No traumatic brain or head injury.


Skin: No edema, or rash.


Ear: No infection.


Eyes: No vision loss or color blindness.


Nose: No bleeding or purulent discharges.


Hearing: Hearing decrease.


Neck: No injury.


Breast: No history of cancer, masses,or discharges.


Cardiac: No MI, arrhythmia.


Pulmonary: COPD, lung cancer.


GI: No GI ulcer, GI bleeding.


Urinary/genital: UTI.


Endocrinologic: No cousin face, craniofacial dysmorphism, polydactyly.


Skeletomuscular: No muscular atrophy, deformity.


Neurological: see  HP.


Psychiatric: Denies drug use/abuse.


Otherwise, not pertinant14-point review of systems.





PHYSICAL EXAMINATION:   


General appearance is in no acute distress.  


HEENT:  Normocephalic and nontraumatic.  Eyes, nose, ears, and throat are 

unremarkable.  


Neck is supple. No lymphadenopathy. No crepitus. 


Cardiovascular:  S1, S2, irregular rate and rhythm. SM heart murmur 4/6.


Pulmonary:  Breathing sounds decreased to auscultation bilaterally. 


Abdomen:  Bowel sounds are positive.  


Extremities:  No rash, lesions, or edema.  


No restriction of range of motion





NEUROLOGICAL  EXAMINATION:


Awake.


Able to understand and follow some commands.


Oriented to place and person but not to time.


PERRL.


EOMI.


CN: no focal findings.


Muscle tone: within normal.


Muscle strength: 4+


DTR: 2-


Plantar reflex: Neutral response bilaterally 


Gait: not examined in bed. 


Sensory exam: no abnormal findings.


No acute cerebellar signs elicited.


F-T-N test fine.





Objective


Objective





Vital Signs








  Date Time  Temp Pulse Resp B/P (MAP) Pulse Ox O2 Delivery O2 Flow Rate FiO2


 


6/15/17 11:52      Nasal Cannula 3.0 


 


6/15/17 10:50 98.8 77 18 110/59 (76) 97   





 98.8       














Intake and Output 


 


 6/15/17





 06:59


 


Intake Total 340 ml


 


Balance 340 ml


 


 


 


Intake Oral 340 ml


 


# Voids 2











Vitals Signs


Vitals





 VS - Last 72 Hours, by Label








  Date Time  Temp Pulse Resp B/P (MAP) Pulse Ox O2 Delivery O2 Flow Rate FiO2


 


6/15/17 11:52      Nasal Cannula 3.0 


 


6/15/17 10:50 98.8 77 18 110/59 (76) 97 Nasal Cannula 3.0 





 98.8       


 


6/15/17 08:24  60  111/57    


 


6/15/17 08:01     92 Nasal Cannula 3.0 


 


6/15/17 08:00      Nasal Cannula 3.0 


 


6/15/17 07:57 98.1 60 16 111/57 (75) 96 Nasal Cannula 3.0 





 98.1       


 


6/15/17 03:59     91 Nasal Cannula 3.0 


 


6/15/17 02:52 98.1 73 16 156/84 (108) 96 Nasal Cannula 3.0 





 98.1       


 


6/15/17 00:16     92 Nasal Cannula 3.0 


 


17 23:09 98.1 79 16 108/56 (73) 94 Nasal Cannula 3.0 





 98.1       


 


17 20:00      Nasal Cannula 3.0 


 


17 19:14     91 Nasal Cannula 3.0 


 


17 19:12 99.1 120 16 103/61 (75) 93 Nasal Cannula 2.0 





 99.1       


 


17 16:32     93 BiPAP/CPAP  


 


17 14:00 99.1 91 20 83/54 (64) 90 BiPAP/CPAP  





 99.1       


 


17 12:57     95 Nasal Cannula 2.0 


 


17 12:13     95 BiPAP/CPAP  


 


17 11:15 100.2 60 24 113/44 (67) 80 BiPAP/CPAP  





 100.2       


 


17 11:02      Nasal Cannula 2.0 


 


17 10:40     85 Nasal Cannula 2.0 


 


17 08:49  87  109/45    


 


17 08:00      Nasal Cannula 2.0 


 


17 07:59 98.4 87 16 109/45 (66) 85 Nasal Cannula 2.0 





 98.4       











Laboratory


Laboratory





Microbiology


17 Urine Culture - Final, Complete


          


17 Urine Culture Result 1 (RENAE) - Final, Complete





Comment


Review of Relevant


I have reviewed the following items shan (where applicable) has been applied.











DONNA GALVAN MD [place] 15, 2017 13:38

## 2017-06-16 VITALS — SYSTOLIC BLOOD PRESSURE: 140 MMHG | DIASTOLIC BLOOD PRESSURE: 58 MMHG

## 2017-06-16 VITALS — SYSTOLIC BLOOD PRESSURE: 132 MMHG | DIASTOLIC BLOOD PRESSURE: 70 MMHG

## 2017-06-16 VITALS — DIASTOLIC BLOOD PRESSURE: 55 MMHG | SYSTOLIC BLOOD PRESSURE: 140 MMHG

## 2017-06-16 LAB
MAGNESIUM SERPL-MCNC: 2.4 MG/DL (ref 1.8–2.4)
POTASSIUM SERPL-SCNC: 4.7 MMOL/L (ref 3.5–5.1)

## 2017-06-16 RX ADMIN — CLOPIDOGREL BISULFATE SCH MG: 75 TABLET ORAL at 07:56

## 2017-06-16 RX ADMIN — IPRATROPIUM BROMIDE AND ALBUTEROL SULFATE SCH ML: .5; 3 SOLUTION RESPIRATORY (INHALATION) at 00:07

## 2017-06-16 RX ADMIN — IPRATROPIUM BROMIDE AND ALBUTEROL SULFATE SCH ML: .5; 3 SOLUTION RESPIRATORY (INHALATION) at 04:13

## 2017-06-16 RX ADMIN — VITAMIN D, TAB 1000IU (100/BT) SCH UNIT: 25 TAB at 07:56

## 2017-06-16 RX ADMIN — LEVOTHYROXINE SODIUM SCH MCG: 88 TABLET ORAL at 06:00

## 2017-06-16 RX ADMIN — DOCUSATE SODIUM SCH MG: 100 CAPSULE, LIQUID FILLED ORAL at 07:56

## 2017-06-16 RX ADMIN — ESCITALOPRAM OXALATE SCH MG: 10 TABLET ORAL at 07:56

## 2017-06-16 RX ADMIN — IPRATROPIUM BROMIDE AND ALBUTEROL SULFATE SCH ML: .5; 3 SOLUTION RESPIRATORY (INHALATION) at 11:33

## 2017-06-16 RX ADMIN — SENNOSIDES A AND B SCH MG: 8.6 TABLET, FILM COATED ORAL at 07:56

## 2017-06-16 RX ADMIN — DIGOXIN SCH MCG: 125 TABLET ORAL at 07:56

## 2017-06-16 RX ADMIN — IPRATROPIUM BROMIDE AND ALBUTEROL SULFATE SCH ML: .5; 3 SOLUTION RESPIRATORY (INHALATION) at 07:38

## 2017-06-16 RX ADMIN — CYANOCOBALAMIN TAB 1000 MCG SCH MCG: 1000 TAB at 07:56

## 2017-06-16 NOTE — PDOC
PROGRESS NOTES


Subjective


Subjective


She feels better.





Objective


Objective





Vital Signs








  Date Time  Temp Pulse Resp B/P (MAP) Pulse Ox O2 Delivery O2 Flow Rate FiO2


 


6/16/17 08:00      Nasal Cannula 4.0 


 


6/16/17 07:56  70  140/55    


 


6/16/17 07:00 97.6  18  91   





 97.6       














Intake and Output 


 


 6/16/17





 07:00


 


Intake Total 450 ml


 


Output Total 2450 ml


 


Balance -2000 ml


 


 


 


Intake Oral 450 ml


 


Output Urine Total 2450 ml











Physical Exam


Physical Exam


She continues with good muscle strength in all 4 extremities.She is supine in 

bed and receiving oxygen by nasal canula and she continues with SOB on exertion 

and that is limiting her activity.





Assessment


Assessment


Problems


Medical Problems:


(1) Altered mental status


Status: Acute  





(2) Fall


Status: Acute  





(3) UTI (urinary tract infection)


Status: Acute  











Plan


Plan of Care


To SNF when her pulmonary status improves.





Comment


Review of Relevant


I have reviewed the following items shan (where applicable) has been applied.


Labs





Laboratory Tests








Test


  6/14/17


12:23 6/16/17


03:19


 


O2 Saturation 79 % (92-99)  


 


Arterial Blood pH


  7.33


(7.35-7.45) 


 


 


Arterial Blood pCO2 at


Patient Temp 68 mmHg


(35-46) 


 


 


Arterial Blood pO2 at Patient


Temp 49 mmHg


() 


 


 


Arterial Blood HCO3


  35 mmol/L


(21-28) 


 


 


Arterial Blood Base Excess


  7 mmol/L


(-3-3) 


 


 


Potassium Level


  


  4.7 mmol/L


(3.5-5.1)


 


Magnesium Level


  


  2.4 mg/dL


(1.8-2.4)








Laboratory Tests








Test


  6/16/17


03:19


 


Potassium Level


  4.7 mmol/L


(3.5-5.1)


 


Magnesium Level


  2.4 mg/dL


(1.8-2.4)








Microbiology


6/12/17 Urine Culture - Final, Complete


          


6/12/17 Urine Culture Result 1 (RENAE) - Final, Complete


Medications





Current Medications


Ceftriaxone Sodium 50 ml @  100 mls/hr 1X  ONCE IV  Last administered on 6/12/ 17at 08:30;  Start 6/12/17 at 08:30;  Stop 6/12/17 at 08:59;  Status DC


Sodium Chloride 1,000 ml @  75 mls/hr [id number] IV  Last administered on 6/12/ 17at 09:10;  Start 6/12/17 at 09:10;  Stop 6/12/17 at 23:01;  Status DC


Ceftriaxone Sodium 1 gm/ Sodium Chloride 50 ml @  100 mls/hr Q24H IV  Last 

administered on 6/13/17at 10:14;  Start 6/13/17 at 11:00;  Stop 6/14/17 at 10:19

;  Status DC


Dextrose/Sodium Chloride 1,000 ml @  75 mls/hr [id number] IV  Last administered on 

6/14/17at 01:24;  Start 6/12/17 at 10:45;  Stop 6/14/17 at 10:19;  Status DC


Albuterol Sulfate (Ventolin Neb Soln) 2.5 mg PRN Q2HR  PRN NEB DYSPNEA;  Start 6 /12/17 at 17:45


Albuterol/ Ipratropium (Duoneb) 3 ml Q4HRS NEB  Last administered on 6/16/17at 

07:38;  Start 6/12/17 at 20:00


Alprazolam (Xanax) 0.25 mg PRN TID  PRN PO ANXIETY / AGITATION;  Start 6/12/17 

at 18:45


Cyanocobalamin (Vitamin B-12) 1,000 mcg DAILY PO  Last administered on 6/16/ 17at 07:56;  Start 6/13/17 at 09:00


Digoxin (Lanoxin) 125 mcg DAILY PO  Last administered on 6/16/17at 07:56;  

Start 6/13/17 at 09:00


Docusate Sodium (Colace) 100 mg DAILY PO  Last administered on 6/16/17at 07:56;

  Start 6/13/17 at 09:00


Escitalopram Oxalate (Lexapro) 20 mg DAILY PO  Last administered on 6/16/17at 07

:56;  Start 6/13/17 at 09:00


Albuterol/ Ipratropium (Duoneb) 3 ml QID NEB ;  Start 6/12/17 at 21:00;  Status 

UNV


Levothyroxine Sodium (Synthroid) 88 mcg DAILY07 PO  Last administered on 6/16/ 17at 06:00;  Start 6/13/17 at 07:00


Ondansetron HCl (Zofran Odt) 4 mg Q8HRS PO  Last administered on 6/13/17at 06:04

;  Start 6/12/17 at 22:00;  Stop 6/13/17 at 07:20;  Status DC


Oxycodone/ Acetaminophen (Percocet 10/325) 1 tab PRN TID  PRN PO SEVERE PAIN 

Last administered on 6/14/17at 10:40;  Start 6/12/17 at 18:45


Sennosides (Senna) 8.6 mg DAILY PO  Last administered on 6/16/17at 07:56;  

Start 6/13/17 at 09:00


Trazodone HCl (Desyrel) 100 mg QHS PO  Last administered on 6/15/17at 20:49;  

Start 6/12/17 at 21:00


Vitamin D (Vitamin D3) 1,000 unit DAILY PO  Last administered on 6/16/17at 07:56

;  Start 6/13/17 at 09:00


Non-Formulary Medication 4,200 mg DAILY PO ;  Start 6/13/17 at 09:00;  Status 

UNV


Albuterol/ Ipratropium (Duoneb) 3 ml PRN Q4HRS  PRN NEB SHORTNESS OF BREATH;  

Start 6/12/17 at 21:00;  Status UNV


Ondansetron HCl (Zofran Odt) 4 mg PRN Q8HRS  PRN PO NAUSEA;  Start 6/14/17 at 06

:00


Clopidogrel Bisulfate (Plavix) 75 mg DAILYWBKFT PO  Last administered on 6/16/ 17at 07:56;  Start 6/14/17 at 08:00


Clopidogrel Bisulfate (Plavix) 75 mg 1X  ONCE PO  Last administered on 6/13/ 17at 10:31;  Start 6/13/17 at 10:15;  Stop 6/13/17 at 10:16;  Status DC


Atorvastatin Calcium (Lipitor) 10 mg QHS PO  Last administered on 6/15/17at 20:

49;  Start 6/13/17 at 21:00


Gadobutrol (Gadavist) 6 mmol 1X  ONCE IV  Last administered on 6/13/17at 15:34;

  Start 6/13/17 at 15:30;  Stop 6/13/17 at 15:31;  Status DC


Prednisone (Prednisone) 10 mg DAILY PO  Last administered on 6/16/17at 07:56;  

Start 6/14/17 at 12:00


Acetaminophen (Tylenol) 500 mg PRN Q6HRS  PRN PO MILD PAIN / TEMP Last 

administered on 6/15/17at 20:49;  Start 6/14/17 at 11:30


Oxycodone/ Acetaminophen (Percocet 5/325) 1 tab PRN TID  PRN PO MODERATE PAIN;  

Start 6/14/17 at 11:30


Magnesium Sulfate/ Dextrose 100 ml @  100 mls/hr 1X  ONCE IV  Last administered 

on 6/15/17at 18:04;  Start 6/15/17 at 18:00;  Stop 6/15/17 at 18:59;  Status DC





Active Scripts


Active


Zofran Odt (Ondansetron) 4 Mg Tab.rapdis 1 Tab SL Q8HRS


Vitamin B-12 (Cyanocobalamin (Vitamin B-12)) 1,000 Mcg Tablet 1 Tab PO DAILY


Senna (Sennosides) 8.6 Mg Tablet 8.6 Mg PO DAILY


Docusate Sodium 100 Mg Capsule 1 Cap PO DAILY


Percocet  Mg Tablet (Oxycodone/Acetaminophen) 1 Each Tablet 1 Tab PO TID 

PRN


Reported


Duoneb 0.5-3(2.5) Mg/3 Ml (Albuterol/Ipratropium) 3 Ml Ampul.neb 3 Ml NEB QID


Trazodone Hcl 100 Mg Tablet 1 Tab PO QHS


Vitamin D (Cholecalciferol (Vitamin D3)) 1,000 Unit Capsule 1 Cap PO DAILY


Cranberry (Cranberry Fruit) 400 Mg Tablet 4,200 Mg PO DAILY


Levothyroxine Sodium 88 Mcg Tablet 1 Tab PO DAILY


Alprazolam 0.25 Mg Tablet 1 Tab PO PRN TID PRN


Digoxin 125 Mcg Tablet 1 Tab PO DAILY


Escitalopram Oxalate 10 Mg Tablet 20 Mg PO DAILY


Vitals/I & O





Vital Sign - Last 24 Hours








 6/15/17 6/15/17 6/15/17 6/15/17





 10:50 11:52 14:21 16:01


 


Temp 98.8  98.1 





 98.8  98.1 


 


Pulse 77  90 


 


Resp 18  18 


 


B/P (MAP) 110/59 (76)  125/47 (73) 


 


Pulse Ox 97  97 


 


O2 Delivery Nasal Cannula Nasal Cannula Nasal Cannula Nasal Cannula


 


O2 Flow Rate 3.0 3.0 3.0 3.0


 


    





    





 6/15/17 6/15/17 6/15/17 6/15/17





 19:04 19:10 19:29 20:00


 


Temp  98.4  





  98.4  


 


Pulse  121  


 


Resp  22  


 


B/P (MAP)  126/74 (91)  


 


Pulse Ox 94 93 93 


 


O2 Delivery Nasal Cannula Nasal Cannula BiPAP/CPAP Nasal Cannula


 


O2 Flow Rate 4.5 3.0  4.5


 


    





    





 6/15/17 6/16/17 6/16/17 6/16/17





 23:30 00:07 03:34 04:13


 


Temp 98.4  97.9 





 98.4  97.9 


 


Pulse 82  74 


 


Resp 18  18 


 


B/P (MAP) 147/63 (91)  132/70 (90) 


 


Pulse Ox 95 93 98 


 


O2 Delivery Nasal Cannula BiPAP/CPAP Nasal Cannula Nasal Cannula


 


O2 Flow Rate 3.0  3.0 4.5


 


    





    





 6/16/17 6/16/17 6/16/17 6/16/17





 07:00 07:38 07:56 08:00


 


Temp 97.6   





 97.6   


 


Pulse 70  70 


 


Resp 18   


 


B/P (MAP) 140/58 (85)  140/55 


 


Pulse Ox 91   


 


O2 Delivery Nasal Cannula Nasal Cannula  Nasal Cannula


 


O2 Flow Rate 3.0 3.0  4.0














Intake and Output   


 


 6/15/17 6/15/17 6/16/17





 15:00 23:00 07:00


 


Intake Total  450 ml 


 


Output Total 700 ml 650 ml 1100 ml


 


Balance -700 ml -200 ml -1100 ml

















ROBSON CARREON MD Jun 16, 2017 09:34

## 2017-06-16 NOTE — PDOC
PROGRESS NOTES


Subjective


Subjective


 feels stronger  today





Objective


Objective





Vital Signs








  Date Time  Temp Pulse Resp B/P (MAP) Pulse Ox O2 Delivery O2 Flow Rate FiO2


 


6/16/17 08:00      Nasal Cannula 4.0 


 


6/16/17 07:56  70  140/55    


 


6/16/17 07:00 97.6  18  91   





 97.6       














Intake and Output 


 


 6/16/17





 07:00


 


Intake Total 450 ml


 


Output Total 2450 ml


 


Balance -2000 ml


 


 


 


Intake Oral 450 ml


 


Output Urine Total 2450 ml











Physical Exam


Abdomen:  Normal bowel sounds, Soft


Heart:  Regular rate, Normal S1, Normal S2


General:  Alert


HEENT:  Atraumatic


Lungs:  Clear to auscultation


MUSCULOSKELETAL:  No swelling


Neck:  Supple


Neuro:  Normal speech


Psych/Mental Status:  Mental status NL


Skin:  No breakdown


COMMENT


rt side weakness improving





Diagnosis


Problem List


Problems


Medical Problems:


(1) Altered mental status


Status: Acute  





(2) Fall


Status: Acute  





(3) UTI (urinary tract infection)


Status: Acute  











Assessment


Assessment


Problems


Medical Problems:


(1) Altered mental status


Status: Acute  





(2) Fall


Status: Acute  





(3) UTI (urinary tract infection)


Status: Acute  





FINAL IMPRESSION:


1. Cerebrovascular accident, right-sided weakness.


2.  Fall at home from bed.


3.  Chronic obstructive pulmonary disease, end-stage, on 5 liters oxygen.


4.  Paroxysmal atrial fibrillation.


5.  Osteoporosis.


6.  Anemia.


7.  History of lung cancer, had a radiation treatment 5 years ago.


8.  Urinary tract infection.  We will send UA and culture.  Start on Rocephin.





PLAN: 


snu today.


Needs BIPAP at NH


cardiology consult for  a fib appreciated, not  a  candidate  for 

anticoagulation.


MRI brain showed + cva acute.


rehab consult appreciated


echo done today good LVF, no thrombus


carotid  Doppler -ve.


cannot  take asa ,will start on plavix.


lipitor  for chol.


Problems:  





Plan


Plan of Care


Problems


Medical Problems:


(1) Altered mental status


Status: Acute  





(2) Fall


Status: Acute  





(3) UTI (urinary tract infection)


Status: Acute  








Comment


Review of Relevant


I have reviewed the following items shan (where applicable) has been applied.


Labs





Laboratory Tests








Test


  6/16/17


03:19


 


Potassium Level


  4.7 mmol/L


(3.5-5.1)


 


Magnesium Level


  2.4 mg/dL


(1.8-2.4)








Microbiology


6/12/17 Urine Culture - Final, Complete


          


6/12/17 Urine Culture Result 1 (RENAE) - Final, Complete


Medications





Current Medications


Magnesium Sulfate/ Dextrose 100 ml @  100 mls/hr 1X  ONCE IV  Last administered 

on 6/15/17at 18:04;  Start 6/15/17 at 18:00;  Stop 6/15/17 at 18:59;  Status DC


Vitals/I & O





Vital Sign - Last 24 Hours








 6/15/17 6/15/17 6/15/17 6/15/17





 10:50 11:52 14:21 16:01


 


Temp 98.8  98.1 





 98.8  98.1 


 


Pulse 77  90 


 


Resp 18  18 


 


B/P (MAP) 110/59 (76)  125/47 (73) 


 


Pulse Ox 97  97 


 


O2 Delivery Nasal Cannula Nasal Cannula Nasal Cannula Nasal Cannula


 


O2 Flow Rate 3.0 3.0 3.0 3.0


 


    





    





 6/15/17 6/15/17 6/15/17 6/15/17





 19:04 19:10 19:29 20:00


 


Temp  98.4  





  98.4  


 


Pulse  121  


 


Resp  22  


 


B/P (MAP)  126/74 (91)  


 


Pulse Ox 94 93 93 


 


O2 Delivery Nasal Cannula Nasal Cannula BiPAP/CPAP Nasal Cannula


 


O2 Flow Rate 4.5 3.0  4.5


 


    





    





 6/15/17 6/16/17 6/16/17 6/16/17





 23:30 00:07 03:34 04:13


 


Temp 98.4  97.9 





 98.4  97.9 


 


Pulse 82  74 


 


Resp 18  18 


 


B/P (MAP) 147/63 (91)  132/70 (90) 


 


Pulse Ox 95 93 98 


 


O2 Delivery Nasal Cannula BiPAP/CPAP Nasal Cannula Nasal Cannula


 


O2 Flow Rate 3.0  3.0 4.5


 


    





    





 6/16/17 6/16/17 6/16/17 6/16/17





 07:00 07:38 07:56 08:00


 


Temp 97.6   





 97.6   


 


Pulse 70  70 


 


Resp 18   


 


B/P (MAP) 140/58 (85)  140/55 


 


Pulse Ox 91   


 


O2 Delivery Nasal Cannula Nasal Cannula  Nasal Cannula


 


O2 Flow Rate 3.0 3.0  4.0














Intake and Output   


 


 6/15/17 6/15/17 6/16/17





 15:00 23:00 07:00


 


Intake Total  450 ml 


 


Output Total 700 ml 650 ml 1100 ml


 


Balance -700 ml -200 ml -1100 ml

















KODURI,VINAYA K MD Jun 16, 2017 10:20

## 2017-06-19 NOTE — PDOC
Provider Note


Provider Note


Discharge summary dictated.


#[id number]











SUMMER TABOR MD Jun 19, 2017 10:09

## 2017-06-19 NOTE — DS
DATE OF DISCHARGE:  06/16/2017



REASON FOR ADMISSION TO THE HOSPITAL:  Right-sided weakness and acute

cerebrovascular accident.



CONSULTATIONS:

1.  Dr. Morillo.

2.  Dr. Paredes.

3.  Dr. Jo.

4.  Dr. Madrid.



PROCEDURES DONE:  CT head, MRI of the brain, echocardiogram, and carotid

Doppler.



HOSPITAL COURSE:  The patient is an 84-year-old female with a history of

end-stage COPD, on home oxygen, also history of lung cancer, radiation treatment

5 years ago and she was found on the floor at home, was brought to the hospital,

last seen on the previous night.  CT head, no bleed.  The patient has weakness

on the right side and CT of cervical spine was negative for fracture.  The

patient was seen by Neurology and MRI of the brain shows consistent with acute

CVA.  The patient has also had window for 6 hours.  No TPA was given.  Her

carotid Doppler was negative.  Echo normal LVF.  The patient has a history of

paroxysmal atrial fibrillation, was seen by Cardiology, Dr. Fatima. 

Echocardiogram  no thrombus and it was felt because of her poor functional 
status and

falls, not a candidate for anticoagulation.  THE PATIENT IS ALLERGIC TO ASPIRIN.

 She was placed on Plavix.  Her condition was improving.  Seen by Dr. Jo. 

Her weakness in the right arm improved, right leg still weak, was given a

walker.  Recommended to go to [facility] for rehab.  The

patient is also on chronic COPD.  She is on BiPAP intermittently and she is a

DNR.



FINAL DIAGNOSES:

1.  Acute cerebrovascular accident with right hemiparesis.

2.  End-stage chronic obstructive pulmonary disease, requiring BiPAP.

2.  Chronic hypoxia, on home oxygen 4-5 liters.

3.  History of lung cancer 5 years ago.

4.  Hypothyroidism.

5.  Osteoporosis.

6.  Recurrent falls.

7.  Code status, DNR.

8.  Intermittent atrial fibrillation, not a candidate for anticoagulation.



DISCHARGE MEDICATIONS:  See MRAD for discharge medications.

 



______________________________

SUMMER TABOR MD



DR:  RAGINI/aidan  JOB#:  [id number] / [id number]

DD:  06/19/2017 10:08  DT:  06/19/2017 21:31

CAMERON

## 2017-09-26 ENCOUNTER — HOSPITAL ENCOUNTER (EMERGENCY)
Dept: HOSPITAL 61 - ER | Age: 82
Discharge: HOME | End: 2017-09-26
Payer: MEDICARE

## 2017-09-26 VITALS — BODY MASS INDEX: 27.48 KG/M2 | HEIGHT: 56 IN | WEIGHT: 122.19 LBS

## 2017-09-26 VITALS — DIASTOLIC BLOOD PRESSURE: 53 MMHG | SYSTOLIC BLOOD PRESSURE: 124 MMHG

## 2017-09-26 DIAGNOSIS — J44.9: ICD-10-CM
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
## 2017-09-26 DIAGNOSIS — Z88.8: ICD-10-CM
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
## 2017-09-26 DIAGNOSIS — I48.91: ICD-10-CM
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
## 2017-09-26 DIAGNOSIS — Z88.6: ICD-10-CM

## 2017-09-26 DIAGNOSIS — Z85.118: ICD-10-CM

## 2017-09-26 DIAGNOSIS — S71.011A: Primary | ICD-10-CM

## 2017-09-26 DIAGNOSIS — Z88.5: ICD-10-CM

## 2017-09-26 DIAGNOSIS — E03.9: ICD-10-CM

## 2017-09-26 PROCEDURE — 90715 TDAP VACCINE 7 YRS/> IM: CPT

## 2017-09-26 PROCEDURE — 94250: CPT

## 2017-09-26 PROCEDURE — 90471 IMMUNIZATION ADMIN: CPT

## 2017-09-26 PROCEDURE — 94640 AIRWAY INHALATION TREATMENT: CPT

## 2017-09-26 PROCEDURE — 73502 X-RAY EXAM HIP UNI 2-3 VIEWS: CPT

## 2017-09-26 PROCEDURE — 99285 EMERGENCY DEPT VISIT HI MDM: CPT

## 2017-09-26 PROCEDURE — 94760 N-INVAS EAR/PLS OXIMETRY 1: CPT

## 2017-09-26 PROCEDURE — 70450 CT HEAD/BRAIN W/O DYE: CPT

## 2017-09-26 NOTE — RAD
Examination: 2 views of the right hip with frontal view of the pelvis



History: History of right hip pain, fall



Comparison: None available



Findings:



Osseous demineralization limits evaluation. The femoral heads are within the

acetabula.



Moderate joint space loss identified in the bilateral hip joints. No obvious

acute fracture visualized.





Impression:



1. No obvious acute fracture identified. Examination limited due to osseous

demineralization.



2. Moderate degenerative changes bilateral hip joints.

## 2017-09-26 NOTE — RAD
CT head without contrast    



History: Fall with pain.



Comparison: 6/12/2017.



Procedure: Axial images are obtained of the head from the skull base through

the vertex without IV contrast.



Findings: Moderate bilateral periventricular white matter hypodensities likely

chronic small vessel ischemic disease. Areas of encephalomalacia involving the

posterior temporoparietal lobes bilaterally and in the right frontal and right

cerebellar hemisphere grossly similar to prior exam.



  The ventricles and sulci are normal for the patient's age. No mass-effect,

intracranial mass, midline shift, hemorrhage or obvious acute infarction is

identified.  Basilar cisterns are patent.  Bone windows demonstrate no

significant calvarial abnormality. The visualized paranasal sinuses appear

clear.    



Impression: 



    1. No acute intracranial process.     



PQRS Compliance Statement:



One or more of the following individualized dose reduction techniques were

utilized for this examination:

1. Automated exposure control

2. Adjustment of the mA and/or kV according to patient size

3. Use of iterative reconstruction technique

## 2017-11-15 ENCOUNTER — HOSPITAL ENCOUNTER (EMERGENCY)
Dept: HOSPITAL 61 - ER | Age: 82
Discharge: HOME | End: 2017-11-15
Payer: MEDICARE

## 2017-11-15 VITALS — WEIGHT: 117 LBS | HEIGHT: 58 IN | BODY MASS INDEX: 24.56 KG/M2

## 2017-11-15 VITALS — DIASTOLIC BLOOD PRESSURE: 57 MMHG | SYSTOLIC BLOOD PRESSURE: 121 MMHG

## 2017-11-15 DIAGNOSIS — S70.01XA: ICD-10-CM

## 2017-11-15 DIAGNOSIS — G89.29: ICD-10-CM

## 2017-11-15 DIAGNOSIS — Z90.710: ICD-10-CM
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
## 2017-11-15 DIAGNOSIS — Z85.118: ICD-10-CM

## 2017-11-15 DIAGNOSIS — Z90.49: ICD-10-CM

## 2017-11-15 DIAGNOSIS — Z88.5: ICD-10-CM

## 2017-11-15 DIAGNOSIS — Z98.890: ICD-10-CM

## 2017-11-15 DIAGNOSIS — N39.0: ICD-10-CM

## 2017-11-15 DIAGNOSIS — S30.0XXA: Primary | ICD-10-CM

## 2017-11-15 DIAGNOSIS — Z88.8: ICD-10-CM
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
## 2017-11-15 DIAGNOSIS — E03.9: ICD-10-CM
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
## 2017-11-15 DIAGNOSIS — Z88.6: ICD-10-CM

## 2017-11-15 DIAGNOSIS — J43.9: ICD-10-CM

## 2017-11-15 DIAGNOSIS — Z86.73: ICD-10-CM

## 2017-11-15 DIAGNOSIS — I48.91: ICD-10-CM

## 2017-11-15 LAB
ALBUMIN SERPL-MCNC: 2.6 G/DL (ref 3.4–5)
ALBUMIN/GLOB SERPL: 0.6 {RATIO} (ref 1–1.7)
ALP SERPL-CCNC: 60 U/L (ref 46–116)
ALT SERPL-CCNC: 11 U/L (ref 14–59)
ANION GAP SERPL CALC-SCNC: -1 MMOL/L (ref 6–14)
ANISOCYTOSIS BLD QL SMEAR: SLIGHT
AST SERPL-CCNC: 18 U/L (ref 15–37)
BACTERIA #/AREA URNS HPF: (no result) /HPF
BASOPHILS # BLD AUTO: 0 X10^3/UL (ref 0–0.2)
BASOPHILS NFR BLD: 1 % (ref 0–3)
BILIRUB SERPL-MCNC: 0.5 MG/DL (ref 0.2–1)
BILIRUB UR QL STRIP: NEGATIVE
BUN SERPL-MCNC: 18 MG/DL (ref 7–20)
BUN/CREAT SERPL: 18 (ref 6–20)
CALCIUM SERPL-MCNC: 9.5 MG/DL (ref 8.5–10.1)
CHLORIDE SERPL-SCNC: 97 MMOL/L (ref 98–107)
CO2 SERPL-SCNC: 43 MMOL/L (ref 21–32)
CREAT SERPL-MCNC: 1 MG/DL (ref 0.6–1)
EOSINOPHIL NFR BLD: 1 % (ref 0–3)
ERYTHROCYTE [DISTWIDTH] IN BLOOD BY AUTOMATED COUNT: 17.3 % (ref 11.5–14.5)
GFR SERPLBLD BASED ON 1.73 SQ M-ARVRAT: 52.7 ML/MIN
GLOBULIN SER-MCNC: 4.1 G/DL (ref 2.2–3.8)
GLUCOSE SERPL-MCNC: 93 MG/DL (ref 70–99)
GLUCOSE UR STRIP-MCNC: NEGATIVE MG/DL
HCT VFR BLD CALC: 27.3 % (ref 36–47)
HGB BLD-MCNC: 9 G/DL (ref 12–15.5)
LYMPHOCYTES # BLD: 0.9 X10^3/UL (ref 1–4.8)
LYMPHOCYTES NFR BLD AUTO: 9 % (ref 24–48)
MCH RBC QN AUTO: 30 PG (ref 25–35)
MCHC RBC AUTO-ENTMCNC: 33 G/DL (ref 31–37)
MCV RBC AUTO: 92 FL (ref 79–100)
MONOCYTES NFR BLD: 9 % (ref 0–9)
NEUTROPHILS NFR BLD AUTO: 81 % (ref 31–73)
NITRITE UR QL STRIP: NEGATIVE
PH UR STRIP: 7 [PH]
PLATELET # BLD AUTO: 123 X10^3/UL (ref 140–400)
PLATELET # BLD EST: (no result) 10*3/UL
POTASSIUM SERPL-SCNC: 4.3 MMOL/L (ref 3.5–5.1)
PROT SERPL-MCNC: 6.7 G/DL (ref 6.4–8.2)
PROT UR STRIP-MCNC: 100 MG/DL
RBC # BLD AUTO: 2.97 X10^6/UL (ref 3.5–5.4)
RBC #/AREA URNS HPF: (no result) /HPF (ref 0–2)
SODIUM SERPL-SCNC: 139 MMOL/L (ref 136–145)
SP GR UR STRIP: 1.02
SQUAMOUS #/AREA URNS LPF: (no result) /LPF
UROBILINOGEN UR-MCNC: 1 MG/DL
WBC # BLD AUTO: 9.7 X10^3/UL (ref 4–11)
WBC #/AREA URNS HPF: (no result) /HPF (ref 0–4)

## 2017-11-15 PROCEDURE — 81001 URINALYSIS AUTO W/SCOPE: CPT

## 2017-11-15 PROCEDURE — 99285 EMERGENCY DEPT VISIT HI MDM: CPT

## 2017-11-15 PROCEDURE — 84484 ASSAY OF TROPONIN QUANT: CPT

## 2017-11-15 PROCEDURE — 93005 ELECTROCARDIOGRAM TRACING: CPT

## 2017-11-15 PROCEDURE — 87086 URINE CULTURE/COLONY COUNT: CPT

## 2017-11-15 PROCEDURE — 72125 CT NECK SPINE W/O DYE: CPT

## 2017-11-15 PROCEDURE — 36415 COLL VENOUS BLD VENIPUNCTURE: CPT

## 2017-11-15 PROCEDURE — 80053 COMPREHEN METABOLIC PANEL: CPT

## 2017-11-15 PROCEDURE — 72131 CT LUMBAR SPINE W/O DYE: CPT

## 2017-11-15 PROCEDURE — 73502 X-RAY EXAM HIP UNI 2-3 VIEWS: CPT

## 2017-11-15 PROCEDURE — 96365 THER/PROPH/DIAG IV INF INIT: CPT

## 2017-11-15 PROCEDURE — 71010: CPT

## 2017-11-15 PROCEDURE — 85025 COMPLETE CBC W/AUTO DIFF WBC: CPT

## 2017-11-15 PROCEDURE — 70450 CT HEAD/BRAIN W/O DYE: CPT

## 2017-11-15 NOTE — EKG
[facility] acute hospital

              [address] [place] [address]

Test Date:    2017-11-15               Test Time:    14:21:40

Pat Name:     YAN COLLINS             Department:   

Patient ID:   [id number]           Room:          

Gender:       F                        Technician:   

:          [id number]               Requested By: NORMA BANKS

Order Number: [id number]            Reading MD:   Joce Lemons

                                 Measurements

Intervals                              Axis          

Rate:         86                       P:            

MT:                                    QRS:          -21

QRSD:         74                       T:            8

QT:           334                                    

QTc:          402                                    

                           Interpretive Statements

SINUS RHYTHM

VENTRICULAR PREMATURE COMPLEX(ES)

LEFTWARD AXIS

LOW LIMB LEAD VOLTAGE

NONSPECIFIC ST-T WAVE CHANGES.

ABNORMAL ECG

RI6.01

Electronically Signed On 11- 16:12:21 CST by Joce Lemons

## 2017-11-15 NOTE — PHYS DOC
Past Medical History


Past Medical History:  A-Fib, Anxiety, Asthma, Bronchitis, Cancer, COPD, 

Depression, Hypothyroid, Pneumonia, Other


Additional Past Medical Histor:  chronic back, lung ca; hernia, heart dis, 

emphysema, Osteopor, home o2=5L


Past Surgical History:  Appendectomy, Cholecystectomy, , Hysterectomy, 

Tonsillectomy, Other


Additional Past Surgical Histo:  back X2, hernia x 3


Alcohol Use:  None


Drug Use:  None





Adult General


Chief Complaint


Chief Complaint:  MECHANICAL FALL





HPI


HPI





Patient is a 85  year old female who presents with fall from standing prior to 

arrival at home complaining of low back pain and right hip pain; unsure if loss 

of consciousness but she said she woke up on the floor. Denies diabetes or 

coronary artery disease. She has COPD and is on 4 L of O2 by nasal cannula at 

baseline. Denies any numbness tingling or weakness in the arms or legs and 

denies any pain to the joints of the upper or lower extremities except for 

questionable right hip discomfort although she is able to lift her right leg.





Review of Systems


Review of Systems





Constitutional: Denies fever or chills []


Eyes: Denies change in visual acuity, redness, or eye pain []


HENT: Denies nasal congestion or sore throat []


Respiratory: Denies cough or shortness of breath []


Cardiovascular: No additional information not addressed in HPI []


GI: Denies abdominal pain, nausea, vomiting, bloody stools or diarrhea []


: Denies dysuria or hematuria []


Musculoskeletal: Denies back pain or joint pain []


Integument: Denies rash or skin lesions []


Neurologic: Denies headache, focal weakness or sensory changes []


Endocrine: Denies polyuria or polydipsia []





All other systems were reviewed and found to be within normal limits, except as 

documented in this note.





Current Medications


Current Medications





Current Medications








 Medications


  (Trade)  Dose


 Ordered  Sig/Franky  Start Time


 Stop Time Status Last Admin


Dose Admin


 


 Ceftriaxone Sodium  50 ml @ 


 100 mls/hr  1X  ONCE  11/15/17 18:00


 11/15/17 18:29 DC 11/15/17 18:01


100 MLS/HR


 


 Oxycodone/


 Acetaminophen


  (Percocet 5/325)  1 tab  1X  ONCE  11/15/17 14:15


 11/15/17 14:18 DC 11/15/17 15:12


1 TAB











Allergies


Allergies





Allergies








Coded Allergies Type Severity Reaction Last Updated Verified


 


  morphine Allergy Intermediate PER DAUGHTER, DOES NOT AGREE WITH PT 14 Yes


 


  adhesive Adverse Reaction Intermediate blisters 4/22/15 Yes


 


  aspirin Adverse Reaction Intermediate Nausea and Vomiting, "makes me sick" 4/

22/15 Yes











Physical Exam


Physical Exam





Constitutional: Well developed, well nourished, no acute distress, non-toxic 

appearance. []


HENT: Normocephalic, atraumatic, bilateral external ears normal, oropharynx 

moist, no oral exudates, nose normal. []


Eyes: PERRLA, EOMI, conjunctiva normal, no discharge. [] 


Neck: Normal range of motion, no tenderness, supple, no stridor. Nontender to 

palpation normal range of motion for her age [] 


Cardiovascular:Heart rate regular rhythm, no murmur []


Lungs & Thorax:  Bilateral breath sounds clear to auscultation []


Abdomen: Bowel sounds normal, soft, no tenderness, no masses, no pulsatile 

masses. [] 


Skin: Warm, dry, no erythema, no rash. [] 


Back: tenderness lumbar spine area no deformities or step-offs, no CVA 

tenderness. [] 


Extremities: No tenderness, no cyanosis, no clubbing, ROM intact, no edema. No 

foreshortening or external rotation; able to lift the leg up does; complain of 

some pain to the right hip with passive range of motion.[] 


Neurologic: Alert and oriented X 3, normal motor function, normal sensory 

function, no focal deficits noted. []


Psychologic: Affect normal, judgement normal, mood normal. []





Current Patient Data


Vital Signs





 Vital Signs








  Date Time  Temp Pulse Resp B/P (MAP) Pulse Ox O2 Delivery O2 Flow Rate FiO2


 


11/15/17 17:03  86 20  96   


 


11/15/17 14:06 99.0   153/69 (97)  Nasal Cannula 4.0 





 99.0       








Lab Values





 Laboratory Tests








Test


  11/15/17


14:30 11/15/17


15:46 11/15/17


16:20


 


White Blood Count


  9.7 x10^3/uL


(4.0-11.0) 


  


 


 


Red Blood Count


  2.97 x10^6/uL


(3.50-5.40)  L 


  


 


 


Hemoglobin


  9.0 g/dL


(12.0-15.5)  L 


  


 


 


Hematocrit


  27.3 %


(36.0-47.0)  L 


  


 


 


Mean Corpuscular Volume


  92 fL ()


  


  


 


 


Mean Corpuscular Hemoglobin 30 pg (25-35)    


 


Mean Corpuscular Hemoglobin


Concent 33 g/dL


(31-37) 


  


 


 


Red Cell Distribution Width


  17.3 %


(11.5-14.5)  H 


  


 


 


Platelet Count


  123 x10^3/uL


(140-400)  L 


  


 


 


Neutrophils (%) (Auto) 81 % (31-73)  H  


 


Lymphocytes (%) (Auto) 9 % (24-48)  L  


 


Monocytes (%) (Auto) 9 % (0-9)    


 


Eosinophils (%) (Auto) 1 % (0-3)    


 


Basophils (%) (Auto) 1 % (0-3)    


 


Neutrophils # (Auto)


  7.9 x10^3uL


(1.8-7.7)  H 


  


 


 


Lymphocytes # (Auto)


  0.9 x10^3/uL


(1.0-4.8)  L 


  


 


 


Monocytes # (Auto)


  0.8 x10^3/uL


(0.0-1.1) 


  


 


 


Eosinophils # (Auto)


  0.1 x10^3/uL


(0.0-0.7) 


  


 


 


Basophils # (Auto)


  0.0 x10^3/uL


(0.0-0.2) 


  


 


 


Platelet Estimate  (ADEQUATE)    


 


Platelet Clumps, EDTA Present    


 


Anisocytosis Slight    


 


Troponin I Quantitative


  < 0.017 ng/mL


(0.000-0.055) 


  


 


 


Sodium Level


  


  139 mmol/L


(136-145) 


 


 


Potassium Level


  


  4.3 mmol/L


(3.5-5.1) 


 


 


Chloride Level


  


  97 mmol/L


()  L 


 


 


Carbon Dioxide Level


  


  43 mmol/L


(21-32)  H 


 


 


Anion Gap  -1 (6-14)  L 


 


Blood Urea Nitrogen


  


  18 mg/dL


(7-20) 


 


 


Creatinine


  


  1.0 mg/dL


(0.6-1.0) 


 


 


Estimated GFR


(Cockcroft-Gault) 


  52.7  


  


 


 


BUN/Creatinine Ratio  18 (6-20)   


 


Glucose Level


  


  93 mg/dL


(70-99) 


 


 


Calcium Level


  


  9.5 mg/dL


(8.5-10.1) 


 


 


Total Bilirubin


  


  0.5 mg/dL


(0.2-1.0) 


 


 


Aspartate Amino Transferase


(AST) 


  18 U/L (15-37)


  


 


 


Alanine Aminotransferase (ALT)


  


  11 U/L (14-59)


L 


 


 


Alkaline Phosphatase


  


  60 U/L


() 


 


 


Total Protein


  


  6.7 g/dL


(6.4-8.2) 


 


 


Albumin


  


  2.6 g/dL


(3.4-5.0)  L 


 


 


Albumin/Globulin Ratio


  


  0.6 (1.0-1.7)


L 


 


 


Urine Collection Type   Unknown  


 


Urine Color   Yellow  


 


Urine Clarity   Cloudy  


 


Urine pH   7.0  


 


Urine Specific Gravity   1.020  


 


Urine Protein


  


  


  100 mg/dL


(NEG-TRACE)


 


Urine Glucose (UA)


  


  


  Negative mg/dL


(NEG)


 


Urine Ketones (Stick)


  


  


  Negative mg/dL


(NEG)


 


Urine Blood


  


  


  Moderate (NEG)


 


 


Urine Nitrite


  


  


  Negative (NEG)


 


 


Urine Bilirubin


  


  


  Negative (NEG)


 


 


Urine Urobilinogen Dipstick


  


  


  1.0 mg/dL (0.2


mg/dL)


 


Urine Leukocyte Esterase   Large (NEG)  


 


Urine RBC


  


  


  3-5 /HPF (0-2)


 


 


Urine WBC


  


  


  20-40 /HPF


(0-4)


 


Urine Squamous Epithelial


Cells 


  


  Few /LPF  


 


 


Urine Bacteria


  


  


  Few /HPF


(0-FEW)


 


Urine Hyaline Casts   Few /HPF  


 


Urine Mucus   Slight /LPF  





 Laboratory Tests


11/15/17 14:30








 Laboratory Tests


11/15/17 15:46











EKG


EKG


EKG likely sinus rhythm rate of 86 no STEMI   poor data quality my 

interpretation[]





Radiology/Procedures


Radiology/Procedures


CT head C-spine L spine and head: Negative for acute fracture or bleed per 

radiology report


X-ray chest pelvis right hip[] negative for fracture per radiology; atelectasis 

questionable infiltrates in the bases per radiology





Course & Med Decision Making


Course & Med Decision Making


Pertinent Labs and Imaging studies reviewed. (See chart for details)





[Imaging studies were negative for any acute trauma or injury. Labs were 

unremarkable urinalysis was consistent with a mild UTI. Reexamination at 5:40 

PM patient feels improved she is requesting to go home. Patient was able to 

ambulate with some assistance in the emergency department she does get short of 

breath with exertion which is baseline.]





Plan to treating her tract infection patient is comfortable going home. 

Clinically does not appear to have pneumonia.





Dragon Disclaimer


Dragon Disclaimer


This electronic medical record was generated, in whole or in part, using a 

voice recognition dictation system.





Departure


Departure


Impression:  


 Primary Impression:  


 UTI (urinary tract infection)


 Additional Impressions:  


 Lumbar contusion


 Contusion of right hip


 Fall


Disposition:   HOME, SELF-CARE


Condition:  IMPROVED


Referrals:  


SUMMER TABOR MD (PCP)


Patient Instructions:  Contusion, Easy-to-Read, Urinary Tract Infection, Easy-to

-Read





Additional Instructions:  


may take Tylenol for pain as needed.


Scripts


Nitrofurantoin Monohyd/M-Cryst (MACROBID 100 MG CAPSULE) 100 Mg Capsule


1 CAP PO BID, #14 CAP


   Prov: NORMA BANKS MD         11/15/17





Problem Qualifiers











NORMA BANKS MD Nov 15, 2017 14:29

## 2017-11-15 NOTE — RAD
CT lumbar spine without contrast 11/15/2017



Clinical indication: Fall with low back pain.



Comparison: CT lumbar spine 10/11/2014



Technique: Multiple CT images of the lumbar spine were obtained without

contrast according to standard protocol.



PQRS Compliance Statement:



One or more of the following individualized dose reduction techniques were

utilized for this examination:

1. Automated exposure control

2. Adjustment of the mA and/or kV according to patient size

3. Use of iterative reconstruction technique



Findings:



There are 5 nonrib-bearing lumbar-type vertebral bodies. There is right

convexity lumbar scoliosis. There is diffuse bony demineralization. There are

moderate to severe lower thoracic compression deformities T8-T12 with prior

spinal augmentation at T9-T11. There is a stable moderate compression

deformity at L1 with prior methylmethacrylate administration. There are mild

central compression deformities at L3, L4 and L5 with no significant bony

retropulsion. No evidence of acute lumbar spine fracture or subluxation.



There is multilevel moderate lumbar disc degeneration with disc space

narrowing, endplate sclerosis, marginal osteophyte formation and vacuum disc

phenomenon. There is multilevel disc bulging and posterior disc ossify

complexes in combination with facet hypertrophy results in multilevel spinal

canal narrowing greatest to a moderate to severe degree at L3-L4. There is

multilevel neural foraminal narrowing greatest to marked severe degree on the

right at L4-L5 and L5-S1 and on the left at L2-L3, L4-L5 and L5-S1.



Abdominal aorta is normal in caliber with moderate calcified atheromatous

disease.



There is patchy consolidation and peribronchovascular opacities in the

visualized lung bases. Multiple renal cysts. Moderate hiatal hernia. 



Impression:

1. Diffuse bony demineralization which limits evaluation for subtle fractures.

2. There are multilevel chronic mild to severe compression deformities of the

lower thoracic and lumbar spine with prior spinal augmentation from T8-T11 and

L1.

3. No evidence of acute lumbar spine fracture or subluxation.

4. Advanced multilevel lumbar spondylosis resulting in multifocal spinal canal

narrowing, greatest to a moderate to severe degree, at L3-L4.

5. Multilevel neural foraminal stenosis, greatest to a moderate severe degree,

on the left at L2-L3 and bilaterally at L4-L5 and L5-S1.

6. Patchy consolidation in the visualized lung bases concerning for aspiration

or pneumonia. 

7. Moderate hiatal hernia.

## 2017-11-15 NOTE — RAD
Supine chest, 11/15/2017:



History: Fall, pain



Comparison is made to a study from 6/12/2017. The heart appears to be mildly

enlarged. There is calcific plaquing of the aorta. The pulmonary vascularity

is at the upper limits of normal. There appear to be scattered parenchymal

scars in the lungs. There are streaky basilar opacities compatible with mild

atelectasis/infiltrate. There is no evidence of pneumothorax or pleural fluid.



The bony structures are demineralized. Vertebroplasty changes are again noted

at multiple levels.



IMPRESSION:

1. Mild cardiomegaly and aortic atherosclerosis.

2. Mild streaky bibasilar atelectasis/infiltrate superimposed upon parenchymal

scarring.

## 2017-11-15 NOTE — RAD
Pelvis with right hip, 3 views, 11/15/2017:



History: Fall, pain



There is severe bony demineralization. No acute fracture or dislocation is

identified. The hip joints are well-maintained. Moderate degenerative change

is present in the lower lumbar spine.



IMPRESSION:

1. Demineralization.

2. No acute bony abnormality is detected.

## 2017-11-15 NOTE — RAD
CT head and cervical spine without contrast 11/15/2017



Clinical indication: Fall with head and neck pain.



Comparison: CT head and cervical spine 6/12/2017



Technique: Multiple CT images of the head and cervical spine were obtained

without contrast according to standard protocol.



PQRS Compliance Statement:



One or more of the following individualized dose reduction techniques were

utilized for this examination:

1. Automated exposure control

2. Adjustment of the mA and/or kV according to patient size

3. Use of iterative reconstruction technique



Findings:



Head:

There is mild prominence of the ventricles and subarachnoid spaces compatible

with mild generalized cerebral atrophy. There is stable bilateral

parietotemporal encephalomalacia, left greater than right. There is patchy and

confluent periventricular white matter low attenuation compatible with

moderate nonspecific white matter disease. No midline shift. Basal cisterns

are patent. There is an old lacunar infarct of the anterior right thalamus.



Cervical spine:

Examination is somewhat limited due to motion artifact.



Within these limitations, no evidence of acute cervical spine fracture.

Atlantoaxial articulation is maintained. Moderate atlantodens arthrosis. There

is multilevel cervical disc degeneration with disc space narrowing and

marginal osteophyte formation greatest to a mild degree from C4-C5 through

C6-C7. There is multilevel uncovertebral and facet hypertrophy with no

evidence of significant spinal canal or neural foraminal narrowing. There is a

0.6 cm hypodense nodule in the right lobe of the thyroid gland. Emphysema in

the visualized lung bases.



Impression:

Head:

1. No acute intracranial hemorrhage.

2. Stable encephalomalacia of the bilateral parietotemporal regions, likely

old infarct. 

3. 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

matter disease, likely related to chronic small vessel ischemic disease.



Cervical spine:

1. Somewhat limited due to motion with no CT evidence of acute cervical spine

fracture or subluxation.

2. Emphysema.

## 2017-12-11 ENCOUNTER — HOSPITAL ENCOUNTER (INPATIENT)
Dept: HOSPITAL 61 - ER | Age: 82
LOS: 4 days | Discharge: HOME | DRG: 189 | End: 2017-12-15
Attending: INTERNAL MEDICINE | Admitting: INTERNAL MEDICINE
Payer: MEDICARE

## 2017-12-11 VITALS — BODY MASS INDEX: 27.18 KG/M2 | WEIGHT: 126 LBS | HEIGHT: 57 IN

## 2017-12-11 VITALS — SYSTOLIC BLOOD PRESSURE: 110 MMHG | DIASTOLIC BLOOD PRESSURE: 62 MMHG

## 2017-12-11 VITALS — SYSTOLIC BLOOD PRESSURE: 99 MMHG | DIASTOLIC BLOOD PRESSURE: 51 MMHG

## 2017-12-11 DIAGNOSIS — I70.0: ICD-10-CM

## 2017-12-11 DIAGNOSIS — M48.50XA: ICD-10-CM

## 2017-12-11 DIAGNOSIS — F32.9: ICD-10-CM

## 2017-12-11 DIAGNOSIS — G89.4: ICD-10-CM

## 2017-12-11 DIAGNOSIS — Z66: ICD-10-CM

## 2017-12-11 DIAGNOSIS — Z99.81: ICD-10-CM

## 2017-12-11 DIAGNOSIS — Z90.710: ICD-10-CM

## 2017-12-11 DIAGNOSIS — I48.2: ICD-10-CM

## 2017-12-11 DIAGNOSIS — J96.21: Primary | ICD-10-CM

## 2017-12-11 DIAGNOSIS — G92: ICD-10-CM

## 2017-12-11 DIAGNOSIS — E87.2: ICD-10-CM

## 2017-12-11 DIAGNOSIS — J96.22: ICD-10-CM

## 2017-12-11 DIAGNOSIS — K44.9: ICD-10-CM

## 2017-12-11 DIAGNOSIS — J44.1: ICD-10-CM

## 2017-12-11 DIAGNOSIS — J98.11: ICD-10-CM

## 2017-12-11 DIAGNOSIS — Z87.891: ICD-10-CM

## 2017-12-11 DIAGNOSIS — D64.9: ICD-10-CM

## 2017-12-11 DIAGNOSIS — F41.9: ICD-10-CM

## 2017-12-11 DIAGNOSIS — Z85.118: ICD-10-CM

## 2017-12-11 DIAGNOSIS — I51.7: ICD-10-CM

## 2017-12-11 DIAGNOSIS — Z92.21: ICD-10-CM

## 2017-12-11 DIAGNOSIS — D69.6: ICD-10-CM

## 2017-12-11 DIAGNOSIS — Z88.8: ICD-10-CM

## 2017-12-11 DIAGNOSIS — Z90.49: ICD-10-CM

## 2017-12-11 DIAGNOSIS — Z87.01: ICD-10-CM

## 2017-12-11 DIAGNOSIS — E03.9: ICD-10-CM

## 2017-12-11 LAB
ALBUMIN SERPL-MCNC: 2.7 G/DL (ref 3.4–5)
ALBUMIN/GLOB SERPL: 0.6 {RATIO} (ref 1–1.7)
ALP SERPL-CCNC: 83 U/L (ref 46–116)
ALT SERPL-CCNC: 12 U/L (ref 14–59)
ANION GAP SERPL CALC-SCNC: -8 MMOL/L (ref 6–14)
AST SERPL-CCNC: 17 U/L (ref 15–37)
BASOPHILS # BLD AUTO: 0 X10^3/UL (ref 0–0.2)
BASOPHILS NFR BLD: 1 % (ref 0–3)
BILIRUB SERPL-MCNC: 0.3 MG/DL (ref 0.2–1)
BUN SERPL-MCNC: 15 MG/DL (ref 7–20)
BUN/CREAT SERPL: 15 (ref 6–20)
CALCIUM SERPL-MCNC: 8.8 MG/DL (ref 8.5–10.1)
CHLORIDE SERPL-SCNC: 99 MMOL/L (ref 98–107)
CO2 SERPL-SCNC: 52 MMOL/L (ref 21–32)
CREAT SERPL-MCNC: 1 MG/DL (ref 0.6–1)
EOSINOPHIL NFR BLD: 2 % (ref 0–3)
ERYTHROCYTE [DISTWIDTH] IN BLOOD BY AUTOMATED COUNT: 15.6 % (ref 11.5–14.5)
GFR SERPLBLD BASED ON 1.73 SQ M-ARVRAT: 52.7 ML/MIN
GLOBULIN SER-MCNC: 4.2 G/DL (ref 2.2–3.8)
GLUCOSE SERPL-MCNC: 93 MG/DL (ref 70–99)
HCO3 BLDA-SCNC: 50 MMOL/L (ref 21–28)
HCO3 BLDA-SCNC: 54 MMOL/L (ref 21–28)
HCT VFR BLD CALC: 28.2 % (ref 36–47)
HGB BLD-MCNC: 8.6 G/DL (ref 12–15.5)
INSPIRATION SETTING TIME VENT: 100
INSPIRATION SETTING TIME VENT: 40
LYMPHOCYTES # BLD: 1.1 X10^3/UL (ref 1–4.8)
LYMPHOCYTES NFR BLD AUTO: 27 % (ref 24–48)
MCH RBC QN AUTO: 30 PG (ref 25–35)
MCHC RBC AUTO-ENTMCNC: 30 G/DL (ref 31–37)
MCV RBC AUTO: 97 FL (ref 79–100)
MONOCYTES NFR BLD: 17 % (ref 0–9)
NEUTROPHILS NFR BLD AUTO: 54 % (ref 31–73)
PCO2 BLDA: 101 MMHG (ref 35–46)
PCO2 BLDA: 90 MMHG (ref 35–46)
PH ABG: 7.34 (ref 7.35–7.45)
PH ABG: 7.36 (ref 7.35–7.45)
PLATELET # BLD AUTO: 131 X10^3/UL (ref 140–400)
PO2 BLDA: 64 MMHG (ref 65–108)
PO2 BLDA: 71 MMHG (ref 65–108)
POTASSIUM SERPL-SCNC: 4.1 MMOL/L (ref 3.5–5.1)
PROT SERPL-MCNC: 6.9 G/DL (ref 6.4–8.2)
RBC # BLD AUTO: 2.9 X10^6/UL (ref 3.5–5.4)
SAO2 % BLDA: 91 % (ref 92–99)
SAO2 % BLDA: 93 % (ref 92–99)
SODIUM SERPL-SCNC: 143 MMOL/L (ref 136–145)
WBC # BLD AUTO: 4.1 X10^3/UL (ref 4–11)

## 2017-12-11 PROCEDURE — 96368 THER/DIAG CONCURRENT INF: CPT

## 2017-12-11 PROCEDURE — 80048 BASIC METABOLIC PNL TOTAL CA: CPT

## 2017-12-11 PROCEDURE — 85025 COMPLETE CBC W/AUTO DIFF WBC: CPT

## 2017-12-11 PROCEDURE — 83605 ASSAY OF LACTIC ACID: CPT

## 2017-12-11 PROCEDURE — 84484 ASSAY OF TROPONIN QUANT: CPT

## 2017-12-11 PROCEDURE — 96375 TX/PRO/DX INJ NEW DRUG ADDON: CPT

## 2017-12-11 PROCEDURE — 96366 THER/PROPH/DIAG IV INF ADDON: CPT

## 2017-12-11 PROCEDURE — 82805 BLOOD GASES W/O2 SATURATION: CPT

## 2017-12-11 PROCEDURE — 93005 ELECTROCARDIOGRAM TRACING: CPT

## 2017-12-11 PROCEDURE — 87040 BLOOD CULTURE FOR BACTERIA: CPT

## 2017-12-11 PROCEDURE — 94640 AIRWAY INHALATION TREATMENT: CPT

## 2017-12-11 PROCEDURE — 83690 ASSAY OF LIPASE: CPT

## 2017-12-11 PROCEDURE — 80053 COMPREHEN METABOLIC PANEL: CPT

## 2017-12-11 PROCEDURE — 36600 WITHDRAWAL OF ARTERIAL BLOOD: CPT

## 2017-12-11 PROCEDURE — 71260 CT THORAX DX C+: CPT

## 2017-12-11 PROCEDURE — 96365 THER/PROPH/DIAG IV INF INIT: CPT

## 2017-12-11 PROCEDURE — 36415 COLL VENOUS BLD VENIPUNCTURE: CPT

## 2017-12-11 PROCEDURE — 94660 CPAP INITIATION&MGMT: CPT

## 2017-12-11 PROCEDURE — 85007 BL SMEAR W/DIFF WBC COUNT: CPT

## 2017-12-11 PROCEDURE — 5A09357 ASSISTANCE WITH RESPIRATORY VENTILATION, LESS THAN 24 CONSECUTIVE HOURS, CONTINUOUS POSITIVE AIRWAY PRESSURE: ICD-10-PCS | Performed by: INTERNAL MEDICINE

## 2017-12-11 PROCEDURE — 71010: CPT

## 2017-12-11 RX ADMIN — ONDANSETRON SCH MG: 4 TABLET, ORALLY DISINTEGRATING ORAL at 22:00

## 2017-12-11 RX ADMIN — METHYLPREDNISOLONE SODIUM SUCCINATE SCH MG: 125 INJECTION, POWDER, FOR SOLUTION INTRAMUSCULAR; INTRAVENOUS at 22:37

## 2017-12-11 RX ADMIN — TRAZODONE HYDROCHLORIDE SCH MG: 100 TABLET ORAL at 21:25

## 2017-12-11 NOTE — RAD
Indication: Fall, chest pain.



Technique: Axial images and coronal and sagittal reformatted images are

provided. Comparison is from September 2, 2016.



One or more of the following individualized dose reduction techniques were

utilized for this examination:

1. Automated exposure control

2. Adjustment of the mA and/or kV according to patient size

3. Use of iterative reconstruction technique



Findings: There is no pneumothorax. There is minimal pleural effusion on the

right. There is atelectasis in the lung bases. There is severe emphysema.

There is no worrisome pulmonary nodule. Central airways are patent. There is

atheromatous disease in the thoracic aorta. There are coronary artery

calcifications. The heart is enlarged. Hiatal hernia is noted. Probable renal

cysts are noted in the right kidney. There are degenerative changes in the

spine. There are multiple thoracic vertebroplasties.



There is elevation of the right hemidiaphragm.



Impression:

1. No acute thoracic findings.

2. Severe emphysema.

3. Trace right pleural effusion.

4. Cardiomegaly.

5. Coronary artery calcifications

6. Hiatal hernia.

## 2017-12-11 NOTE — EKG
[facility]

              [address] [place] [address]

Test Date:    2017               Test Time:    12:54:42

Pat Name:     YAN COLLINS             Department:   

Patient ID:   [id number]           Room:          

Gender:       F                        Technician:   

:          [id number]               Requested By: ZITA MCMILLAN

Order Number: [id number]            Reading MD:   Carrington Alvarado MD

                                 Measurements

Intervals                              Axis          

Rate:         73                       P:            

VT:                                    QRS:          -26

QRSD:         76                       T:            21

QT:           344                                    

QTc:          382                                    

                           Interpretive Statements

SR

POSSIBLE PRIOR ANTERIOR INFARCT

Electronically Signed On 2017 13:52:58 CST by Carrington Alvarado MD

## 2017-12-11 NOTE — PHYS DOC
Past Medical History


Past Medical History:  A-Fib, Anxiety, Asthma, Bronchitis, Cancer, COPD, 

Depression, Hypothyroid, Pneumonia, Other


Additional Past Medical Histor:  chronic back, lung ca; hernia, heart dis, 

emphysema, Osteopor, home o2=5L


Past Surgical History:  Appendectomy, Cholecystectomy, , Hysterectomy, 

Tonsillectomy, Other


Additional Past Surgical Histo:  back X2, hernia x 3


Alcohol Use:  None


Drug Use:  None





Adult General


Chief Complaint


Chief Complaint:  ALTERED MENTAL STATUS





HPI


HPI





Patient is a 85  year old F who presents with increased shortness of breath and 

wheezing. Patient has a known history of COPD and this morning was having 

worsening shortness of breath and difficulty breathing. Patient O2 saturations 

in the low 70s for EMS. Patient was tachypneic upon arrival to emergency room. 

Patient denied any fevers. Patient does have operative cough. Patient denies 

any chest pain. Patient denies any nausea/vomiting/diarrhea.





Review of Systems


Review of Systems


GEN: Denies fevers, chills, sweats


HEENT: Denies blurred vision, sore throat


CV: Denies chest pain


RESP: Shortness of breath


GI: Denies n/v/d


NEURO: Denies confusion, dizziness


MSK: Denies weakness, joint pain/swelling





All other systems were reviewed and found to be within normal limits, except as 

documented in this note.





Current Medications


Current Medications





Current Medications








 Medications


  (Trade)  Dose


 Ordered  Sig/Franky  Start Time


 Stop Time Status Last Admin


Dose Admin


 


 Albuterol/


 Ipratropium


  (Duoneb)  3 ml  RTQID  17 16:00


 17 15:59 UNV  


 


 


 Fentanyl Citrate


  (Fentanyl 2ml


 Vial)  50 mcg  PRN Q1HR  PRN  17 14:30


 17 14:29 UNV  


 


 


 Lorazepam


  (Ativan)  1 mg  1X  ONCE  17 13:45


 17 13:46 DC 17 13:38


1 MG


 


 Ondansetron HCl


  (Zofran)  4 mg  PRN Q8HRS  PRN  17 14:30


 17 14:29 UNV  


 











Allergies


Allergies





Allergies








Coded Allergies Type Severity Reaction Last Updated Verified


 


  morphine Allergy Intermediate PER DAUGHTER, DOES NOT AGREE WITH PT 14 Yes


 


  adhesive Adverse Reaction Intermediate blisters 4/22/15 Yes


 


  aspirin Adverse Reaction Intermediate Nausea and Vomiting, "makes me sick" 4/

22/15 Yes











Physical Exam


Physical Exam


GEN.:    Moderate distress.  Alert and oriented.


HEENT:    Head is normocephalic, atraumatic


NECK:    Supple.  


LUNGS:    Tachypnea, wheezing bilaterally with decreased air movement at bases 

bilaterally.


HEART:    Irregular-irregular, 4/6 CIARA.  Peripheral pulses intact


ABDOMEN:    Soft, nontender.  Positive bowel sounds.


EXTREMITIES:    Without any cyanosis.    


NEUROLOGIC:     Normal speech, normal tone


PSYCHIATRIC:    Normal affect, normal mood.


SKIN:   No ulcerations





Current Patient Data


Vital Signs





 Vital Signs








  Date Time  Temp Pulse Resp B/P (MAP) Pulse Ox O2 Delivery O2 Flow Rate FiO2


 


17 13:28     92   


 


17 13:25      NonRebreather Mask 15.0 


 


17 12:50 97.6 89 26 117/55 (75)    





 97.6       








Lab Values





 Laboratory Tests








Test


  17


13:00 17


13:14


 


O2 Saturation 93 % (92-99)   


 


Arterial Blood pH


  7.34


(7.35-7.45)  L 


 


 


Arterial Blood pCO2 at


Patient Temp 101 mmHg


(35-46)  *H 


 


 


Arterial Blood pO2 at Patient


Temp 71 mmHg


() 


 


 


Arterial Blood HCO3


  54 mmol/L


(21-28)  H 


 


 


Arterial Blood Base Excess


  24 mmol/L


(-3-3)  H 


 


 


FiO2 100.0   


 


White Blood Count


  


  4.1 x10^3/uL


(4.0-11.0)


 


Red Blood Count


  


  2.90 x10^6/uL


(3.50-5.40)  L


 


Hemoglobin


  


  8.6 g/dL


(12.0-15.5)  L


 


Hematocrit


  


  28.2 %


(36.0-47.0)  L


 


Mean Corpuscular Volume


  


  97 fL ()


 


 


Mean Corpuscular Hemoglobin  30 pg (25-35)  


 


Mean Corpuscular Hemoglobin


Concent 


  30 g/dL


(31-37)  L


 


Red Cell Distribution Width


  


  15.6 %


(11.5-14.5)  H


 


Platelet Count


  


  131 x10^3/uL


(140-400)  L


 


Neutrophils (%) (Auto)  54 % (31-73)  


 


Lymphocytes (%) (Auto)  27 % (24-48)  


 


Monocytes (%) (Auto)  17 % (0-9)  H


 


Eosinophils (%) (Auto)  2 % (0-3)  


 


Basophils (%) (Auto)  1 % (0-3)  


 


Neutrophils # (Auto)


  


  2.2 x10^3uL


(1.8-7.7)


 


Lymphocytes # (Auto)


  


  1.1 x10^3/uL


(1.0-4.8)


 


Monocytes # (Auto)


  


  0.7 x10^3/uL


(0.0-1.1)


 


Eosinophils # (Auto)


  


  0.1 x10^3/uL


(0.0-0.7)


 


Basophils # (Auto)


  


  0.0 x10^3/uL


(0.0-0.2)


 


Lactic Acid Level


  


  0.8 mmol/L


(0.4-2.0)





 Laboratory Tests


17 13:14














EKG


EKG


1258: EKG shows A. fib rate of 73 no STEMI[]





Radiology/Procedures


Radiology/Procedures


CXR:


IMPRESSION:


1. Cardiomegaly and aortic atherosclerosis.


2. Prominent pulmonary markings are essentially unchanged since 11/15/2017 and


are probably predominantly due to fibrosis. A component of chronic or


recurrent interstitial edema cannot be excluded.[]





Course & Med Decision Making


Course & Med Decision Making


Pertinent Labs and Imaging studies reviewed. (See chart for details)





ED course:


Patient was seen and examined upon arrival to emergency room and patient was 

immediately placed on BiPAP due to his severe respiratory distress, patient is 

septic workup ordered


Patient's ABG showed PCO2 of 102 and patient was given 1 mg of Ativan to help 

control her anxiety while being on BiPAP


Discussed results with family and plan to admit the patient for further 

evaluation and management


1407: Discussed CC/HP/PMH with Dr. Tabor and recommends admit with Dr. Briscoe on 

consult 





MDM:


After reviewing the chart, CC/HPI/PMH, physical exam, [lab results], [

radiological results], I believe the patient has an acute respiratory distress 

and COPD exacerbation with a PCO2 of 102 requiring BiPAP and admission for 

further evaluation and management. Given the patient's significant rust or 

distress started empiric antibiotics. Patient is resting comfortably on BiPAP.





Dragon Disclaimer


Dragon Disclaimer


This electronic medical record was generated, in whole or in part, using a 

voice recognition dictation system.





Departure


Departure


Impression:  


 Primary Impression:  


 Hypercapnia


 Additional Impressions:  


 COPD (chronic obstructive pulmonary disease)


 Respiratory distress


Disposition:  09 ADMITTED AS INPATIENT


Admitting Physician:  Sg Tabor


Condition:  STABLE


Referrals:  


SG TABOR MD (PCP)





Problem Qualifiers











ZITA MCMILLAN DO Dec 11, 2017 13:58

## 2017-12-11 NOTE — RAD
Portable chest, 12/11/2017:



History: Shortness of breath



Comparison is made to a study from 11/15/2017. The heart is mildly enlarged.

There is calcific plaquing of the aorta. The pulmonary markings remain

prominent in an interstitial pattern. There are focal linear opacities in both

lung bases compatible with scarring and/or recurrent atelectasis. Similar

findings were present on the previous study. No definite pleural fluid or

pneumothorax is seen.



The bony structures are severely demineralized. Kyphoplasty/vertebroplasty

changes are present at multiple levels in the thoracolumbar spine.



IMPRESSION:

1. Cardiomegaly and aortic atherosclerosis.

2. Prominent pulmonary markings are essentially unchanged since 11/15/2017 and

are probably predominantly due to fibrosis. A component of chronic or

recurrent interstitial edema cannot be excluded.

## 2017-12-12 VITALS — DIASTOLIC BLOOD PRESSURE: 43 MMHG | SYSTOLIC BLOOD PRESSURE: 92 MMHG

## 2017-12-12 VITALS — DIASTOLIC BLOOD PRESSURE: 65 MMHG | SYSTOLIC BLOOD PRESSURE: 115 MMHG

## 2017-12-12 VITALS — DIASTOLIC BLOOD PRESSURE: 55 MMHG | SYSTOLIC BLOOD PRESSURE: 103 MMHG

## 2017-12-12 VITALS — SYSTOLIC BLOOD PRESSURE: 102 MMHG | DIASTOLIC BLOOD PRESSURE: 48 MMHG

## 2017-12-12 VITALS — SYSTOLIC BLOOD PRESSURE: 106 MMHG | DIASTOLIC BLOOD PRESSURE: 58 MMHG

## 2017-12-12 VITALS — SYSTOLIC BLOOD PRESSURE: 93 MMHG | DIASTOLIC BLOOD PRESSURE: 62 MMHG

## 2017-12-12 VITALS — DIASTOLIC BLOOD PRESSURE: 53 MMHG | SYSTOLIC BLOOD PRESSURE: 94 MMHG

## 2017-12-12 VITALS — SYSTOLIC BLOOD PRESSURE: 97 MMHG | DIASTOLIC BLOOD PRESSURE: 45 MMHG

## 2017-12-12 VITALS — DIASTOLIC BLOOD PRESSURE: 51 MMHG | SYSTOLIC BLOOD PRESSURE: 122 MMHG

## 2017-12-12 VITALS — SYSTOLIC BLOOD PRESSURE: 119 MMHG | DIASTOLIC BLOOD PRESSURE: 65 MMHG

## 2017-12-12 VITALS — DIASTOLIC BLOOD PRESSURE: 61 MMHG | SYSTOLIC BLOOD PRESSURE: 116 MMHG

## 2017-12-12 VITALS — SYSTOLIC BLOOD PRESSURE: 121 MMHG | DIASTOLIC BLOOD PRESSURE: 56 MMHG

## 2017-12-12 VITALS — DIASTOLIC BLOOD PRESSURE: 44 MMHG | SYSTOLIC BLOOD PRESSURE: 106 MMHG

## 2017-12-12 VITALS — SYSTOLIC BLOOD PRESSURE: 119 MMHG | DIASTOLIC BLOOD PRESSURE: 58 MMHG

## 2017-12-12 VITALS — SYSTOLIC BLOOD PRESSURE: 97 MMHG | DIASTOLIC BLOOD PRESSURE: 51 MMHG

## 2017-12-12 VITALS — DIASTOLIC BLOOD PRESSURE: 46 MMHG | SYSTOLIC BLOOD PRESSURE: 96 MMHG

## 2017-12-12 VITALS — SYSTOLIC BLOOD PRESSURE: 111 MMHG | DIASTOLIC BLOOD PRESSURE: 56 MMHG

## 2017-12-12 VITALS — DIASTOLIC BLOOD PRESSURE: 60 MMHG | SYSTOLIC BLOOD PRESSURE: 129 MMHG

## 2017-12-12 VITALS — SYSTOLIC BLOOD PRESSURE: 72 MMHG | DIASTOLIC BLOOD PRESSURE: 55 MMHG

## 2017-12-12 VITALS — DIASTOLIC BLOOD PRESSURE: 55 MMHG | SYSTOLIC BLOOD PRESSURE: 120 MMHG

## 2017-12-12 VITALS — DIASTOLIC BLOOD PRESSURE: 56 MMHG | SYSTOLIC BLOOD PRESSURE: 118 MMHG

## 2017-12-12 VITALS — DIASTOLIC BLOOD PRESSURE: 55 MMHG | SYSTOLIC BLOOD PRESSURE: 105 MMHG

## 2017-12-12 VITALS — SYSTOLIC BLOOD PRESSURE: 104 MMHG | DIASTOLIC BLOOD PRESSURE: 49 MMHG

## 2017-12-12 VITALS — DIASTOLIC BLOOD PRESSURE: 47 MMHG | SYSTOLIC BLOOD PRESSURE: 73 MMHG

## 2017-12-12 VITALS — DIASTOLIC BLOOD PRESSURE: 46 MMHG | SYSTOLIC BLOOD PRESSURE: 76 MMHG

## 2017-12-12 VITALS — SYSTOLIC BLOOD PRESSURE: 84 MMHG | DIASTOLIC BLOOD PRESSURE: 46 MMHG

## 2017-12-12 VITALS — DIASTOLIC BLOOD PRESSURE: 65 MMHG | SYSTOLIC BLOOD PRESSURE: 114 MMHG

## 2017-12-12 VITALS — DIASTOLIC BLOOD PRESSURE: 50 MMHG | SYSTOLIC BLOOD PRESSURE: 106 MMHG

## 2017-12-12 VITALS — DIASTOLIC BLOOD PRESSURE: 46 MMHG | SYSTOLIC BLOOD PRESSURE: 98 MMHG

## 2017-12-12 LAB
ANION GAP SERPL CALC-SCNC: 2 MMOL/L (ref 6–14)
BASOPHILS # BLD AUTO: 0 X10^3/UL (ref 0–0.2)
BASOPHILS NFR BLD: 0 % (ref 0–3)
BUN SERPL-MCNC: 16 MG/DL (ref 7–20)
CALCIUM SERPL-MCNC: 8.3 MG/DL (ref 8.5–10.1)
CHLORIDE SERPL-SCNC: 99 MMOL/L (ref 98–107)
CO2 SERPL-SCNC: 41 MMOL/L (ref 21–32)
CREAT SERPL-MCNC: 0.9 MG/DL (ref 0.6–1)
EOSINOPHIL NFR BLD: 0 % (ref 0–3)
ERYTHROCYTE [DISTWIDTH] IN BLOOD BY AUTOMATED COUNT: 15.7 % (ref 11.5–14.5)
GFR SERPLBLD BASED ON 1.73 SQ M-ARVRAT: 59.5 ML/MIN
GLUCOSE SERPL-MCNC: 97 MG/DL (ref 70–99)
HCO3 BLDA-SCNC: 38 MMOL/L (ref 21–28)
HCO3 BLDA-SCNC: 51 MMOL/L (ref 21–28)
HCT VFR BLD CALC: 27.9 % (ref 36–47)
HGB BLD-MCNC: 8.6 G/DL (ref 12–15.5)
INSPIRATION SETTING TIME VENT: 100
INSPIRATION SETTING TIME VENT: 50
LYMPHOCYTES # BLD: 0.5 X10^3/UL (ref 1–4.8)
LYMPHOCYTES NFR BLD AUTO: 12 % (ref 24–48)
MCH RBC QN AUTO: 30 PG (ref 25–35)
MCHC RBC AUTO-ENTMCNC: 31 G/DL (ref 31–37)
MCV RBC AUTO: 96 FL (ref 79–100)
MONOCYTES NFR BLD: 4 % (ref 0–9)
NEUTROPHILS NFR BLD AUTO: 84 % (ref 31–73)
PCO2 BLDA: 100 MMHG (ref 35–46)
PCO2 BLDA: 59 MMHG (ref 35–46)
PH ABG: 7.32 (ref 7.35–7.45)
PH ABG: 7.43 (ref 7.35–7.45)
PLATELET # BLD AUTO: 60 X10^3/UL (ref 140–400)
PO2 BLDA: 252 MMHG (ref 65–108)
PO2 BLDA: 71 MMHG (ref 65–108)
POTASSIUM SERPL-SCNC: 4.6 MMOL/L (ref 3.5–5.1)
RBC # BLD AUTO: 2.9 X10^6/UL (ref 3.5–5.4)
SAO2 % BLDA: 94 % (ref 92–99)
SAO2 % BLDA: 99 % (ref 92–99)
SODIUM SERPL-SCNC: 142 MMOL/L (ref 136–145)
WBC # BLD AUTO: 4.6 X10^3/UL (ref 4–11)

## 2017-12-12 PROCEDURE — 5A09357 ASSISTANCE WITH RESPIRATORY VENTILATION, LESS THAN 24 CONSECUTIVE HOURS, CONTINUOUS POSITIVE AIRWAY PRESSURE: ICD-10-PCS | Performed by: INTERNAL MEDICINE

## 2017-12-12 RX ADMIN — ONDANSETRON SCH MG: 4 TABLET, ORALLY DISINTEGRATING ORAL at 14:00

## 2017-12-12 RX ADMIN — CEFTRIAXONE SCH GM: 1 INJECTION, POWDER, FOR SOLUTION INTRAMUSCULAR; INTRAVENOUS at 19:00

## 2017-12-12 RX ADMIN — ONDANSETRON SCH MG: 4 TABLET, ORALLY DISINTEGRATING ORAL at 21:41

## 2017-12-12 RX ADMIN — DOCUSATE SODIUM SCH MG: 100 CAPSULE, LIQUID FILLED ORAL at 10:08

## 2017-12-12 RX ADMIN — VITAMIN D, TAB 1000IU (100/BT) SCH UNIT: 25 TAB at 10:07

## 2017-12-12 RX ADMIN — CITALOPRAM HYDROBROMIDE SCH MG: 20 TABLET ORAL at 10:07

## 2017-12-12 RX ADMIN — BACITRACIN SCH MLS/HR: 5000 INJECTION, POWDER, FOR SOLUTION INTRAMUSCULAR at 22:00

## 2017-12-12 RX ADMIN — ONDANSETRON SCH MG: 4 TABLET, ORALLY DISINTEGRATING ORAL at 05:55

## 2017-12-12 RX ADMIN — TRAZODONE HYDROCHLORIDE SCH MG: 100 TABLET ORAL at 21:00

## 2017-12-12 RX ADMIN — DIGOXIN SCH MCG: 125 TABLET ORAL at 10:08

## 2017-12-12 RX ADMIN — LEVOTHYROXINE SODIUM SCH MCG: 88 TABLET ORAL at 06:37

## 2017-12-12 RX ADMIN — IPRATROPIUM BROMIDE AND ALBUTEROL SULFATE SCH ML: .5; 3 SOLUTION RESPIRATORY (INHALATION) at 07:38

## 2017-12-12 RX ADMIN — METHYLPREDNISOLONE SODIUM SUCCINATE SCH MG: 125 INJECTION, POWDER, FOR SOLUTION INTRAMUSCULAR; INTRAVENOUS at 14:00

## 2017-12-12 RX ADMIN — BACITRACIN SCH MLS/HR: 5000 INJECTION, POWDER, FOR SOLUTION INTRAMUSCULAR at 18:59

## 2017-12-12 RX ADMIN — CYANOCOBALAMIN TAB 1000 MCG SCH MCG: 1000 TAB at 10:07

## 2017-12-12 RX ADMIN — METHYLPREDNISOLONE SODIUM SUCCINATE SCH MG: 125 INJECTION, POWDER, FOR SOLUTION INTRAMUSCULAR; INTRAVENOUS at 21:42

## 2017-12-12 RX ADMIN — IPRATROPIUM BROMIDE AND ALBUTEROL SULFATE SCH ML: .5; 3 SOLUTION RESPIRATORY (INHALATION) at 20:01

## 2017-12-12 RX ADMIN — IPRATROPIUM BROMIDE AND ALBUTEROL SULFATE SCH ML: .5; 3 SOLUTION RESPIRATORY (INHALATION) at 15:17

## 2017-12-12 RX ADMIN — IPRATROPIUM BROMIDE AND ALBUTEROL SULFATE SCH ML: .5; 3 SOLUTION RESPIRATORY (INHALATION) at 11:22

## 2017-12-12 RX ADMIN — Medication SCH CAP: at 21:00

## 2017-12-12 RX ADMIN — METHYLPREDNISOLONE SODIUM SUCCINATE SCH MG: 125 INJECTION, POWDER, FOR SOLUTION INTRAMUSCULAR; INTRAVENOUS at 05:46

## 2017-12-12 RX ADMIN — SENNOSIDES A AND B SCH MG: 8.6 TABLET, FILM COATED ORAL at 10:07

## 2017-12-12 RX ADMIN — BACITRACIN SCH MLS/HR: 5000 INJECTION, POWDER, FOR SOLUTION INTRAMUSCULAR at 01:50

## 2017-12-12 NOTE — CONS
DATE OF CONSULTATION:  



ATTENDING PHYSICIAN:  Dr. Couch.



REASON FOR CONSULTATION:  Respiratory failure.



HISTORY OF PRESENT ILLNESS:  The patient is very well known to me.  She is an

85-year-old pleasant female, who has history of COPD with chronic hypoxic and

hypercapnic respiratory failure and history of lung cancer which has responded

well to prior treatment.  She presented to the hospital with increasing

shortness of breath and wheezing.  The patient was also lethargic.  Saturations

were in the low 70s.  She was tachypneic on arrival.  She does not have any

cough, fever, chills.  No chest pain, no headaches, no nausea, vomiting or

diarrhea.  Last night when consultation was called to me, her initial arterial

blood gases had a pH of 7.34, pCO2 of 101 and a pO2 of 71 with a bicarbonate of

54 on 100% FiO2.  The patient was placed on BiPAP and last night she was still

acidotic, but after all night BiPAP this morning, her pH was 7.43, pCO2 of 59

and a pO2 of 71 on 50% FiO2.  She is awake, following commands.  She does not

remember what happened and what brought her to the hospital.  I have reviewed

the patient's imaging studies, which included a CT chest without contrast and it

shows no evidence of any pneumonia or lung mass.  There is some atelectasis in

the lung bases and a chronic tiny right pleural effusion.  No definite

consolidation seen.  Consultation requested for further evaluation and

management.



PAST MEDICAL HISTORY:  Significant for history of AFib, history of anxiety,

history of COPD with chronic hypoxic and hypercapnic respiratory failure,

history of lung cancer with significant response to treatment, history of

depression, hypothyroidism, pneumonia, osteoporosis, chronic back pain.



PAST SURGICAL HISTORY:  Appendectomy, cholecystectomy, , hysterectomy,

tonsillectomy, back surgery and hernia surgery.



ALLERGIES:  ASPIRIN AND MORPHINE.



MEDICATIONS:  All reviewed as listed in the MRAD including Rocephin, DuoNeb and

IV steroids.  The patient also takes oxycodone 1 tablet t.i.d. p.r.n. at home

and Xanax p.r.n.



SOCIAL HISTORY:  She has long history of tobacco use, but quit many years ago.



FAMILY HISTORY:  Noncontributory to lungs.



PHYSICAL EXAMINATION:

GENERAL:  She is awake, following commands.  She has no recollection of the

events at home.

VITAL SIGNS:  Blood pressure 121/56, afebrile, pulse ox is 94-95% on 50%

Ventimask and 2 liters cannula.

HEENT:  Sclerae nonicteric.

NECK:  Supple.

LUNGS:  Diminished breath sounds.  No wheezing, no crackles.

CARDIOVASCULAR:  Regular rate and rhythm.

ABDOMEN:  Soft.

EXTREMITIES:  No pitting edema.



LABORATORY DATA:  Reviewed.  ABGs are discussed in my history of present

illness.  BUN is 16, creatinine 0.9, bicarbonate is down from 52-41.  White cell

count 4.6, hemoglobin 8.6 and platelets are 60, which is down from 131.



IMPRESSION:

1.  Acute on chronic hypercapnic and hypoxic respiratory failure secondary to

combination of acute exacerbation of chronic obstructive pulmonary disease, and

use of narcotics.

2.  Acute toxic encephalopathy secondary to use of narcotics.  She is on

Percocet t.i.d. p.r.n.

3.  History of lung cancer with excellent response to prior treatment with no

residual mass seen in the right chest.

4.  Anemia.

5.  Thrombocytopenia could be related to inflammatory etiology.  Will need to be

monitored closely.  She is not on any Lovenox.

6.  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

hypercapnic respiratory failure.



RECOMMENDATIONS:

1.  We will continue the Venturi mask during the day and BiPAP at bedtime for a

few nights.

2.  Continue bronchodilators.

3.  Continue steroids.

4.  Empiric antibiotics for now.

5.  Steroid taper.

6.  Minimize the use of benzodiazepine and narcotics, and the medication doses

should be adjusted for home medication at discharge.

7.  Discussed with RN and RT and Dr. Couch as well.  We will follow along with

you.



Critical care time 39 minutes.

 



______________________________

EDDIE ANDRE MD



DR:  CATARINA/aidan  JOB#:  [id number] / [id number]

DD:  2017 10:31  DT:  2017 11:04

## 2017-12-12 NOTE — HP
ADMIT DATE:  2017



PATIENT LOCATION:  ICU room 110.



REASON FOR ADMISSION TO THE HOSPITAL:  Hypercapnic acute respiratory failure.



HISTORY OF PRESENT ILLNESS:  The patient is an 85-year-old female, patient well

known to me.  She has a history of chronic COPD, on home oxygen 5 liters.  She

also has history of lung cancer, had radiation and chemo; multiple osteoporosis

and compression fractures.  She has been not feeling well for one week, came to

the Emergency Room with increased shortness of breath and wheezing.  Her oxygen

saturation was less than 70 per EMS, was tachypneic.  The patient was put on

BiPAP.  Her CO2 was 90, pH was 7.3.  The patient was admitted to the ICU and was

given Solu-Medrol, oxygen and breathing treatments.  Chest x-ray, chronic

infiltrates.  CT chest shows emphysema and Pulmonary was consulted.



PAST MEDICAL HISTORY:  Has atrial fibrillation, anxiety, COPD, lung cancer,

hypothyroidism, multiple compression fractures and depression.



PAST SURGICAL HISTORY:  Appendectomy, gallbladder surgery, ,

hysterectomy, tonsillectomy, had a lung biopsy, had radiation and chemo, back

surgeries and hernia surgeries.



PERSONAL HISTORY:  Smoked for at least 50 years, quit 5 years ago.  Denies

alcohol or drug abuse.



SOCIAL HISTORY:  Lives at home with her daughter.  She is on home oxygen 5

liters, ambulates with a walker.



ALLERGIES:  ALLERGY TO ASPIRIN, MORPHINE AND ADHESIVE TAPE.



MEDICATIONS AT HOME:  Xanax 0.25 three times daily, vitamin D 1000 daily, B12 at

1000 mcg daily, digoxin 125 mcg daily, Colace 100 mg daily, Lexapro 10 mg daily,

DuoNeb 4 times daily, levothyroxine 88 mcg daily, Zoloft 4 mg p.r.n., oxycodone

10 mg q. 6h. p.r.n., senna daily and trazodone 100 mg daily.



REVIEW OF SYMPTOMS:  Complains of feeling weak, short of breath, not feeling

well for one week.  Denies any fever.  Cough present.  Rest of her systems were

reviewed and negative.



PHYSICAL EXAMINATION:

GENERAL:  On examination, elderly female, looks older, getting weaker recently.

VITAL SIGNS:  Temperature 97, pulse 89, respirations 26, blood pressure 117/55

and oxygen 78% on 5 liters.

HEENT:  Head is atraumatic.  Pupils equal.  Oral cavity, dentures.

NECK:  Supple.  Thyroid not enlarged.  JVD not elevated.

CHEST:  Symmetrical, COPD pattern.

CARDIOVASCULAR:  S1, S2 regular.

LUNGS:  Diminished breath sounds, bilateral wheezing.

ABDOMEN:  Soft.  Scars of previous abdominal surgeries.  No mass palpable.

EXTERNAL GENITALIA:  No Gusman.

RECTAL:  Deferred.

EXTREMITIES:  No calf tenderness, no edema.  Pulses 1+.

NEUROLOGICAL EXAMINATION:  The patient is awake, recognized me, answers 1-2 word

questions, moving extremities.  She says she does not like oxygen and CPAP mask.



LABORATORY DATA:  Shows a white count of 4, hemoglobin 8.6 and platelets

131,000.  Electrolytes show sodium 143, potassium 4.1, chloride 99, bicarbonate

52, BUN 15, creatinine 1.0 and glucose 93.  Lactic acid 0.7.  LFTs were normal. 

Troponin was negative.  Chest x-ray shows a cardiomegaly, prominent lung

markings.  CT chest shows severe emphysema, cardiomegaly, coronary artery

calcification and hiatal hernia.



Laboratory data on the blood gas shows pH of 7.34, pCO2 of 101, pO2 of 71,

bicarbonate 54, base excess 24, 100% oxygen and 93 saturation.



FINAL IMPRESSION:

1.  Hypercapnic acute respiratory failure.

2.  Chronic obstructive pulmonary disease with hypoxia, on 5 liters oxygen.

3.  History of lung cancer, had radiation and chemo 5 years ago.

4.  Anxiety.

5.  Depression.

6.  Hiatal hernia.

7.  Chronic pain syndrome.



PLAN:  At this time, the patient admitted to the hospital and was on BiPAP.  Now

change it to Ventimask.  The patient has a living will, does not want intubated,

was to be a DNR.  IV Solu-Medrol, IV Rocephin and DuoNeb 4 times daily.  DVT

prophylaxis, GI prophylaxis, Pulmonary consult and IV fluids and see how the

patient's condition improves.  The patient is up-to-date on flu and pneumonia

shots.  She does not smoke now for the last 5 years.



PROGNOSIS:  Guarded.

 



______________________________

SUMMER TABOR MD



DR:  RAGINI/aidan  JOB#:  [id number] / [id number]

DD:  2017 10:36  DT:  2017 11:18

## 2017-12-12 NOTE — PDOC
Provider Note


Provider Note


Pt seen in ICU.H&P dictated.#[id number]


spoke with pulmonary.


DNR code status changed as per pts wishes











SUMMER TABOR MD Dec 12, 2017 10:37

## 2017-12-13 VITALS — DIASTOLIC BLOOD PRESSURE: 55 MMHG | SYSTOLIC BLOOD PRESSURE: 87 MMHG

## 2017-12-13 VITALS — DIASTOLIC BLOOD PRESSURE: 59 MMHG | SYSTOLIC BLOOD PRESSURE: 112 MMHG

## 2017-12-13 VITALS — DIASTOLIC BLOOD PRESSURE: 63 MMHG | SYSTOLIC BLOOD PRESSURE: 107 MMHG

## 2017-12-13 VITALS — SYSTOLIC BLOOD PRESSURE: 131 MMHG | DIASTOLIC BLOOD PRESSURE: 61 MMHG

## 2017-12-13 VITALS — SYSTOLIC BLOOD PRESSURE: 142 MMHG | DIASTOLIC BLOOD PRESSURE: 61 MMHG

## 2017-12-13 VITALS — DIASTOLIC BLOOD PRESSURE: 48 MMHG | SYSTOLIC BLOOD PRESSURE: 99 MMHG

## 2017-12-13 VITALS — DIASTOLIC BLOOD PRESSURE: 59 MMHG | SYSTOLIC BLOOD PRESSURE: 123 MMHG

## 2017-12-13 VITALS — SYSTOLIC BLOOD PRESSURE: 107 MMHG | DIASTOLIC BLOOD PRESSURE: 51 MMHG

## 2017-12-13 VITALS — SYSTOLIC BLOOD PRESSURE: 92 MMHG | DIASTOLIC BLOOD PRESSURE: 48 MMHG

## 2017-12-13 VITALS — DIASTOLIC BLOOD PRESSURE: 52 MMHG | SYSTOLIC BLOOD PRESSURE: 111 MMHG

## 2017-12-13 VITALS — DIASTOLIC BLOOD PRESSURE: 56 MMHG | SYSTOLIC BLOOD PRESSURE: 111 MMHG

## 2017-12-13 VITALS — DIASTOLIC BLOOD PRESSURE: 57 MMHG | SYSTOLIC BLOOD PRESSURE: 111 MMHG

## 2017-12-13 VITALS — DIASTOLIC BLOOD PRESSURE: 41 MMHG | SYSTOLIC BLOOD PRESSURE: 85 MMHG

## 2017-12-13 LAB
ANION GAP SERPL CALC-SCNC: 1 MMOL/L (ref 6–14)
BASOPHILS # BLD AUTO: 0 X10^3/UL (ref 0–0.2)
BASOPHILS NFR BLD: 0 % (ref 0–3)
BUN SERPL-MCNC: 24 MG/DL (ref 7–20)
CALCIUM SERPL-MCNC: 8.5 MG/DL (ref 8.5–10.1)
CHLORIDE SERPL-SCNC: 99 MMOL/L (ref 98–107)
CO2 SERPL-SCNC: 40 MMOL/L (ref 21–32)
CREAT SERPL-MCNC: 1.2 MG/DL (ref 0.6–1)
EOSINOPHIL NFR BLD: 0 % (ref 0–3)
ERYTHROCYTE [DISTWIDTH] IN BLOOD BY AUTOMATED COUNT: 16.2 % (ref 11.5–14.5)
GFR SERPLBLD BASED ON 1.73 SQ M-ARVRAT: 42.7 ML/MIN
GLUCOSE SERPL-MCNC: 130 MG/DL (ref 70–99)
HCT VFR BLD CALC: 25.9 % (ref 36–47)
HGB BLD-MCNC: 7.8 G/DL (ref 12–15.5)
LYMPHOCYTES # BLD: 0.5 X10^3/UL (ref 1–4.8)
LYMPHOCYTES NFR BLD AUTO: 9 % (ref 24–48)
MCH RBC QN AUTO: 29 PG (ref 25–35)
MCHC RBC AUTO-ENTMCNC: 30 G/DL (ref 31–37)
MCV RBC AUTO: 96 FL (ref 79–100)
MONOCYTES NFR BLD: 5 % (ref 0–9)
NEUTROPHILS NFR BLD AUTO: 86 % (ref 31–73)
PLATELET # BLD AUTO: 71 X10^3/UL (ref 140–400)
PLATELET # BLD EST: ADEQUATE 10*3/UL
POTASSIUM SERPL-SCNC: 4.6 MMOL/L (ref 3.5–5.1)
RBC # BLD AUTO: 2.69 X10^6/UL (ref 3.5–5.4)
SODIUM SERPL-SCNC: 140 MMOL/L (ref 136–145)
WBC # BLD AUTO: 5.8 X10^3/UL (ref 4–11)

## 2017-12-13 RX ADMIN — METHYLPREDNISOLONE SODIUM SUCCINATE SCH MG: 125 INJECTION, POWDER, FOR SOLUTION INTRAMUSCULAR; INTRAVENOUS at 20:58

## 2017-12-13 RX ADMIN — CITALOPRAM HYDROBROMIDE SCH MG: 20 TABLET ORAL at 09:50

## 2017-12-13 RX ADMIN — VITAMIN D, TAB 1000IU (100/BT) SCH UNIT: 25 TAB at 09:49

## 2017-12-13 RX ADMIN — METHYLPREDNISOLONE SODIUM SUCCINATE SCH MG: 125 INJECTION, POWDER, FOR SOLUTION INTRAMUSCULAR; INTRAVENOUS at 06:21

## 2017-12-13 RX ADMIN — Medication SCH CAP: at 09:49

## 2017-12-13 RX ADMIN — CYANOCOBALAMIN TAB 1000 MCG SCH MCG: 1000 TAB at 09:50

## 2017-12-13 RX ADMIN — BACITRACIN SCH MLS/HR: 5000 INJECTION, POWDER, FOR SOLUTION INTRAMUSCULAR at 04:44

## 2017-12-13 RX ADMIN — CEFTRIAXONE SCH GM: 1 INJECTION, POWDER, FOR SOLUTION INTRAMUSCULAR; INTRAVENOUS at 13:52

## 2017-12-13 RX ADMIN — IPRATROPIUM BROMIDE AND ALBUTEROL SULFATE SCH ML: .5; 3 SOLUTION RESPIRATORY (INHALATION) at 21:11

## 2017-12-13 RX ADMIN — METHYLPREDNISOLONE SODIUM SUCCINATE SCH MG: 125 INJECTION, POWDER, FOR SOLUTION INTRAMUSCULAR; INTRAVENOUS at 13:52

## 2017-12-13 RX ADMIN — DOCUSATE SODIUM SCH MG: 100 CAPSULE, LIQUID FILLED ORAL at 09:49

## 2017-12-13 RX ADMIN — IPRATROPIUM BROMIDE AND ALBUTEROL SULFATE SCH ML: .5; 3 SOLUTION RESPIRATORY (INHALATION) at 13:47

## 2017-12-13 RX ADMIN — ONDANSETRON SCH MG: 4 TABLET, ORALLY DISINTEGRATING ORAL at 06:26

## 2017-12-13 RX ADMIN — ONDANSETRON SCH MG: 4 TABLET, ORALLY DISINTEGRATING ORAL at 13:53

## 2017-12-13 RX ADMIN — Medication SCH CAP: at 20:58

## 2017-12-13 RX ADMIN — DIGOXIN SCH MCG: 125 TABLET ORAL at 09:49

## 2017-12-13 RX ADMIN — LEVOTHYROXINE SODIUM SCH MCG: 88 TABLET ORAL at 06:24

## 2017-12-13 RX ADMIN — IPRATROPIUM BROMIDE AND ALBUTEROL SULFATE SCH ML: .5; 3 SOLUTION RESPIRATORY (INHALATION) at 08:14

## 2017-12-13 RX ADMIN — SENNOSIDES A AND B SCH MG: 8.6 TABLET, FILM COATED ORAL at 09:49

## 2017-12-13 RX ADMIN — BACITRACIN SCH MLS/HR: 5000 INJECTION, POWDER, FOR SOLUTION INTRAMUSCULAR at 18:27

## 2017-12-13 RX ADMIN — ONDANSETRON SCH MG: 4 TABLET, ORALLY DISINTEGRATING ORAL at 20:57

## 2017-12-13 RX ADMIN — TRAZODONE HYDROCHLORIDE SCH MG: 100 TABLET ORAL at 20:57

## 2017-12-13 RX ADMIN — IPRATROPIUM BROMIDE AND ALBUTEROL SULFATE SCH ML: .5; 3 SOLUTION RESPIRATORY (INHALATION) at 12:00

## 2017-12-13 NOTE — PDOC
PROGRESS NOTES


Subjective


Subjective


off BIPAP, doing well on oxygen





Objective


Objective





Vital Signs








  Date Time  Temp Pulse Resp B/P (MAP) Pulse Ox O2 Delivery O2 Flow Rate FiO2


 


12/13/17 09:49  85  107/51    


 


12/13/17 08:14     90 Simple Mask 6.0 


 


12/13/17 07:00   30     


 


12/13/17 04:00 98.6       





 98.6       














Intake and Output 


 


 12/13/17





 07:00


 


Intake Total 1524 ml


 


Output Total 955 ml


 


Balance 569 ml


 


 


 


Intake Oral 500 ml


 


IV Total 1024 ml


 


Output Urine Total 955 ml


 


# Voids 2











Physical Exam


Abdomen:  Normal bowel sounds, Soft


Heart:  Regular rate, Normal S1


Extremities:  No clubbing


General:  Alert


HEENT:  Atraumatic


Lungs:  Clear to auscultation


MUSCULOSKELETAL:  No swelling


Neuro:  Normal speech


Psych/Mental Status:  Mood NL


Skin:  No significant lesion





Diagnosis


Problem List


Problems


Medical Problems:


(1) COPD (chronic obstructive pulmonary disease)


Status: Acute  





(2) Respiratory distress


Status: Acute  











Assessment


Assessment


Problems


Medical Problems:


(1) COPD (chronic obstructive pulmonary disease)


Status: Acute  





(2) Respiratory distress


Status: Acute  





FINAL IMPRESSION:


1.  Hypercapnic acute respiratory failure.


2.  Chronic obstructive pulmonary disease with hypoxia, on 5 liters oxygen.


3.  History of lung cancer, had radiation and chemo 5 years ago.


4.  Anxiety.


5.  Depression.


6.  Hiatal hernia.


7.  Chronic pain syndrome.





PLAN: Pt is off bipap.


co2 corrected.


on oxygen nasal canula.


transfer out of icu.


continue steroids.





 At this time, the patient admitted to the hospital and was on BiPAP.  Now


change it to Ventimask.  The patient has a living will, does not want intubated,


was to be a DNR.  IV Solu-Medrol, IV Rocephin and DuoNeb 4 times daily.  DVT


prophylaxis, GI prophylaxis, Pulmonary consult and IV fluids and see how the


patient's condition improves.  The patient is up-to-date on flu and pneumonia


shots.  She does not smoke now for the last 5 years.


Problems:  





Plan


Plan of Care


Problems


Medical Problems:


(1) COPD (chronic obstructive pulmonary disease)


Status: Acute  





(2) Respiratory distress


Status: Acute  








Comment


Review of Relevant


I have reviewed the following items shan (where applicable) has been applied.


Labs





Laboratory Tests








Test


  12/13/17


02:50 12/13/17


08:05


 


Sodium Level


  140 mmol/L


(136-145) 


 


 


Potassium Level


  4.6 mmol/L


(3.5-5.1) 


 


 


Chloride Level


  99 mmol/L


() 


 


 


Carbon Dioxide Level


  40 mmol/L


(21-32) 


 


 


Anion Gap 1 (6-14)  


 


Blood Urea Nitrogen


  24 mg/dL


(7-20) 


 


 


Creatinine


  1.2 mg/dL


(0.6-1.0) 


 


 


Estimated GFR


(Cockcroft-Gault) 42.7 


  


 


 


Glucose Level


  130 mg/dL


(70-99) 


 


 


Calcium Level


  8.5 mg/dL


(8.5-10.1) 


 


 


White Blood Count


  


  5.8 x10^3/uL


(4.0-11.0)


 


Red Blood Count


  


  2.69 x10^6/uL


(3.50-5.40)


 


Hemoglobin


  


  7.8 g/dL


(12.0-15.5)


 


Hematocrit


  


  25.9 %


(36.0-47.0)


 


Mean Corpuscular Volume  96 fL () 


 


Mean Corpuscular Hemoglobin  29 pg (25-35) 


 


Mean Corpuscular Hemoglobin


Concent 


  30 g/dL


(31-37)


 


Red Cell Distribution Width


  


  16.2 %


(11.5-14.5)


 


Platelet Count


  


  71 x10^3/uL


(140-400)


 


Neutrophils (%) (Auto)  86 % (31-73) 


 


Lymphocytes (%) (Auto)  9 % (24-48) 


 


Monocytes (%) (Auto)  5 % (0-9) 


 


Eosinophils (%) (Auto)  0 % (0-3) 


 


Basophils (%) (Auto)  0 % (0-3) 


 


Neutrophils # (Auto)


  


  5.0 x10^3uL


(1.8-7.7)


 


Lymphocytes # (Auto)


  


  0.5 x10^3/uL


(1.0-4.8)


 


Monocytes # (Auto)


  


  0.3 x10^3/uL


(0.0-1.1)


 


Eosinophils # (Auto)


  


  0.0 x10^3/uL


(0.0-0.7)


 


Basophils # (Auto)


  


  0.0 x10^3/uL


(0.0-0.2)








Microbiology


12/11/17 Blood Culture - Preliminary, Resulted


           NO GROWTH AFTER 1 DAY


Medications





Current Medications


Ceftriaxone Sodium 1 gm/ Dextrose 50 ml @  100 mls/hr Q24H IV ;  Start 12/12/17 

at 14:00;  Status UNV


Ceftriaxone Sodium (Rocephin) 1 gm Q24H IVP  Last administered on 12/12/17at 19:

00;  Start 12/12/17 at 14:00


Lactobacillus Rhamnosus (Culturelle) 1 cap BID PO  Last administered on 12/13/ 17at 09:49;  Start 12/12/17 at 21:00


Trazodone HCl (Desyrel) 100 mg QHS PO ;  Start 12/12/17 at 21:00;  Status UNV


Vitals/I & O





Vital Sign - Last 24 Hours








 12/12/17 12/12/17 12/12/17 12/12/17





 11:16 11:22 12:00 12:00


 


Temp   98.1 





   98.1 


 


Pulse 73  86 


 


Resp 22  29 


 


B/P (MAP) 118/56 (76)  119/58 (78) 


 


Pulse Ox 92 93 83 


 


O2 Delivery Venturi Mask 50% VM + 2L NC Venturi Mask Venturi Mask


 


O2 Flow Rate 15.0  15.0 15.0


 


    





    





 12/12/17 12/12/17 12/12/17 12/12/17





 13:00 14:00 15:00 15:17


 


Pulse 88 86 98 


 


Resp 22 25 34 


 


B/P (MAP) 129/60 (83) 104/49 (67) 102/48 (66) 


 


Pulse Ox 90 98 82 90


 


O2 Delivery Venturi Mask Nasal Cannula Nasal Cannula Nasal Cannula


 


O2 Flow Rate 15.0 6.0 6.0 7.0





 12/12/17 12/12/17 12/12/17 12/12/17





 16:00 16:00 16:00 17:00


 


Temp 98.0   





 98.0   


 


Pulse   96 84


 


Resp   37 22


 


B/P (MAP)   97/45 (62) 106/44 (64)


 


Pulse Ox   96 93


 


O2 Delivery  Nasal Cannula Nasal Cannula Nasal Cannula


 


O2 Flow Rate  6.0 6.0 6.0


 


    





    





 12/12/17 12/12/17 12/12/17 12/12/17





 18:00 19:02 20:00 20:00


 


Temp   98.1 





   98.1 


 


Pulse 80 98 99 


 


Resp 25 36 35 


 


B/P (MAP) 106/50 (68) 120/55 (76) 111/56 (74) 


 


Pulse Ox 88 90 95 


 


O2 Delivery Nasal Cannula Nasal Cannula Simple Mask 


 


O2 Flow Rate 6.0 6.0 6.0 6.0


 


    





    





 12/12/17 12/12/17 12/12/17 12/12/17





 20:00 20:03 21:00 22:00


 


Pulse   95 95


 


Resp   36 23


 


B/P (MAP)   114/65 (81) 92/43 (59)


 


Pulse Ox  90 90 91


 


O2 Delivery Nasal Cannula Simple Mask Simple Mask Simple Mask


 


O2 Flow Rate 6.0 6.0 6.0 6.0





 12/12/17 12/12/17 12/12/17 12/12/17





 23:00 23:05 23:59 23:59


 


Temp    98.3





    98.3


 


Pulse 101   94


 


Resp 22   20


 


B/P (MAP) 96/46 (63)   98/46 (63)


 


Pulse Ox 93 93  94


 


O2 Delivery BiPAP/CPAP BiPAP/CPAP Bi-pap BiPAP/CPAP


 


    





    





 12/13/17 12/13/17 12/13/17 12/13/17





 01:00 01:00 02:00 02:58


 


Pulse  77 70 


 


Resp  29 19 


 


B/P (MAP)  85/41 (56) 111/57 (75) 


 


Pulse Ox 91 93 94 92


 


O2 Delivery BiPAP/CPAP BiPAP/CPAP BiPAP/CPAP BiPAP/CPAP





 12/13/17 12/13/17 12/13/17 12/13/17





 03:00 04:00 04:00 04:52


 


Temp  98.6  





  98.6  


 


Pulse 74 69  


 


Resp 19 19  


 


B/P (MAP) 111/52 (71) 92/48 (63)  


 


Pulse Ox 95 94  94


 


O2 Delivery BiPAP/CPAP BiPAP/CPAP Bi-pap BiPAP/CPAP


 


    





    





 12/13/17 12/13/17 12/13/17 12/13/17





 05:00 06:00 07:00 08:14


 


Pulse 65 73 65 


 


Resp 22 24 30 


 


B/P (MAP) 87/55 (66) 99/48 (65) 111/56 (74) 


 


Pulse Ox 94 93 96 90


 


O2 Delivery BiPAP/CPAP BiPAP/CPAP Simple Mask Simple Mask


 


O2 Flow Rate   6.0 6.0





 12/13/17   





 09:49   


 


Pulse 85   


 


B/P (MAP) 107/51   














Intake and Output   


 


 12/12/17 12/12/17 12/13/17





 15:00 23:00 07:00


 


Intake Total 100 ml 400 ml 1024 ml


 


Output Total 450 ml 200 ml 305 ml


 


Balance -350 ml 200 ml 719 ml

















SUMMER TABOR MD Dec 13, 2017 10:10

## 2017-12-13 NOTE — PDOC
PULMONARY PROGRESS NOTES


Subjective


feels much better


Vitals





Vital Signs








  Date Time  Temp Pulse Resp B/P (MAP) Pulse Ox O2 Delivery O2 Flow Rate FiO2


 


12/13/17 09:49  85  107/51    


 


12/13/17 09:00 98.1  34  92 Nasal Cannula 6.0 





 98.1       








General:  Alert, No acute distress


HEENT:  Other


Lungs:  Other (decrease bs)


Cardiovascular:  S1, S2


Abdomen:  Soft, Non-tender


Neuro Exam:  Alert


Extremities:  No Edema


Skin:  Warm


Labs





Laboratory Tests








Test


  12/11/17


13:00 12/11/17


13:14 12/11/17


13:50 12/11/17


15:15


 


O2 Saturation 93 % (92-99)    91 % (92-99) 


 


Arterial Blood pH


  7.34


(7.35-7.45) 


  


  7.36


(7.35-7.45)


 


Arterial Blood pCO2 at


Patient Temp 101 mmHg


(35-46) 


  


  90 mmHg


(35-46)


 


Arterial Blood pO2 at Patient


Temp 71 mmHg


() 


  


  64 mmHg


()


 


Arterial Blood HCO3


  54 mmol/L


(21-28) 


  


  50 mmol/L


(21-28)


 


Arterial Blood Base Excess


  24 mmol/L


(-3-3) 


  


  21 mmol/L


(-3-3)


 


FiO2 100.0    40.0 


 


White Blood Count


  


  4.1 x10^3/uL


(4.0-11.0) 


  


 


 


Red Blood Count


  


  2.90 x10^6/uL


(3.50-5.40) 


  


 


 


Hemoglobin


  


  8.6 g/dL


(12.0-15.5) 


  


 


 


Hematocrit


  


  28.2 %


(36.0-47.0) 


  


 


 


Mean Corpuscular Volume  97 fL ()   


 


Mean Corpuscular Hemoglobin  30 pg (25-35)   


 


Mean Corpuscular Hemoglobin


Concent 


  30 g/dL


(31-37) 


  


 


 


Red Cell Distribution Width


  


  15.6 %


(11.5-14.5) 


  


 


 


Platelet Count


  


  131 x10^3/uL


(140-400) 


  


 


 


Neutrophils (%) (Auto)  54 % (31-73)   


 


Lymphocytes (%) (Auto)  27 % (24-48)   


 


Monocytes (%) (Auto)  17 % (0-9)   


 


Eosinophils (%) (Auto)  2 % (0-3)   


 


Basophils (%) (Auto)  1 % (0-3)   


 


Neutrophils # (Auto)


  


  2.2 x10^3uL


(1.8-7.7) 


  


 


 


Lymphocytes # (Auto)


  


  1.1 x10^3/uL


(1.0-4.8) 


  


 


 


Monocytes # (Auto)


  


  0.7 x10^3/uL


(0.0-1.1) 


  


 


 


Eosinophils # (Auto)


  


  0.1 x10^3/uL


(0.0-0.7) 


  


 


 


Basophils # (Auto)


  


  0.0 x10^3/uL


(0.0-0.2) 


  


 


 


Lactic Acid Level


  


  0.8 mmol/L


(0.4-2.0) 


  


 


 


Sodium Level


  


  


  143 mmol/L


(136-145) 


 


 


Potassium Level


  


  


  4.1 mmol/L


(3.5-5.1) 


 


 


Chloride Level


  


  


  99 mmol/L


() 


 


 


Carbon Dioxide Level


  


  


  52 mmol/L


(21-32) 


 


 


Anion Gap   -8 (6-14)  


 


Blood Urea Nitrogen


  


  


  15 mg/dL


(7-20) 


 


 


Creatinine


  


  


  1.0 mg/dL


(0.6-1.0) 


 


 


Estimated GFR


(Cockcroft-Gault) 


  


  52.7 


  


 


 


BUN/Creatinine Ratio   15 (6-20)  


 


Glucose Level


  


  


  93 mg/dL


(70-99) 


 


 


Calcium Level


  


  


  8.8 mg/dL


(8.5-10.1) 


 


 


Total Bilirubin


  


  


  0.3 mg/dL


(0.2-1.0) 


 


 


Aspartate Amino Transf


(AST/SGOT) 


  


  17 U/L (15-37) 


  


 


 


Alanine Aminotransferase


(ALT/SGPT) 


  


  12 U/L (14-59) 


  


 


 


Alkaline Phosphatase


  


  


  83 U/L


() 


 


 


Total Protein


  


  


  6.9 g/dL


(6.4-8.2) 


 


 


Albumin


  


  


  2.7 g/dL


(3.4-5.0) 


 


 


Albumin/Globulin Ratio   0.6 (1.0-1.7)  


 


Lipase


  


  


  171 U/L


() 


 


 


Test


  12/11/17


17:30 12/11/17


20:00 12/11/17


23:30 12/12/17


02:40


 


Lactic Acid Level


  0.7 mmol/L


(0.4-2.0) 


  


  


 


 


Troponin I Quantitative


  


  < 0.017 ng/mL


(0.000-0.055) 


  < 0.017 ng/mL


(0.000-0.055)


 


O2 Saturation   99 % (92-99)  


 


Arterial Blood pH


  


  


  7.32


(7.35-7.45) 


 


 


Arterial Blood pCO2 at


Patient Temp 


  


  100 mmHg


(35-46) 


 


 


Arterial Blood pO2 at Patient


Temp 


  


  252 mmHg


() 


 


 


Arterial Blood HCO3


  


  


  51 mmol/L


(21-28) 


 


 


Arterial Blood Base Excess


  


  


  21 mmol/L


(-3-3) 


 


 


FiO2   100  


 


White Blood Count


  


  


  


  4.6 x10^3/uL


(4.0-11.0)


 


Red Blood Count


  


  


  


  2.90 x10^6/uL


(3.50-5.40)


 


Hemoglobin


  


  


  


  8.6 g/dL


(12.0-15.5)


 


Hematocrit


  


  


  


  27.9 %


(36.0-47.0)


 


Mean Corpuscular Volume    96 fL () 


 


Mean Corpuscular Hemoglobin    30 pg (25-35) 


 


Mean Corpuscular Hemoglobin


Concent 


  


  


  31 g/dL


(31-37)


 


Red Cell Distribution Width


  


  


  


  15.7 %


(11.5-14.5)


 


Platelet Count


  


  


  


  60 x10^3/uL


(140-400)


 


Neutrophils (%) (Auto)    84 % (31-73) 


 


Lymphocytes (%) (Auto)    12 % (24-48) 


 


Monocytes (%) (Auto)    4 % (0-9) 


 


Eosinophils (%) (Auto)    0 % (0-3) 


 


Basophils (%) (Auto)    0 % (0-3) 


 


Neutrophils # (Auto)


  


  


  


  3.9 x10^3uL


(1.8-7.7)


 


Lymphocytes # (Auto)


  


  


  


  0.5 x10^3/uL


(1.0-4.8)


 


Monocytes # (Auto)


  


  


  


  0.2 x10^3/uL


(0.0-1.1)


 


Eosinophils # (Auto)


  


  


  


  0.0 x10^3/uL


(0.0-0.7)


 


Basophils # (Auto)


  


  


  


  0.0 x10^3/uL


(0.0-0.2)


 


Sodium Level


  


  


  


  142 mmol/L


(136-145)


 


Potassium Level


  


  


  


  4.6 mmol/L


(3.5-5.1)


 


Chloride Level


  


  


  


  99 mmol/L


()


 


Carbon Dioxide Level


  


  


  


  41 mmol/L


(21-32)


 


Anion Gap    2 (6-14) 


 


Blood Urea Nitrogen


  


  


  


  16 mg/dL


(7-20)


 


Creatinine


  


  


  


  0.9 mg/dL


(0.6-1.0)


 


Estimated GFR


(Cockcroft-Gault) 


  


  


  59.5 


 


 


Glucose Level


  


  


  


  97 mg/dL


(70-99)


 


Calcium Level


  


  


  


  8.3 mg/dL


(8.5-10.1)


 


Test


  12/12/17


07:50 12/13/17


02:50 12/13/17


08:05 


 


 


O2 Saturation 94 % (92-99)    


 


Arterial Blood pH


  7.43


(7.35-7.45) 


  


  


 


 


Arterial Blood pCO2 at


Patient Temp 59 mmHg


(35-46) 


  


  


 


 


Arterial Blood pO2 at Patient


Temp 71 mmHg


() 


  


  


 


 


Arterial Blood HCO3


  38 mmol/L


(21-28) 


  


  


 


 


Arterial Blood Base Excess


  12 mmol/L


(-3-3) 


  


  


 


 


FiO2 50    


 


Sodium Level


  


  140 mmol/L


(136-145) 


  


 


 


Potassium Level


  


  4.6 mmol/L


(3.5-5.1) 


  


 


 


Chloride Level


  


  99 mmol/L


() 


  


 


 


Carbon Dioxide Level


  


  40 mmol/L


(21-32) 


  


 


 


Anion Gap  1 (6-14)   


 


Blood Urea Nitrogen


  


  24 mg/dL


(7-20) 


  


 


 


Creatinine


  


  1.2 mg/dL


(0.6-1.0) 


  


 


 


Estimated GFR


(Cockcroft-Gault) 


  42.7 


  


  


 


 


Glucose Level


  


  130 mg/dL


(70-99) 


  


 


 


Calcium Level


  


  8.5 mg/dL


(8.5-10.1) 


  


 


 


White Blood Count


  


  


  5.8 x10^3/uL


(4.0-11.0) 


 


 


Red Blood Count


  


  


  2.69 x10^6/uL


(3.50-5.40) 


 


 


Hemoglobin


  


  


  7.8 g/dL


(12.0-15.5) 


 


 


Hematocrit


  


  


  25.9 %


(36.0-47.0) 


 


 


Mean Corpuscular Volume   96 fL ()  


 


Mean Corpuscular Hemoglobin   29 pg (25-35)  


 


Mean Corpuscular Hemoglobin


Concent 


  


  30 g/dL


(31-37) 


 


 


Red Cell Distribution Width


  


  


  16.2 %


(11.5-14.5) 


 


 


Platelet Count


  


  


  71 x10^3/uL


(140-400) 


 


 


Neutrophils (%) (Auto)   86 % (31-73)  


 


Lymphocytes (%) (Auto)   9 % (24-48)  


 


Monocytes (%) (Auto)   5 % (0-9)  


 


Eosinophils (%) (Auto)   0 % (0-3)  


 


Basophils (%) (Auto)   0 % (0-3)  


 


Neutrophils # (Auto)


  


  


  5.0 x10^3uL


(1.8-7.7) 


 


 


Lymphocytes # (Auto)


  


  


  0.5 x10^3/uL


(1.0-4.8) 


 


 


Monocytes # (Auto)


  


  


  0.3 x10^3/uL


(0.0-1.1) 


 


 


Eosinophils # (Auto)


  


  


  0.0 x10^3/uL


(0.0-0.7) 


 


 


Basophils # (Auto)


  


  


  0.0 x10^3/uL


(0.0-0.2) 


 








Laboratory Tests








Test


  12/13/17


02:50 12/13/17


08:05


 


Sodium Level


  140 mmol/L


(136-145) 


 


 


Potassium Level


  4.6 mmol/L


(3.5-5.1) 


 


 


Chloride Level


  99 mmol/L


() 


 


 


Carbon Dioxide Level


  40 mmol/L


(21-32) 


 


 


Anion Gap 1 (6-14)  


 


Blood Urea Nitrogen


  24 mg/dL


(7-20) 


 


 


Creatinine


  1.2 mg/dL


(0.6-1.0) 


 


 


Estimated GFR


(Cockcroft-Gault) 42.7 


  


 


 


Glucose Level


  130 mg/dL


(70-99) 


 


 


Calcium Level


  8.5 mg/dL


(8.5-10.1) 


 


 


White Blood Count


  


  5.8 x10^3/uL


(4.0-11.0)


 


Red Blood Count


  


  2.69 x10^6/uL


(3.50-5.40)


 


Hemoglobin


  


  7.8 g/dL


(12.0-15.5)


 


Hematocrit


  


  25.9 %


(36.0-47.0)


 


Mean Corpuscular Volume  96 fL () 


 


Mean Corpuscular Hemoglobin  29 pg (25-35) 


 


Mean Corpuscular Hemoglobin


Concent 


  30 g/dL


(31-37)


 


Red Cell Distribution Width


  


  16.2 %


(11.5-14.5)


 


Platelet Count


  


  71 x10^3/uL


(140-400)


 


Neutrophils (%) (Auto)  86 % (31-73) 


 


Lymphocytes (%) (Auto)  9 % (24-48) 


 


Monocytes (%) (Auto)  5 % (0-9) 


 


Eosinophils (%) (Auto)  0 % (0-3) 


 


Basophils (%) (Auto)  0 % (0-3) 


 


Neutrophils # (Auto)


  


  5.0 x10^3uL


(1.8-7.7)


 


Lymphocytes # (Auto)


  


  0.5 x10^3/uL


(1.0-4.8)


 


Monocytes # (Auto)


  


  0.3 x10^3/uL


(0.0-1.1)


 


Eosinophils # (Auto)


  


  0.0 x10^3/uL


(0.0-0.7)


 


Basophils # (Auto)


  


  0.0 x10^3/uL


(0.0-0.2)








Medications





Active Scripts








 Medications  Dose


 Route/Sig


 Max Daily Dose Days Date Category


 


 Macrobid 100 Mg


 Capsule


  (Nitrofurantoin


 Monohyd/M-Cryst)


 100 Mg Capsule  1 Cap


 PO BID


    11/15/17 Rx


 


 Doxycycline


 Hyclate 100 Mg


 Tablet  100 Mg


 PO BID


   7 10/20/17 Rx


 


 Zofran Odt


  (Ondansetron) 4


 Mg Tab.rapdis  1 Tab


 SL Q8HRS


    3/5/17 Rx


 


 Duoneb 0.5-3(2.5)


 Mg/3 Ml


  (Albuterol/Ipratropium)


 3 Ml Ampul.neb  3 Ml


 NEB QID


    3/1/17 Reported


 


 Vitamin B-12


  (Cyanocobalamin


  (Vitamin B-12))


 1,000 Mcg Tablet  1 Tab


 PO DAILY


    1/19/17 Rx


 


 Senna


  (Sennosides) 8.6


 Mg Tablet  8.6 Mg


 PO DAILY


    9/14/16 Rx


 


 Docusate Sodium


 100 Mg Capsule  1 Cap


 PO DAILY


    9/14/16 Rx


 


 Trazodone Hcl 100


 Mg Tablet  1 Tab


 PO QHS


    9/1/16 Reported


 


 Vitamin D


  (Cholecalciferol


  (Vitamin D3))


 1,000 Unit Capsule  1 Cap


 PO DAILY


    9/1/16 Reported


 


 Percocet 


 Mg Tablet


  (Oxycodone/Acetaminophen)


 1 Each Tablet  1 Tab


 PO TID PRN


    6/8/16 Rx


 


 Cranberry


  (Cranberry Fruit)


 400 Mg Tablet  4,200 Mg


 PO DAILY


    6/1/16 Reported


 


 Levothyroxine


 Sodium 88 Mcg


 Tablet  1 Tab


 PO DAILY


    5/31/16 Reported


 


 Alprazolam 0.25


 Mg Tablet  1 Tab


 PO PRN TID PRN


    5/31/16 Reported


 


 Digoxin 125 Mcg


 Tablet  1 Tab


 PO DAILY


    6/1/15 Reported


 


 Escitalopram


 Oxalate 10 Mg


 Tablet  20 Mg


 PO DAILY


    6/8/14 Reported











Impression


.


1.  Acute on chronic hypercapnic and hypoxic respiratory failure secondary to


combination of acute exacerbation of chronic obstructive pulmonary disease, and


use of narcotics.


2.  Acute toxic encephalopathy secondary to use of narcotics.  She is on


Percocet t.i.d. p.r.n.


3.  History of lung cancer with excellent response to prior treatment with no


residual mass seen in the right chest.


4.  Anemia.


5.  Thrombocytopenia could be related to inflammatory etiology.  Will need to be


monitored closely.  She is not on any Lovenox.


6.  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


hypercapnic respiratory failure.





Plan


.





1.  We will continue the nasal canula during the day and BiPAP at bedtime for a


few nights.


2.  Continue bronchodilators.


3.  Continue steroids.


4.  Empiric antibiotics for now.


5.  Steroid taper.


6.  Minimize the use of benzodiazepine and narcotics, and the medication doses


should be adjusted for home medication at discharge.


7.  Monitor platelets


8.  Discussed with RN and RT and Dr. Couch as well. transfer to floor











EDDIE ANDRE MD Dec 13, 2017 10:35

## 2017-12-14 VITALS — SYSTOLIC BLOOD PRESSURE: 120 MMHG | DIASTOLIC BLOOD PRESSURE: 57 MMHG

## 2017-12-14 VITALS — DIASTOLIC BLOOD PRESSURE: 54 MMHG | SYSTOLIC BLOOD PRESSURE: 91 MMHG

## 2017-12-14 VITALS — SYSTOLIC BLOOD PRESSURE: 111 MMHG | DIASTOLIC BLOOD PRESSURE: 53 MMHG

## 2017-12-14 VITALS — SYSTOLIC BLOOD PRESSURE: 123 MMHG | DIASTOLIC BLOOD PRESSURE: 58 MMHG

## 2017-12-14 VITALS — SYSTOLIC BLOOD PRESSURE: 136 MMHG | DIASTOLIC BLOOD PRESSURE: 64 MMHG

## 2017-12-14 VITALS — SYSTOLIC BLOOD PRESSURE: 105 MMHG | DIASTOLIC BLOOD PRESSURE: 38 MMHG

## 2017-12-14 LAB
ANISOCYTOSIS BLD QL SMEAR: SLIGHT
BASOPHILS # BLD AUTO: 0 X10^3/UL (ref 0–0.2)
BASOPHILS NFR BLD: 1 % (ref 0–3)
EOSINOPHIL NFR BLD: 0 % (ref 0–3)
ERYTHROCYTE [DISTWIDTH] IN BLOOD BY AUTOMATED COUNT: 15.7 % (ref 11.5–14.5)
HCT VFR BLD CALC: 25.5 % (ref 36–47)
HGB BLD-MCNC: 7.8 G/DL (ref 12–15.5)
LYMPHOCYTES # BLD: 0.5 X10^3/UL (ref 1–4.8)
LYMPHOCYTES NFR BLD AUTO: 8 % (ref 24–48)
MCH RBC QN AUTO: 30 PG (ref 25–35)
MCHC RBC AUTO-ENTMCNC: 31 G/DL (ref 31–37)
MCV RBC AUTO: 97 FL (ref 79–100)
MONOCYTES NFR BLD: 4 % (ref 0–9)
NEUTROPHILS NFR BLD AUTO: 87 % (ref 31–73)
PLATELET # BLD AUTO: 119 X10^3/UL (ref 140–400)
PLATELET # BLD EST: ADEQUATE 10*3/UL
POLYCHROMASIA BLD QL SMEAR: SLIGHT
RBC # BLD AUTO: 2.64 X10^6/UL (ref 3.5–5.4)
WBC # BLD AUTO: 6.8 X10^3/UL (ref 4–11)

## 2017-12-14 RX ADMIN — ONDANSETRON SCH MG: 4 TABLET, ORALLY DISINTEGRATING ORAL at 05:36

## 2017-12-14 RX ADMIN — Medication SCH CAP: at 22:01

## 2017-12-14 RX ADMIN — SENNOSIDES A AND B SCH MG: 8.6 TABLET, FILM COATED ORAL at 08:15

## 2017-12-14 RX ADMIN — IPRATROPIUM BROMIDE AND ALBUTEROL SULFATE SCH ML: .5; 3 SOLUTION RESPIRATORY (INHALATION) at 07:28

## 2017-12-14 RX ADMIN — CITALOPRAM HYDROBROMIDE SCH MG: 20 TABLET ORAL at 08:15

## 2017-12-14 RX ADMIN — METHYLPREDNISOLONE SODIUM SUCCINATE SCH MG: 125 INJECTION, POWDER, FOR SOLUTION INTRAMUSCULAR; INTRAVENOUS at 05:36

## 2017-12-14 RX ADMIN — BACITRACIN SCH MLS/HR: 5000 INJECTION, POWDER, FOR SOLUTION INTRAMUSCULAR at 01:54

## 2017-12-14 RX ADMIN — Medication SCH CAP: at 08:15

## 2017-12-14 RX ADMIN — ONDANSETRON SCH MG: 4 TABLET, ORALLY DISINTEGRATING ORAL at 15:57

## 2017-12-14 RX ADMIN — ONDANSETRON SCH MG: 4 TABLET, ORALLY DISINTEGRATING ORAL at 22:00

## 2017-12-14 RX ADMIN — VITAMIN D, TAB 1000IU (100/BT) SCH UNIT: 25 TAB at 08:15

## 2017-12-14 RX ADMIN — IPRATROPIUM BROMIDE AND ALBUTEROL SULFATE SCH ML: .5; 3 SOLUTION RESPIRATORY (INHALATION) at 20:09

## 2017-12-14 RX ADMIN — IPRATROPIUM BROMIDE AND ALBUTEROL SULFATE SCH ML: .5; 3 SOLUTION RESPIRATORY (INHALATION) at 16:40

## 2017-12-14 RX ADMIN — DIGOXIN SCH MCG: 125 TABLET ORAL at 08:14

## 2017-12-14 RX ADMIN — DOCUSATE SODIUM SCH MG: 100 CAPSULE, LIQUID FILLED ORAL at 08:15

## 2017-12-14 RX ADMIN — IPRATROPIUM BROMIDE AND ALBUTEROL SULFATE SCH ML: .5; 3 SOLUTION RESPIRATORY (INHALATION) at 11:12

## 2017-12-14 RX ADMIN — TRAZODONE HYDROCHLORIDE SCH MG: 100 TABLET ORAL at 22:00

## 2017-12-14 RX ADMIN — LEVOTHYROXINE SODIUM SCH MCG: 88 TABLET ORAL at 08:14

## 2017-12-14 RX ADMIN — CYANOCOBALAMIN TAB 1000 MCG SCH MCG: 1000 TAB at 08:15

## 2017-12-14 NOTE — PDOC
PULMONARY PROGRESS NOTES


Subjective


feels much better


Vitals





Vital Signs








  Date Time  Temp Pulse Resp B/P (MAP) Pulse Ox O2 Delivery O2 Flow Rate FiO2


 


12/14/17 08:14  73  136/64    


 


12/14/17 07:45      Nasal Cannula 4.5 


 


12/14/17 07:00 97.8  18  90   





 97.8       








General:  Alert, No acute distress


HEENT:  Other


Lungs:  Other (decrease bs)


Cardiovascular:  S1, S2


Abdomen:  Soft, Non-tender


Neuro Exam:  Alert


Extremities:  No Edema


Skin:  Warm


Labs





Laboratory Tests








Test


  12/13/17


02:50 12/13/17


08:05 12/14/17


04:05


 


Sodium Level


  140 mmol/L


(136-145) 


  


 


 


Potassium Level


  4.6 mmol/L


(3.5-5.1) 


  


 


 


Chloride Level


  99 mmol/L


() 


  


 


 


Carbon Dioxide Level


  40 mmol/L


(21-32) 


  


 


 


Anion Gap 1 (6-14)   


 


Blood Urea Nitrogen


  24 mg/dL


(7-20) 


  


 


 


Creatinine


  1.2 mg/dL


(0.6-1.0) 


  


 


 


Estimated GFR


(Cockcroft-Gault) 42.7 


  


  


 


 


Glucose Level


  130 mg/dL


(70-99) 


  


 


 


Calcium Level


  8.5 mg/dL


(8.5-10.1) 


  


 


 


White Blood Count


  


  5.8 x10^3/uL


(4.0-11.0) 6.8 x10^3/uL


(4.0-11.0)


 


Red Blood Count


  


  2.69 x10^6/uL


(3.50-5.40) 2.64 x10^6/uL


(3.50-5.40)


 


Hemoglobin


  


  7.8 g/dL


(12.0-15.5) 7.8 g/dL


(12.0-15.5)


 


Hematocrit


  


  25.9 %


(36.0-47.0) 25.5 %


(36.0-47.0)


 


Mean Corpuscular Volume  96 fL ()  97 fL () 


 


Mean Corpuscular Hemoglobin  29 pg (25-35)  30 pg (25-35) 


 


Mean Corpuscular Hemoglobin


Concent 


  30 g/dL


(31-37) 31 g/dL


(31-37)


 


Red Cell Distribution Width


  


  16.2 %


(11.5-14.5) 15.7 %


(11.5-14.5)


 


Platelet Count


  


  71 x10^3/uL


(140-400) 119 x10^3/uL


(140-400)


 


Neutrophils (%) (Auto)  86 % (31-73)  87 % (31-73) 


 


Lymphocytes (%) (Auto)  9 % (24-48)  8 % (24-48) 


 


Monocytes (%) (Auto)  5 % (0-9)  4 % (0-9) 


 


Eosinophils (%) (Auto)  0 % (0-3)  0 % (0-3) 


 


Basophils (%) (Auto)  0 % (0-3)  1 % (0-3) 


 


Neutrophils # (Auto)


  


  5.0 x10^3uL


(1.8-7.7) 5.9 x10^3uL


(1.8-7.7)


 


Lymphocytes # (Auto)


  


  0.5 x10^3/uL


(1.0-4.8) 0.5 x10^3/uL


(1.0-4.8)


 


Monocytes # (Auto)


  


  0.3 x10^3/uL


(0.0-1.1) 0.3 x10^3/uL


(0.0-1.1)


 


Eosinophils # (Auto)


  


  0.0 x10^3/uL


(0.0-0.7) 0.0 x10^3/uL


(0.0-0.7)


 


Basophils # (Auto)


  


  0.0 x10^3/uL


(0.0-0.2) 0.0 x10^3/uL


(0.0-0.2)


 


Platelet Estimate


  


  Adequate


(ADEQUATE) Adequate


(ADEQUATE)


 


Platelet Clumps, EDTA  Present  Present 


 


Large Platelets  Present  Occ 


 


Giant Platelets  Present  


 


Segmented Neutrophils %   83 % (35-66) 


 


Lymphocytes %   11 % (24-48) 


 


Monocytes %   6 % (0-10) 


 


Polychromasia   Slight 


 


Anisocytosis   Slight 








Laboratory Tests








Test


  12/14/17


04:05


 


White Blood Count


  6.8 x10^3/uL


(4.0-11.0)


 


Red Blood Count


  2.64 x10^6/uL


(3.50-5.40)


 


Hemoglobin


  7.8 g/dL


(12.0-15.5)


 


Hematocrit


  25.5 %


(36.0-47.0)


 


Mean Corpuscular Volume 97 fL () 


 


Mean Corpuscular Hemoglobin 30 pg (25-35) 


 


Mean Corpuscular Hemoglobin


Concent 31 g/dL


(31-37)


 


Red Cell Distribution Width


  15.7 %


(11.5-14.5)


 


Platelet Count


  119 x10^3/uL


(140-400)


 


Neutrophils (%) (Auto) 87 % (31-73) 


 


Lymphocytes (%) (Auto) 8 % (24-48) 


 


Monocytes (%) (Auto) 4 % (0-9) 


 


Eosinophils (%) (Auto) 0 % (0-3) 


 


Basophils (%) (Auto) 1 % (0-3) 


 


Neutrophils # (Auto)


  5.9 x10^3uL


(1.8-7.7)


 


Lymphocytes # (Auto)


  0.5 x10^3/uL


(1.0-4.8)


 


Monocytes # (Auto)


  0.3 x10^3/uL


(0.0-1.1)


 


Eosinophils # (Auto)


  0.0 x10^3/uL


(0.0-0.7)


 


Basophils # (Auto)


  0.0 x10^3/uL


(0.0-0.2)


 


Segmented Neutrophils % 83 % (35-66) 


 


Lymphocytes % 11 % (24-48) 


 


Monocytes % 6 % (0-10) 


 


Platelet Estimate


  Adequate


(ADEQUATE)


 


Platelet Clumps, EDTA Present 


 


Large Platelets Occ 


 


Polychromasia Slight 


 


Anisocytosis Slight 








Medications





Active Scripts








 Medications  Dose


 Route/Sig


 Max Daily Dose Days Date Category


 


 Macrobid 100 Mg


 Capsule


  (Nitrofurantoin


 Monohyd/M-Cryst)


 100 Mg Capsule  1 Cap


 PO BID


    11/15/17 Rx


 


 Doxycycline


 Hyclate 100 Mg


 Tablet  100 Mg


 PO BID


   7 10/20/17 Rx


 


 Zofran Odt


  (Ondansetron) 4


 Mg Tab.rapdis  1 Tab


 SL Q8HRS


    3/5/17 Rx


 


 Duoneb 0.5-3(2.5)


 Mg/3 Ml


  (Albuterol/Ipratropium)


 3 Ml Ampul.neb  3 Ml


 NEB QID


    3/1/17 Reported


 


 Vitamin B-12


  (Cyanocobalamin


  (Vitamin B-12))


 1,000 Mcg Tablet  1 Tab


 PO DAILY


    1/19/17 Rx


 


 Senna


  (Sennosides) 8.6


 Mg Tablet  8.6 Mg


 PO DAILY


    9/14/16 Rx


 


 Docusate Sodium


 100 Mg Capsule  1 Cap


 PO DAILY


    9/14/16 Rx


 


 Trazodone Hcl 100


 Mg Tablet  1 Tab


 PO QHS


    9/1/16 Reported


 


 Vitamin D


  (Cholecalciferol


  (Vitamin D3))


 1,000 Unit Capsule  1 Cap


 PO DAILY


    9/1/16 Reported


 


 Percocet 


 Mg Tablet


  (Oxycodone/Acetaminophen)


 1 Each Tablet  1 Tab


 PO TID PRN


    6/8/16 Rx


 


 Cranberry


  (Cranberry Fruit)


 400 Mg Tablet  4,200 Mg


 PO DAILY


    6/1/16 Reported


 


 Levothyroxine


 Sodium 88 Mcg


 Tablet  1 Tab


 PO DAILY


    5/31/16 Reported


 


 Alprazolam 0.25


 Mg Tablet  1 Tab


 PO PRN TID PRN


    5/31/16 Reported


 


 Digoxin 125 Mcg


 Tablet  1 Tab


 PO DAILY


    6/1/15 Reported


 


 Escitalopram


 Oxalate 10 Mg


 Tablet  20 Mg


 PO DAILY


    6/8/14 Reported











Impression


.


1.  Acute on chronic hypercapnic and hypoxic respiratory failure secondary to


combination of acute exacerbation of chronic obstructive pulmonary disease, and


use of narcotics.


2.  Acute toxic encephalopathy secondary to use of narcotics.  She is on


Percocet t.i.d. p.r.n.


3.  History of lung cancer with excellent response to prior treatment with no


residual mass seen in the right chest.


4.  Anemia.


5.  Thrombocytopenia could be related to inflammatory etiology.  Will need to be


monitored closely.  She is not on any Lovenox.


6.  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


hypercapnic respiratory failure.





Plan


.





1.  We will continue the nasal canula . dc bipap


2.  Continue bronchodilators.


3.  Continue steroids with taper


4.  Empiric antibiotics 


5.  Steroid taper.


6.  would recommend to [place] home narcotics.


7.  Monitor platelets


8.  Discussed with RN 


     ok with [place] home











EDDIE ANDRE MD Dec 14, 2017 09:44

## 2017-12-14 NOTE — PN
DATE:  



PATIENT LOCATION:  550.



SUBJECTIVE:  The patient is sleepy, easily awakable.



OBJECTIVE:

VITAL SIGNS:  Afebrile, vitals stable.  The patient is on oxygen nasal cannula.

HEENT:  Head is atraumatic.  Pupils equal.

NECK:  Supple.

CHEST:  Symmetrical.

CARDIOVASCULAR:  S1, S2.

LUNGS:  No wheezing.

ABDOMEN:  Soft.

EXTREMITIES:  No edema.



FINAL IMPRESSION:

1.  Chronic obstructive pulmonary disease with acute exacerbation.

2.  Hypercapnic respiratory failure, resolved.

3.  Lung cancer 5 years ago, had radiation and chemo.

4.  Chronic osteoporosis, compression fractures.



PLAN:  At this time, I spoke with the Pulmonology today and changed to oral

medications, oral prednisone and oral antibiotic.  PT, OT.  Plan to discharge

home tomorrow.  The patient is up-to-date on flu and pneumonia shots.  She does

not smoke.  She is on home oxygen 5 liters at home.  The patient is a DNR.



PROGNOSIS:  Guarded.

 



______________________________

SUMMER TABOR MD



DR:  RAGINI/aidan  JOB#:  [id number] / [id number]

DD:  12/14/2017 10:12  DT:  12/14/2017 10:30

## 2017-12-15 VITALS — SYSTOLIC BLOOD PRESSURE: 136 MMHG | DIASTOLIC BLOOD PRESSURE: 72 MMHG

## 2017-12-15 VITALS — DIASTOLIC BLOOD PRESSURE: 63 MMHG | SYSTOLIC BLOOD PRESSURE: 128 MMHG

## 2017-12-15 VITALS — SYSTOLIC BLOOD PRESSURE: 136 MMHG | DIASTOLIC BLOOD PRESSURE: 55 MMHG

## 2017-12-15 RX ADMIN — IPRATROPIUM BROMIDE AND ALBUTEROL SULFATE SCH ML: .5; 3 SOLUTION RESPIRATORY (INHALATION) at 07:48

## 2017-12-15 RX ADMIN — VITAMIN D, TAB 1000IU (100/BT) SCH UNIT: 25 TAB at 08:09

## 2017-12-15 RX ADMIN — Medication SCH CAP: at 08:09

## 2017-12-15 RX ADMIN — ONDANSETRON SCH MG: 4 TABLET, ORALLY DISINTEGRATING ORAL at 06:00

## 2017-12-15 RX ADMIN — DIGOXIN SCH MCG: 125 TABLET ORAL at 08:09

## 2017-12-15 RX ADMIN — DOCUSATE SODIUM SCH MG: 100 CAPSULE, LIQUID FILLED ORAL at 08:09

## 2017-12-15 RX ADMIN — CYANOCOBALAMIN TAB 1000 MCG SCH MCG: 1000 TAB at 08:10

## 2017-12-15 RX ADMIN — SENNOSIDES A AND B SCH MG: 8.6 TABLET, FILM COATED ORAL at 08:09

## 2017-12-15 RX ADMIN — CITALOPRAM HYDROBROMIDE SCH MG: 20 TABLET ORAL at 08:10

## 2017-12-15 RX ADMIN — LEVOTHYROXINE SODIUM SCH MCG: 88 TABLET ORAL at 08:10

## 2017-12-15 RX ADMIN — IPRATROPIUM BROMIDE AND ALBUTEROL SULFATE SCH ML: .5; 3 SOLUTION RESPIRATORY (INHALATION) at 12:02

## 2017-12-15 NOTE — PDOC
PROGRESS NOTES


Subjective


Subjective


feels better ready to go home





Objective


Objective





Vital Signs








  Date Time  Temp Pulse Resp B/P (MAP) Pulse Ox O2 Delivery O2 Flow Rate FiO2


 


12/15/17 08:09  85  136/85    


 


12/15/17 07:48     88 Nasal Cannula 5.0 


 


12/15/17 07:00 97.9  18     





 97.9       














Intake and Output 


 


 12/15/17





 07:00


 


Intake Total 1700 ml


 


Output Total 750 ml


 


Balance 950 ml


 


 


 


Intake Oral 1700 ml


 


Output Urine Total 750 ml


 


# Voids 3











Physical Exam


Abdomen:  Normal bowel sounds, Soft


Heart:  Regular rate, Normal S1


Extremities:  No clubbing


General:  Alert


HEENT:  Atraumatic


Lungs:  Clear to auscultation


MUSCULOSKELETAL:  No swelling


Neuro:  Normal speech


Psych/Mental Status:  Mood NL


Skin:  No significant lesion





Diagnosis


Problem List


Problems


Medical Problems:


(1) COPD (chronic obstructive pulmonary disease)


Status: Acute  





(2) Respiratory distress


Status: Acute  











Assessment


Assessment


Problems


Medical Problems:


(1) COPD (chronic obstructive pulmonary disease)


Status: Acute  





(2) Respiratory distress


Status: Acute  





FINAL IMPRESSION:


1.  Hypercapnic acute respiratory failure.


2.  Chronic obstructive pulmonary disease with hypoxia, on 5 liters oxygen.


3.  History of lung cancer, had radiation and chemo 5 years ago.


4.  Anxiety.


5.  Depression.


6.  Hiatal hernia.


7.  Chronic pain syndrome.





PLAN: d/c home today


po prednisone


co2 corrected.


on oxygen nasal canula.


she has home care giver


Problems:  





Plan


Plan of Care


Problems


Medical Problems:


(1) COPD (chronic obstructive pulmonary disease)


Status: Acute  





(2) Respiratory distress


Status: Acute  








Comment


Review of Relevant


I have reviewed the following items shan (where applicable) has been applied.


Labs





Microbiology


12/11/17 Blood Culture - Preliminary, Resulted


           NO GROWTH AFTER 3 DAYS


Medications





Current Medications


Prednisone (Prednisone) 5 mg DAILY PO ;  Start 12/21/17 at 09:00;  Stop 12/22/ 17 at 09:01


Prednisone (Prednisone) 10 mg DAILY PO ;  Start 12/19/17 at 09:00;  Stop 12/20/ 17 at 09:01


Prednisone (Prednisone) 20 mg DAILY PO ;  Start 12/17/17 at 09:00;  Stop 12/18/ 17 at 09:01


Prednisone (Prednisone) 30 mg DAILY PO  Last administered on 12/15/17at 08:10;  

Start 12/15/17 at 09:00;  Stop 12/16/17 at 09:01


Vitals/I & O





Vital Sign - Last 24 Hours








 12/14/17 12/14/17 12/14/17 12/14/17





 10:46 11:12 15:00 16:42


 


Temp 97.8  97.7 





 97.8  97.7 


 


Pulse 82  85 


 


Resp 18  18 


 


B/P (MAP) 123/58 (79)  105/38 (60) 


 


Pulse Ox 92 93 89 80


 


O2 Delivery Nasal Cannula Nasal Cannula Nasal Cannula Nasal Cannula


 


O2 Flow Rate 5.0 5.0 6.5 5.0


 


    





    





 12/14/17 12/14/17 12/14/17 12/14/17





 19:00 19:45 20:12 23:00


 


Temp 98.7   97.3





 98.7   97.3


 


Pulse 131   88


 


Resp 32   22


 


B/P (MAP) 111/53 (72)   91/54 (66)


 


Pulse Ox 95  85 90


 


O2 Delivery  Nasal Cannula Nasal Cannula 


 


O2 Flow Rate  6.0 10.0 


 


    





    





 12/15/17 12/15/17 12/15/17 12/15/17





 03:00 07:00 07:45 07:48


 


Temp 98.5 97.9  





 98.5 97.9  


 


Pulse 85 81  


 


Resp 20 18  


 


B/P (MAP) 128/63 (84) 136/55 (82)  


 


Pulse Ox 91 90  88


 


O2 Delivery  Nasal Cannula Nasal Cannula Nasal Cannula


 


O2 Flow Rate  6.0 5.5 5.0


 


    





    





 12/15/17   





 08:09   


 


Pulse 85   


 


B/P (MAP) 136/85   














Intake and Output   


 


 12/14/17 12/14/17 12/15/17





 15:00 23:00 07:00


 


Intake Total 600 ml 1100 ml 


 


Output Total 200 ml  550 ml


 


Balance 400 ml 1100 ml -550 ml











Nutrition Consultation


Dietary Evaluation:


Recommendations by RD:  Increase Calorie Intake


Comments:  


REC Mechanical soft cardiac diet


Expected Outcomes/Goals:  


PO intake to meet > 75% est needs





Malnutrition Findings:


Food and Nutrition Intake (Mod:  <75% est energy req 7days


Weight Status:  Overweight











SUMMER TABOR MD Dec 15, 2017 10:07

## 2017-12-15 NOTE — PDOC
PULMONARY PROGRESS NOTES


Subjective


feels much better


Vitals





Vital Signs








  Date Time  Temp Pulse Resp B/P (MAP) Pulse Ox O2 Delivery O2 Flow Rate FiO2


 


12/15/17 10:47 97.9 79 18 136/72 (93) 92 Nasal Cannula 6.0 





 97.9       








General:  Alert, No acute distress


HEENT:  Other


Lungs:  Other (decrease bs)


Cardiovascular:  S1, S2


Abdomen:  Soft, Non-tender


Neuro Exam:  Alert


Extremities:  No Edema


Skin:  Warm


Labs





Laboratory Tests








Test


  12/14/17


04:05


 


White Blood Count


  6.8 x10^3/uL


(4.0-11.0)


 


Red Blood Count


  2.64 x10^6/uL


(3.50-5.40)


 


Hemoglobin


  7.8 g/dL


(12.0-15.5)


 


Hematocrit


  25.5 %


(36.0-47.0)


 


Mean Corpuscular Volume 97 fL () 


 


Mean Corpuscular Hemoglobin 30 pg (25-35) 


 


Mean Corpuscular Hemoglobin


Concent 31 g/dL


(31-37)


 


Red Cell Distribution Width


  15.7 %


(11.5-14.5)


 


Platelet Count


  119 x10^3/uL


(140-400)


 


Neutrophils (%) (Auto) 87 % (31-73) 


 


Lymphocytes (%) (Auto) 8 % (24-48) 


 


Monocytes (%) (Auto) 4 % (0-9) 


 


Eosinophils (%) (Auto) 0 % (0-3) 


 


Basophils (%) (Auto) 1 % (0-3) 


 


Neutrophils # (Auto)


  5.9 x10^3uL


(1.8-7.7)


 


Lymphocytes # (Auto)


  0.5 x10^3/uL


(1.0-4.8)


 


Monocytes # (Auto)


  0.3 x10^3/uL


(0.0-1.1)


 


Eosinophils # (Auto)


  0.0 x10^3/uL


(0.0-0.7)


 


Basophils # (Auto)


  0.0 x10^3/uL


(0.0-0.2)


 


Segmented Neutrophils % 83 % (35-66) 


 


Lymphocytes % 11 % (24-48) 


 


Monocytes % 6 % (0-10) 


 


Platelet Estimate


  Adequate


(ADEQUATE)


 


Platelet Clumps, EDTA Present 


 


Large Platelets Occ 


 


Polychromasia Slight 


 


Anisocytosis Slight 








Medications





Active Scripts








 Medications  Dose


 Route/Sig


 Max Daily Dose Days Date Category


 


 Macrobid 100 Mg


 Capsule


  (Nitrofurantoin


 Monohyd/M-Cryst)


 100 Mg Capsule  1 Cap


 PO BID


    11/15/17 Rx


 


 Doxycycline


 Hyclate 100 Mg


 Tablet  100 Mg


 PO BID


   7 10/20/17 Rx


 


 Zofran Odt


  (Ondansetron) 4


 Mg Tab.rapdis  1 Tab


 SL Q8HRS


    3/5/17 Rx


 


 Duoneb 0.5-3(2.5)


 Mg/3 Ml


  (Albuterol/Ipratropium)


 3 Ml Ampul.neb  3 Ml


 NEB QID


    3/1/17 Reported


 


 Vitamin B-12


  (Cyanocobalamin


  (Vitamin B-12))


 1,000 Mcg Tablet  1 Tab


 PO DAILY


    1/19/17 Rx


 


 Senna


  (Sennosides) 8.6


 Mg Tablet  8.6 Mg


 PO DAILY


    9/14/16 Rx


 


 Docusate Sodium


 100 Mg Capsule  1 Cap


 PO DAILY


    9/14/16 Rx


 


 Trazodone Hcl 100


 Mg Tablet  1 Tab


 PO QHS


    9/1/16 Reported


 


 Vitamin D


  (Cholecalciferol


  (Vitamin D3))


 1,000 Unit Capsule  1 Cap


 PO DAILY


    9/1/16 Reported


 


 Percocet 


 Mg Tablet


  (Oxycodone/Acetaminophen)


 1 Each Tablet  1 Tab


 PO TID PRN


    6/8/16 Rx


 


 Cranberry


  (Cranberry Fruit)


 400 Mg Tablet  4,200 Mg


 PO DAILY


    6/1/16 Reported


 


 Levothyroxine


 Sodium 88 Mcg


 Tablet  1 Tab


 PO DAILY


    5/31/16 Reported


 


 Alprazolam 0.25


 Mg Tablet  1 Tab


 PO PRN TID PRN


    5/31/16 Reported


 


 Digoxin 125 Mcg


 Tablet  1 Tab


 PO DAILY


    6/1/15 Reported


 


 Escitalopram


 Oxalate 10 Mg


 Tablet  20 Mg


 PO DAILY


    6/8/14 Reported











Impression


.


1.  Acute on chronic hypercapnic and hypoxic respiratory failure secondary to


combination of acute exacerbation of chronic obstructive pulmonary disease, and


use of narcotics.


2.  Acute toxic encephalopathy secondary to use of narcotics.  She is on


Percocet t.i.d. p.r.n.


3.  History of lung cancer with excellent response to prior treatment with no


residual mass seen in the right chest.


4.  Anemia.


5.  Thrombocytopenia could be related to inflammatory etiology.  Will need to be


monitored closely.  She is not on any Lovenox.


6.  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


hypercapnic respiratory failure.





Plan


.





1.  We will continue the nasal canula . off bipap


2.  Continue bronchodilators.


3.  Continue steroids with taper


4.  Empiric antibiotics 


5.  Steroid taper.


6.  would recommend to dc home narcotics.


7.  Monitor platelets, improved


8.  Discussed with RN 


     ok with dc home











EDDIE ANDRE MD Dec 15, 2017 10:54

## 2017-12-15 NOTE — DS
DATE OF DISCHARGE:  12/15/2017



REASON FOR ADMISSION TO THE HOSPITAL:  Acute respiratory failure, hypercapnic

respiratory failure.



CONSULTATIONS:  Dr. Briscoe.



PROCEDURES DONE:  CT chest.



HOSPITAL COURSE:  The patient is an 85-year-old female with history of lung

cancer, COPD, chronic oxygen at 5 liters.  She came with respiratory distress,

short of breath.  She has had a CO2 of 100, pH 7.3.  The patient was put on

BiPAP, admitted to the ICU, was seen by Dr. Brisceo, Pulmonology.  The patient was

given IV Solu-Medrol, oxygen, breathing treatment.  Condition improved over next

couple of days and she was transitioned to her home oxygen level 5 liters and

she is feeling better.  Her pH 7.34, pCO2 101, pO2 71, bicarbonate 54 at the

time of admission, pCO2 came down to 60.  The patient had a CT scan chest 
showed severe

emphysema, cardiomegaly, hiatal hernia.  The patient was seen by Physical

Therapy.  She has a caregiver at home.  The patient is a DNR.  The patient is

discharged back to home, had a flu shot and pneumonia shot.  She does not smoke,

quit smoking 5 years ago.



FINAL DIAGNOSES:

1.  Acute respiratory failure.

2.  Hypercapnic respiratory failure,

3.  Requiring BiPAP.

4.  Chronic obstructive pulmonary disease with emphysema.

5.  Chronic hypoxemia, on oxygen 5 liters.

6.  History of lung cancer, had radiation and chemo 5 years ago.

7.  Ex-smoker.

8.  Hypothyroidism.

9.  Chronic atrial fibrillation, rate controlled.

10.  Osteoporosis with compression fracture of the spine.



DISPOSITION:  Home.



PROGNOSIS:  Poor.



She has a home caregiver, DNR.

 



______________________________

SUMMER TABOR MD



DR:  RAGINI/aidan  JOB#:  [id number] / [id number]

DD:  12/15/2017 10:13  DT:  12/15/2017 13:40

CAMERON

## 2017-12-15 NOTE — PDOC
Provider Note


Provider Note


Discharge summary dictated.


#[id number]











SUMMER TABOR MD Dec 15, 2017 10:14

## 2018-02-07 ENCOUNTER — HOSPITAL ENCOUNTER (INPATIENT)
Dept: HOSPITAL 61 - ER | Age: 83
LOS: 3 days | Discharge: NURSING, REHAB OR LONG-TERM CARE | DRG: 542 | End: 2018-02-10
Attending: INTERNAL MEDICINE | Admitting: INTERNAL MEDICINE
Payer: MEDICARE

## 2018-02-07 DIAGNOSIS — Z82.3: ICD-10-CM

## 2018-02-07 DIAGNOSIS — Z87.891: ICD-10-CM

## 2018-02-07 DIAGNOSIS — Z90.710: ICD-10-CM

## 2018-02-07 DIAGNOSIS — Z83.3: ICD-10-CM

## 2018-02-07 DIAGNOSIS — I48.2: ICD-10-CM

## 2018-02-07 DIAGNOSIS — E03.9: ICD-10-CM

## 2018-02-07 DIAGNOSIS — F41.9: ICD-10-CM

## 2018-02-07 DIAGNOSIS — I50.33: ICD-10-CM

## 2018-02-07 DIAGNOSIS — Z85.118: ICD-10-CM

## 2018-02-07 DIAGNOSIS — M81.0: ICD-10-CM

## 2018-02-07 DIAGNOSIS — Z99.81: ICD-10-CM

## 2018-02-07 DIAGNOSIS — J84.9: ICD-10-CM

## 2018-02-07 DIAGNOSIS — F32.9: ICD-10-CM

## 2018-02-07 DIAGNOSIS — Z88.5: ICD-10-CM

## 2018-02-07 DIAGNOSIS — Z92.3: ICD-10-CM

## 2018-02-07 DIAGNOSIS — Z82.49: ICD-10-CM

## 2018-02-07 DIAGNOSIS — M54.5: ICD-10-CM

## 2018-02-07 DIAGNOSIS — Z90.49: ICD-10-CM

## 2018-02-07 DIAGNOSIS — Z66: ICD-10-CM

## 2018-02-07 DIAGNOSIS — Z88.8: ICD-10-CM

## 2018-02-07 DIAGNOSIS — M48.50XA: Primary | ICD-10-CM

## 2018-02-07 DIAGNOSIS — J44.1: ICD-10-CM

## 2018-02-07 DIAGNOSIS — M19.90: ICD-10-CM

## 2018-02-07 DIAGNOSIS — E43: ICD-10-CM

## 2018-02-07 DIAGNOSIS — I11.0: ICD-10-CM

## 2018-02-07 LAB
ADD MAN DIFF?: NO
ALBUMIN SERPL-MCNC: 2.7 G/DL (ref 3.4–5)
ALP SERPL-CCNC: 72 U/L (ref 46–116)
ALT (SGPT): 31 U/L (ref 14–59)
ANION GAP SERPL CALC-SCNC: 1 MMOL/L (ref 6–14)
AST SERPL-CCNC: 33 U/L (ref 15–37)
BACTERIA,URINE: (no result) /HPF
BASO #: 0.1 X10^3/UL (ref 0–0.2)
BASO %: 1 % (ref 0–3)
BILIRUB DIRECT SERPL-MCNC: < 0.1 MG/DL (ref 0–0.2)
BILIRUBIN,URINE: NEGATIVE
BLOOD UREA NITROGEN: 24 MG/DL (ref 7–20)
CALCIUM: 9.1 MG/DL (ref 8.5–10.1)
CHLORIDE: 101 MMOL/L (ref 98–107)
CK SERPL-CCNC: 60 U/L (ref 26–192)
CKMB INDEX: (no result) % (ref 0–4)
CKMB MASS: 1.5 NG/ML (ref 0–3.6)
CLARITY,URINE: (no result)
CO2 SERPL-SCNC: 44 MMOL/L (ref 21–32)
COLOR,URINE: YELLOW
CREAT SERPL-MCNC: 0.9 MG/DL (ref 0.6–1)
EOS #: 0.1 X10^3/UL (ref 0–0.7)
EOS %: 1 % (ref 0–3)
GFR SERPLBLD BASED ON 1.73 SQ M-ARVRAT: 59.5 ML/MIN
GLUCOSE SERPL-MCNC: 93 MG/DL (ref 70–99)
GLUCOSE,URINE: NEGATIVE MG/DL
HCG SERPL-ACNC: 7.9 X10^3/UL (ref 4–11)
HEMATOCRIT: 26.8 % (ref 36–47)
HEMOGLOBIN: 8.3 G/DL (ref 12–15.5)
LYMPH #: 1.5 X10^3/UL (ref 1–4.8)
LYMPH %: 20 % (ref 24–48)
MAGNESIUM: 1.9 MG/DL (ref 1.8–2.4)
MEAN CORPUSCULAR HEMOGLOBIN: 29 PG (ref 25–35)
MEAN CORPUSCULAR HGB CONC: 31 G/DL (ref 31–37)
MEAN CORPUSCULAR VOLUME: 93 FL (ref 79–100)
MONO #: 1.1 X10^3/UL (ref 0–1.1)
MONO %: 14 % (ref 0–9)
NEUT #: 5.1 X10^3UL (ref 1.8–7.7)
NEUT %: 65 % (ref 31–73)
NITRITE,URINE: NEGATIVE
NT-PRO BNP: 7450 PG/ML (ref 0–449)
PH,URINE: 6.5
PLATELET COUNT: 117 X10^3/UL (ref 140–400)
POTASSIUM SERPL-SCNC: 4.4 MMOL/L (ref 3.5–5.1)
PROTEIN,URINE: 30 MG/DL
RBC,URINE: (no result) /HPF (ref 0–2)
RED BLOOD COUNT: 2.88 X10^6/UL (ref 3.5–5.4)
RED CELL DISTRIBUTION WIDTH: 15.3 % (ref 11.5–14.5)
SODIUM: 146 MMOL/L (ref 136–145)
SPECIFIC GRAVITY,URINE: 1.02
SQUAMOUS EPITHELIAL CELL,UR: (no result) /LPF
THYROID STIM HORMONE (TSH): 0.28 UIU/ML (ref 0.36–3.74)
TOTAL BILIRUBIN: 0.4 MG/DL (ref 0.2–1)
TOTAL PROTEIN: 6.4 G/DL (ref 6.4–8.2)
TROPONINI: 0.08 NG/ML (ref 0–0.06)
TROPONINI: 0.08 NG/ML (ref 0–0.06)
TROPONINI: 0.09 NG/ML (ref 0–0.06)
UROBILINOGEN,URINE: 0.2 MG/DL
WBC,URINE: >40 /HPF (ref 0–4)

## 2018-02-07 PROCEDURE — 96365 THER/PROPH/DIAG IV INF INIT: CPT

## 2018-02-07 PROCEDURE — 99285 EMERGENCY DEPT VISIT HI MDM: CPT

## 2018-02-07 PROCEDURE — 36415 COLL VENOUS BLD VENIPUNCTURE: CPT

## 2018-02-07 PROCEDURE — 80076 HEPATIC FUNCTION PANEL: CPT

## 2018-02-07 PROCEDURE — 71045 X-RAY EXAM CHEST 1 VIEW: CPT

## 2018-02-07 PROCEDURE — 97535 SELF CARE MNGMENT TRAINING: CPT

## 2018-02-07 PROCEDURE — 82553 CREATINE MB FRACTION: CPT

## 2018-02-07 PROCEDURE — 93005 ELECTROCARDIOGRAM TRACING: CPT

## 2018-02-07 PROCEDURE — 94760 N-INVAS EAR/PLS OXIMETRY 1: CPT

## 2018-02-07 PROCEDURE — 97162 PT EVAL MOD COMPLEX 30 MIN: CPT

## 2018-02-07 PROCEDURE — 97166 OT EVAL MOD COMPLEX 45 MIN: CPT

## 2018-02-07 PROCEDURE — 93306 TTE W/DOPPLER COMPLETE: CPT

## 2018-02-07 PROCEDURE — 83880 ASSAY OF NATRIURETIC PEPTIDE: CPT

## 2018-02-07 PROCEDURE — 97530 THERAPEUTIC ACTIVITIES: CPT

## 2018-02-07 PROCEDURE — 83735 ASSAY OF MAGNESIUM: CPT

## 2018-02-07 PROCEDURE — 73030 X-RAY EXAM OF SHOULDER: CPT

## 2018-02-07 PROCEDURE — 94640 AIRWAY INHALATION TREATMENT: CPT

## 2018-02-07 PROCEDURE — 84484 ASSAY OF TROPONIN QUANT: CPT

## 2018-02-07 PROCEDURE — 80048 BASIC METABOLIC PNL TOTAL CA: CPT

## 2018-02-07 PROCEDURE — 85025 COMPLETE CBC W/AUTO DIFF WBC: CPT

## 2018-02-07 PROCEDURE — 96375 TX/PRO/DX INJ NEW DRUG ADDON: CPT

## 2018-02-07 PROCEDURE — 84443 ASSAY THYROID STIM HORMONE: CPT

## 2018-02-07 PROCEDURE — 87086 URINE CULTURE/COLONY COUNT: CPT

## 2018-02-07 PROCEDURE — 97116 GAIT TRAINING THERAPY: CPT

## 2018-02-07 PROCEDURE — 81001 URINALYSIS AUTO W/SCOPE: CPT

## 2018-02-07 RX ADMIN — FUROSEMIDE 1 MG: 10 INJECTION, SOLUTION INTRAMUSCULAR; INTRAVENOUS at 10:19

## 2018-02-07 RX ADMIN — CEFTRIAXONE 1 GM: 1 INJECTION, POWDER, FOR SOLUTION INTRAMUSCULAR; INTRAVENOUS at 19:37

## 2018-02-07 RX ADMIN — IPRATROPIUM BROMIDE AND ALBUTEROL SULFATE 1 ML: .5; 3 SOLUTION RESPIRATORY (INHALATION) at 20:33

## 2018-02-07 RX ADMIN — AZITHROMYCIN MONOHYDRATE 1 MLS/HR: 500 INJECTION, POWDER, LYOPHILIZED, FOR SOLUTION INTRAVENOUS at 10:00

## 2018-02-07 RX ADMIN — IPRATROPIUM BROMIDE AND ALBUTEROL SULFATE 1 ML: .5; 3 SOLUTION RESPIRATORY (INHALATION) at 10:00

## 2018-02-07 RX ADMIN — ENOXAPARIN SODIUM 1 MG: 40 INJECTION SUBCUTANEOUS at 19:37

## 2018-02-07 RX ADMIN — TRAZODONE HYDROCHLORIDE 1 MG: 100 TABLET ORAL at 22:22

## 2018-02-08 LAB
ADD MAN DIFF?: NO
ANION GAP SERPL CALC-SCNC: (no result) MMOL/L (ref 6–14)
BASO #: 0 X10^3/UL (ref 0–0.2)
BASO %: 1 % (ref 0–3)
BLOOD UREA NITROGEN: 23 MG/DL (ref 7–20)
CALCIUM: 9.1 MG/DL (ref 8.5–10.1)
CHLORIDE: 97 MMOL/L (ref 98–107)
CO2 SERPL-SCNC: > 45 MMOL/L (ref 21–32)
CREAT SERPL-MCNC: 0.9 MG/DL (ref 0.6–1)
EOS #: 0 X10^3/UL (ref 0–0.7)
EOS %: 0 % (ref 0–3)
GFR SERPLBLD BASED ON 1.73 SQ M-ARVRAT: 59.5 ML/MIN
GLUCOSE SERPL-MCNC: 80 MG/DL (ref 70–99)
HCG SERPL-ACNC: 7.1 X10^3/UL (ref 4–11)
HEMATOCRIT: 25.5 % (ref 36–47)
HEMOGLOBIN: 7.9 G/DL (ref 12–15.5)
LYMPH #: 1.7 X10^3/UL (ref 1–4.8)
LYMPH %: 25 % (ref 24–48)
MEAN CORPUSCULAR HEMOGLOBIN: 29 PG (ref 25–35)
MEAN CORPUSCULAR HGB CONC: 31 G/DL (ref 31–37)
MEAN CORPUSCULAR VOLUME: 92 FL (ref 79–100)
MONO #: 1 X10^3/UL (ref 0–1.1)
MONO %: 14 % (ref 0–9)
NEUT #: 4.3 X10^3UL (ref 1.8–7.7)
NEUT %: 61 % (ref 31–73)
PLATELET COUNT: 154 X10^3/UL (ref 140–400)
POTASSIUM SERPL-SCNC: 3.7 MMOL/L (ref 3.5–5.1)
RED BLOOD COUNT: 2.78 X10^6/UL (ref 3.5–5.4)
RED CELL DISTRIBUTION WIDTH: 15.1 % (ref 11.5–14.5)
SODIUM: 143 MMOL/L (ref 136–145)

## 2018-02-08 RX ADMIN — CEFPODOXIME PROXETIL 1 MG: 100 TABLET, FILM COATED ORAL at 13:18

## 2018-02-08 RX ADMIN — IPRATROPIUM BROMIDE AND ALBUTEROL SULFATE 1 ML: .5; 3 SOLUTION RESPIRATORY (INHALATION) at 12:00

## 2018-02-08 RX ADMIN — FUROSEMIDE 1 MG: 20 TABLET ORAL at 13:18

## 2018-02-08 RX ADMIN — VITAMIN D, TAB 1000IU (100/BT) 1 UNIT: 25 TAB at 08:37

## 2018-02-08 RX ADMIN — Medication 1 CAP: at 20:20

## 2018-02-08 RX ADMIN — LEVOTHYROXINE SODIUM 1 MCG: 88 TABLET ORAL at 06:02

## 2018-02-08 RX ADMIN — CITALOPRAM HYDROBROMIDE 1 MG: 20 TABLET ORAL at 08:38

## 2018-02-08 RX ADMIN — CEFPODOXIME PROXETIL 1 MG: 100 TABLET, FILM COATED ORAL at 20:19

## 2018-02-08 RX ADMIN — DIGOXIN 1 MCG: 125 TABLET ORAL at 08:38

## 2018-02-08 RX ADMIN — IPRATROPIUM BROMIDE AND ALBUTEROL SULFATE 1 ML: .5; 3 SOLUTION RESPIRATORY (INHALATION) at 16:00

## 2018-02-08 RX ADMIN — DOCUSATE SODIUM 1 MG: 100 CAPSULE, LIQUID FILLED ORAL at 08:38

## 2018-02-08 RX ADMIN — CYANOCOBALAMIN TAB 1000 MCG 1 MCG: 1000 TAB at 08:37

## 2018-02-08 RX ADMIN — SENNOSIDES A AND B 1 MG: 8.6 TABLET, FILM COATED ORAL at 08:38

## 2018-02-08 RX ADMIN — TRAZODONE HYDROCHLORIDE 1 MG: 100 TABLET ORAL at 20:19

## 2018-02-08 RX ADMIN — IPRATROPIUM BROMIDE AND ALBUTEROL SULFATE 1 ML: .5; 3 SOLUTION RESPIRATORY (INHALATION) at 20:07

## 2018-02-08 RX ADMIN — IPRATROPIUM BROMIDE AND ALBUTEROL SULFATE 1 ML: .5; 3 SOLUTION RESPIRATORY (INHALATION) at 15:57

## 2018-02-08 RX ADMIN — IPRATROPIUM BROMIDE AND ALBUTEROL SULFATE 1 ML: .5; 3 SOLUTION RESPIRATORY (INHALATION) at 07:16

## 2018-02-08 RX ADMIN — ENOXAPARIN SODIUM 1 MG: 40 INJECTION SUBCUTANEOUS at 18:00

## 2018-02-09 RX ADMIN — SENNOSIDES A AND B 1 MG: 8.6 TABLET, FILM COATED ORAL at 10:25

## 2018-02-09 RX ADMIN — IPRATROPIUM BROMIDE AND ALBUTEROL SULFATE 1 ML: .5; 3 SOLUTION RESPIRATORY (INHALATION) at 20:00

## 2018-02-09 RX ADMIN — FUROSEMIDE 1 MG: 20 TABLET ORAL at 10:25

## 2018-02-09 RX ADMIN — CEFPODOXIME PROXETIL 1 MG: 100 TABLET, FILM COATED ORAL at 10:26

## 2018-02-09 RX ADMIN — IPRATROPIUM BROMIDE AND ALBUTEROL SULFATE 1 ML: .5; 3 SOLUTION RESPIRATORY (INHALATION) at 07:17

## 2018-02-09 RX ADMIN — CEFPODOXIME PROXETIL 1 MG: 100 TABLET, FILM COATED ORAL at 21:00

## 2018-02-09 RX ADMIN — VITAMIN D, TAB 1000IU (100/BT) 1 UNIT: 25 TAB at 10:25

## 2018-02-09 RX ADMIN — IPRATROPIUM BROMIDE AND ALBUTEROL SULFATE 1 ML: .5; 3 SOLUTION RESPIRATORY (INHALATION) at 15:29

## 2018-02-09 RX ADMIN — Medication 1 CAP: at 10:25

## 2018-02-09 RX ADMIN — TRAZODONE HYDROCHLORIDE 1 MG: 100 TABLET ORAL at 21:00

## 2018-02-09 RX ADMIN — DOCUSATE SODIUM 1 MG: 100 CAPSULE, LIQUID FILLED ORAL at 10:25

## 2018-02-09 RX ADMIN — CYANOCOBALAMIN TAB 1000 MCG 1 MCG: 1000 TAB at 10:26

## 2018-02-09 RX ADMIN — IPRATROPIUM BROMIDE AND ALBUTEROL SULFATE 1 ML: .5; 3 SOLUTION RESPIRATORY (INHALATION) at 11:59

## 2018-02-09 RX ADMIN — CITALOPRAM HYDROBROMIDE 1 MG: 20 TABLET ORAL at 10:25

## 2018-02-09 RX ADMIN — LEVOTHYROXINE SODIUM 1 MCG: 88 TABLET ORAL at 06:08

## 2018-02-09 RX ADMIN — DIGOXIN 1 MCG: 125 TABLET ORAL at 09:00

## 2018-02-09 RX ADMIN — Medication 1 CAP: at 19:48

## 2018-02-09 RX ADMIN — DIGOXIN 1 MCG: 125 TABLET ORAL at 10:26

## 2018-02-09 RX ADMIN — ENOXAPARIN SODIUM 1 MG: 40 INJECTION SUBCUTANEOUS at 18:00

## 2018-02-10 RX ADMIN — IPRATROPIUM BROMIDE AND ALBUTEROL SULFATE 1 ML: .5; 3 SOLUTION RESPIRATORY (INHALATION) at 07:12

## 2018-02-10 RX ADMIN — LEVOTHYROXINE SODIUM 1 MCG: 88 TABLET ORAL at 05:52

## 2018-02-10 RX ADMIN — Medication 1 CAP: at 08:09

## 2018-02-10 RX ADMIN — FUROSEMIDE 1 MG: 20 TABLET ORAL at 08:08

## 2018-02-10 RX ADMIN — DOCUSATE SODIUM 1 MG: 100 CAPSULE, LIQUID FILLED ORAL at 08:08

## 2018-02-10 RX ADMIN — DIGOXIN 1 MCG: 125 TABLET ORAL at 08:13

## 2018-02-10 RX ADMIN — SENNOSIDES A AND B 1 MG: 8.6 TABLET, FILM COATED ORAL at 08:08

## 2018-02-10 RX ADMIN — VITAMIN D, TAB 1000IU (100/BT) 1 UNIT: 25 TAB at 08:09

## 2018-02-10 RX ADMIN — CYANOCOBALAMIN TAB 1000 MCG 1 MCG: 1000 TAB at 08:09

## 2018-02-10 RX ADMIN — ACETAMINOPHEN 1 MG: 325 TABLET, FILM COATED ORAL at 13:47

## 2018-02-10 RX ADMIN — IPRATROPIUM BROMIDE AND ALBUTEROL SULFATE 1 ML: .5; 3 SOLUTION RESPIRATORY (INHALATION) at 11:55

## 2018-02-10 RX ADMIN — CEFPODOXIME PROXETIL 1 MG: 100 TABLET, FILM COATED ORAL at 08:09

## 2018-02-10 RX ADMIN — CITALOPRAM HYDROBROMIDE 1 MG: 20 TABLET ORAL at 08:08

## 2024-01-30 NOTE — PDOC
PROGRESS NOTES


Subjective


Subjective


pt feels better ,anxious to go home





Objective


Objective





 Vital Signs








  Date Time  Temp Pulse Resp B/P Pulse Ox O2 Delivery O2 Flow Rate FiO2


 


3/2/17 08:54  84  133/49    


 


3/2/17 07:23     96 Nasal Cannula 5.0 


 


3/2/17 07:21 97.5  18     





 97.5       














 Intake and Output 


 


 3/2/17





 07:00


 


Intake Total 960 ml


 


Output Total 651 ml


 


Balance 309 ml


 


 


 


Intake Oral 960 ml


 


Output Urine Total 650 ml


 


Stool Total 1 ml


 


# Voids 4


 


# Bowel Movements 1











Physical Exam


Abdomen:  Normal bowel sounds, Soft


Heart:  Regular rate, Normal S1, Normal S2


Extremities:  No clubbing


General:  Alert


HEENT:  Atraumatic


Lungs:  Normal air movement


MUSCULOSKELETAL:  No swelling


Neck:  Supple


Neuro:  Normal speech


Psych/Mental Status:  Mental status NL


Skin:  No breakdown





Diagnosis


Problem List


 Problems


Medical Problems:


(1) COPD (chronic obstructive pulmonary disease)


Status: Acute  





(2) Healthcare-associated pneumonia


Status: Acute  











Assessment


Assessment


 Problems


Medical Problems:


(1) COPD (chronic obstructive pulmonary disease)


Status: Acute  





(2) Healthcare-associated pneumonia


Status: Acute  





FINAL IMPRESSION:


1.  Chronic obstructive pulmonary disease with acute exacerbation, possibility


of underlying pneumonitis.


2.  Scarring in the lung, history of lung cancer, had radiation treatment 4 to 5


years ago.


3.  Hypothyroidism.


4.  Atrial fibrillation.


5.  Chronic systolic heart failure.


6.  Anxiety and depression.





PLAN: 


d/c iv antibiotics,change to po.


taper steroids 


home tomorrow.





 At this time, admit to hospital, sputum cultures.  Started on Solu-Medrol


t.i.d., Zosyn, and vancomycin and see how the patient can improve.  Some


breathing treatments, hydration, and the patient wishes DNR.


Problems:  





Plan


Plan of Care


 Problems


Medical Problems:


(1) COPD (chronic obstructive pulmonary disease)


Status: Acute  





(2) Healthcare-associated pneumonia


Status: Acute  








Comment


Review of Relevant


I have reviewed the following items shan (where applicable) has been applied.


Labs





Microbiology


3/1/17 Blood Culture - Preliminary, Resulted


         NO GROWTH AFTER 1 DAY


Medications





Current Medications


Albuterol Sulfate (Ventolin Neb Soln) 2.5 mg PRN QID  PRN NEB WHEEZING;  Start 3

/1/17 at 10:15


Albuterol/ Ipratropium (Duoneb) 3 ml QID NEB  Last administered on 3/2/17at 07:

22;  Start 3/1/17 at 13:00


Alprazolam (Xanax) 0.25 mg PRN TID  PRN PO ANXIETY / AGITATION Last 

administered on 3/1/17at 21:16;  Start 3/1/17 at 10:00


Cyanocobalamin (Vitamin B-12) 1,000 mcg DAILY PO  Last administered on 3/2/17at 

08:52;  Start 3/1/17 at 11:00


Digoxin (Lanoxin) 125 mcg DAILY PO  Last administered on 3/2/17at 08:54;  Start 

3/1/17 at 11:00


Docusate Sodium (Colace) 100 mg DAILY PO  Last administered on 3/2/17at 08:52;  

Start 3/1/17 at 11:00


Enoxaparin Sodium 40 mg 40 mg Q24H SQ  Last administered on 3/1/17at 10:38;  

Start 3/1/17 at 11:00


Escitalopram Oxalate (Lexapro) 20 mg DAILY PO  Last administered on 3/2/17at 08:

55;  Start 3/1/17 at 11:00


Levofloxacin/ Dextrose 150 ml @  100 mls/hr Q48H IV ;  Start 3/3/17 at 05:00


Levothyroxine Sodium (Synthroid) 88 mcg DAILY07 PO  Last administered on 3/2/

17at 05:17;  Start 3/1/17 at 11:00


Methylprednisolone Sodium Succinate (Solu-Medrol 125mg Vial) 60 mg Q8HRS IV  

Last administered on 3/2/17at 05:17;  Start 3/1/17 at 14:00


Ondansetron HCl (Zofran Odt) 4 mg PRN Q8HRS  PRN PO NAUSEA;  Start 3/1/17 at 10:

00


Oxycodone/ Acetaminophen (Percocet 10/325) 1 tab PRN TID  PRN PO PAIN Last 

administered on 3/2/17at 08:58;  Start 3/1/17 at 10:00


Piperacillin Sod/ Tazobactam Sod/ Sodium Chloride (Zosyn/Iv Sodium Chloride 0.9

% 50ml) 50 ml @  100 mls/hr Q6HRS IV  Last administered on 3/2/17at 05:17;  

Start 3/1/17 at 12:00


Sennosides (Senna) 8.6 mg DAILY PO  Last administered on 3/2/17at 08:53;  Start 

3/1/17 at 11:00


Sodium Chloride (Iv Sodium Chloride 0.9% 1000ml Bag) 1,000 ml @  75 mls/hr 

[id number] IV  Last administered on 3/2/17at 01:09;  Start 3/1/17 at 11:00


Trazodone HCl 100 mg 100 mg QHS PO  Last administered on 3/1/17at 21:16;  Start 

3/1/17 at 21:00


Vancomycin HCl 1 each 1X  ONCE MC ;  Start 3/3/17 at 09:30;  Stop 3/3/17 at 09:

31


Vancomycin HCl/ Sodium Chloride (Iv Sodium Chloride 0.9% 250ml) 250 ml @  250 

mls/hr Q24H IV  Last administered on 3/2/17at 09:03;  Start 3/2/17 at 10:00


Vitals/I & O





 Vital Sign - Last 24 Hours








 3/1/17 3/1/17 3/1/17 3/1/17





 10:41 11:15 11:18 15:03


 


Temp  97.9  98.2





  97.9  98.2


 


Pulse 88 80  67


 


Resp  18  16


 


B/P 113/64 120/61  145/67


 


Pulse Ox  96  96


 


O2 Delivery  Nasal Cannula Nasal Cannula Nasal Cannula


 


O2 Flow Rate  8.0 6.0 6.0


 


    





    





 3/1/17 3/1/17 3/1/17 3/1/17





 15:12 19:00 19:57 20:06


 


Temp  97.7  





  97.7  


 


Pulse  81  


 


Resp  18  


 


B/P  127/55  


 


Pulse Ox  97 96 


 


O2 Delivery Nasal Cannula Nasal Cannula Nasal Cannula Nasal Cannula


 


O2 Flow Rate 6.0 6.0 6.0 6.0


 


    





    





 3/1/17 3/1/17 3/1/17 3/2/17





 21:15 22:15 23:00 03:00


 


Temp   97.9 97.9





   97.9 97.9


 


Pulse   87 88


 


Resp 18 18 18 18


 


B/P   111/62 118/72


 


Pulse Ox 96 96 98 97


 


O2 Delivery Nasal Cannula Nasal Cannula Nasal Cannula Room Air


 


O2 Flow Rate 6.0 6.0 6.0 


 


    





    





 3/2/17 3/2/17 3/2/17 





 07:21 07:23 08:54 


 


Temp 97.5   





 97.5   


 


Pulse 84  84 


 


Resp 18   


 


B/P 133/49  133/49 


 


Pulse Ox 96 96  


 


O2 Delivery Nasal Cannula Nasal Cannula  


 


O2 Flow Rate 5.0 5.0  














 Intake and Output   


 


 3/1/17 3/1/17 3/2/17





 15:00 23:00 07:00


 


Intake Total 600 ml 360 ml 


 


Output Total 200 ml 451 ml 


 


Balance 400 ml -91 ml 














SUMMER TABOR MD Mar 2, 2017 09:36
PROGRESS NOTES


Subjective


Subjective


want to go home today ,feels better





Objective


Objective





 Vital Signs








  Date Time  Temp Pulse Resp B/P Pulse Ox O2 Delivery O2 Flow Rate FiO2


 


3/3/17 08:36  77  123/66    


 


3/3/17 08:00      Nasal Cannula 5.0 


 


3/3/17 07:13     96   


 


3/3/17 07:10 97.7  18     





 97.7       














 Intake and Output 


 


 3/3/17





 07:00


 


Intake Total 840 ml


 


Output Total 900 ml


 


Balance -60 ml


 


 


 


Intake Oral 840 ml


 


Output Urine Total 900 ml


 


# Voids 5











Physical Exam


Abdomen:  Normal bowel sounds, Soft


Heart:  Regular rate, Normal S1, Normal S2


Extremities:  No clubbing


General:  Alert


HEENT:  Atraumatic


Lungs:  Normal air movement


MUSCULOSKELETAL:  No swelling


Neck:  Supple


Neuro:  Normal speech


Psych/Mental Status:  Mental status NL


Skin:  No breakdown





Diagnosis


Problem List


 Problems


Medical Problems:


(1) COPD (chronic obstructive pulmonary disease)


Status: Acute  





(2) Healthcare-associated pneumonia


Status: Acute  











Assessment


Assessment


 Problems


Medical Problems:


(1) COPD (chronic obstructive pulmonary disease)


Status: Acute  





(2) Healthcare-associated pneumonia


Status: Acute  





FINAL IMPRESSION:


1.  Chronic obstructive pulmonary disease with acute exacerbation, possibility


of underlying pneumonitis.


2.  Scarring in the lung, history of lung cancer, had radiation treatment 4 to 5


years ago.


3.  Hypothyroidism.


4.  Atrial fibrillation.


5.  Chronic systolic heart failure.


6.  Anxiety and depression.





PLAN: 


 antibiotics,change to po.


taper steroids ,po prednisone


home today.


swallow study today





 At this time, admit to hospital, sputum cultures.  Started on Solu-Medrol


t.i.d., Zosyn, and vancomycin and see how the patient can improve.  Some


breathing treatments, hydration, and the patient wishes DNR.


Problems:  





Plan


Plan of Care


 Problems


Medical Problems:


(1) COPD (chronic obstructive pulmonary disease)


Status: Acute  





(2) Healthcare-associated pneumonia


Status: Acute  








Comment


Review of Relevant


I have reviewed the following items shan (where applicable) has been applied.


Labs





Microbiology


3/1/17 Blood Culture - Preliminary, Resulted


         NO GROWTH AFTER 2 DAYS


Medications





Current Medications


Levofloxacin (Levaquin) 250 mg DAILY06 PO  Last administered on 3/3/17at 05:12;

  Start 3/2/17 at 10:30


Levofloxacin/ Dextrose 150 ml @  100 mls/hr Q48H IV ;  Start 3/3/17 at 05:00;  

Stop 3/3/17 at 05:00;  Status DC


Methylprednisolone Sodium Succinate (Solu-Medrol 125mg Vial) 60 mg BID IV  Last 

administered on 3/3/17at 08:37;  Start 3/2/17 at 10:30


Vancomycin HCl 1 each 1X  ONCE MC ;  Start 3/3/17 at 09:30;  Stop 3/3/17 at 09:

30;  Status DC


Vancomycin HCl/ Sodium Chloride (Iv Sodium Chloride 0.9% 250ml) 250 ml @  250 

mls/hr Q24H IV  Last administered on 3/2/17at 09:03;  Start 3/2/17 at 10:00;  

Stop 3/2/17 at 10:00;  Status DC


Vitals/I & O





 Vital Sign - Last 24 Hours








 3/2/17 3/2/17 3/2/17 3/2/17





 10:15 11:03 14:20 15:02


 


Temp 96.8  97.5 





 96.8  97.5 


 


Pulse 72  79 


 


Resp 18  18 


 


B/P 122/49  145/73 


 


Pulse Ox 95 96 96 96


 


O2 Delivery Nasal Cannula Nasal Cannula Nasal Cannula Nasal Cannula


 


O2 Flow Rate 5.0 5.0 5.0 5.0


 


    





    





 3/2/17 3/2/17 3/2/17 3/2/17





 19:00 20:11 20:13 20:48


 


Temp 98.1   





 98.1   


 


Pulse 83   


 


Resp 20   18


 


B/P 130/44   


 


Pulse Ox 97  96 96


 


O2 Delivery Nasal Cannula Nasal Cannula Nasal Cannula Nasal Cannula


 


O2 Flow Rate 5.0 5.0 5.0 5.0


 


    





    





 3/2/17 3/2/17 3/3/17 3/3/17





 21:50 23:00 03:00 07:10


 


Temp  97.7 96.1 97.7





  97.7 96.1 97.7


 


Pulse  73 76 77


 


Resp 18 20 20 18


 


B/P  112/60 114/69 123/66


 


Pulse Ox 96 93 92 98


 


O2 Delivery Nasal Cannula Nasal Cannula Nasal Cannula Nasal Cannula


 


O2 Flow Rate 5.0 5.0 5.0 5.0


 


    





    





 3/3/17 3/3/17 3/3/17 





 07:13 08:00 08:36 


 


Pulse   77 


 


B/P   123/66 


 


Pulse Ox 96   


 


O2 Delivery Nasal Cannula Nasal Cannula  


 


O2 Flow Rate 5.0 5.0  














 Intake and Output   


 


 3/2/17 3/2/17 3/3/17





 15:00 23:00 07:00


 


Intake Total 540 ml 300 ml 


 


Output Total 850 ml 50 ml 


 


Balance -310 ml 250 ml 














SUMMER TABOR MD Mar 3, 2017 09:41
PULMONARY PROGRESS NOTES


Subjective


pt with no increase soa


Vitals





 Vital Signs








  Date Time  Temp Pulse Resp B/P Pulse Ox O2 Delivery O2 Flow Rate FiO2


 


3/2/17 15:02     96 Nasal Cannula 5.0 


 


3/2/17 14:20 97.5 79 18 145/73    





 97.5       








ROS:  No Nausea, No Chest Pain, No Abdominal Pain


General:  Alert, No acute distress


HEENT:  Other


Lungs:  Wheezing


Cardiovascular:  S1, S2


Abdomen:  Soft, Non-tender


Neuro Exam:  Alert


Extremities:  No Edema


Skin:  Warm


Labs





Laboratory Tests








Test


  3/1/17


05:10


 


White Blood Count


  6.7x10^3/uL


(4.0-11.0)


 


Red Blood Count


  3.28x10^6/uL


(3.50-5.40)


 


Hemoglobin


  9.1g/dL


(12.0-15.5)


 


Hematocrit


  29.1%


(36.0-47.0)


 


Mean Corpuscular Volume 89fL () 


 


Mean Corpuscular Hemoglobin 28pg (25-35) 


 


Mean Corpuscular Hemoglobin


Concent 31g/dL (31-37) 


 


 


Red Cell Distribution Width


  16.8%


(11.5-14.5)


 


Platelet Count


  102x10^3/uL


(140-400)


 


Neutrophils (%) (Auto) 66% (31-73) 


 


Lymphocytes (%) (Auto) 20% (24-48) 


 


Monocytes (%) (Auto) 12% (0-9) 


 


Eosinophils (%) (Auto) 2% (0-3) 


 


Basophils (%) (Auto) 1% (0-3) 


 


Neutrophils # (Auto)


  4.4x10^3uL


(1.8-7.7)


 


Lymphocytes # (Auto)


  1.3x10^3/uL


(1.0-4.8)


 


Monocytes # (Auto)


  0.8x10^3/uL


(0.0-1.1)


 


Eosinophils # (Auto)


  0.1x10^3/uL


(0.0-0.7)


 


Basophils # (Auto)


  0.0x10^3/uL


(0.0-0.2)


 


Sodium Level


  141mmol/L


(136-145)


 


Potassium Level


  4.0mmol/L


(3.5-5.1)


 


Chloride Level


  97mmol/L


()


 


Carbon Dioxide Level


  43mmol/L


(21-32)


 


Anion Gap 1 (6-14) 


 


Blood Urea Nitrogen 14mg/dL (7-20) 


 


Creatinine


  1.1mg/dL


(0.6-1.0)


 


Estimated GFR


(Cockcroft-Gault) 47.3 


 


 


Glucose Level


  159mg/dL


(70-99)


 


Lactic Acid Level


  1.1mmol/L


(0.4-2.0)


 


Calcium Level


  9.0mg/dL


(8.5-10.1)


 


Troponin I Quantitative


  < 0.017ng/mL


(0.000-0.055)


 


NT-Pro-B-Type Natriuretic


Peptide 1257pg/mL


(0-449)








Medications





Active Scripts








 Medications  Dose


 Route/Sig Days Date Category


 


 Duoneb 0.5-3(2.5)


 Mg/3 Ml


  (Albuterol/Ipratropium)


 3 Ml Ampul.neb  3 Ml


 NEB QID   3/1/17 Reported


 


 Vitamin B-12


  (Cyanocobalamin


  (Vitamin B-12))


 1,000 Mcg Tablet  1 Tab


 PO DAILY   1/19/17 Rx


 


 Senna


  (Sennosides) 8.6


 Mg Tablet  8.6 Mg


 PO DAILY   9/14/16 Rx


 


 Docusate Sodium


 100 Mg Capsule  1 Cap


 PO DAILY   9/14/16 Rx


 


 Trazodone Hcl 100


 Mg Tablet  1 Tab


 PO QHS   9/1/16 Reported


 


 Vitamin D


  (Cholecalciferol


  (Vitamin D3))


 1,000 Unit Capsule  1 Cap


 PO DAILY   9/1/16 Reported


 


 Percocet 


 Mg Tablet


  (Oxycodone/Acetaminophen)


 1 Each Tablet  1 Tab


 PO TID PRN   6/8/16 Rx


 


 Cranberry


  (Cranberry Fruit)


 400 Mg Tablet  4,200 Mg


 PO DAILY   6/1/16 Reported


 


 Levothyroxine


 Sodium 88 Mcg


 Tablet  1 Tab


 PO DAILY   5/31/16 Reported


 


 Alprazolam 0.25


 Mg Tablet  1 Tab


 PO PRN TID PRN   5/31/16 Reported


 


 Digoxin 125 Mcg


 Tablet  1 Tab


 PO DAILY   6/1/15 Reported


 


 Zofran Odt


  (Ondansetron) 4


 Mg Tab.rapdis  1 Tab


 SL Q8HRS PRN   6/1/15 Reported


 


 Escitalopram


 Oxalate 10 Mg


 Tablet  20 Mg


 PO DAILY   6/8/14 Reported











Impression


.


1.  Acute on chronic respiratory failure secondary to acute exacerbation of


chronic obstructive pulmonary disease.


2.  Clinical pneumonia.


3.  History of lung cancer status post radiation 4-5 years ago.


4.  Hypothyroidism.


5.  Chronic atrial fibrillation.


6.  Chronic systolic failure.





Plan


.


improving


continue the same


she may be silently aspirating 


will check video swallow








JULIO CESAR KAPADIA MD Mar 2, 2017 15:34
PULMONARY PROGRESS NOTES


Vitals





 Vital Signs








  Date Time  Temp Pulse Resp B/P Pulse Ox O2 Delivery O2 Flow Rate FiO2


 


3/1/17 15:12      Nasal Cannula 6.0 


 


3/1/17 15:03 98.2 67 16 145/67 96   





 98.2       








General:  Alert, Oriented X4, No acute distress


HEENT:  Other


Lungs:  Other


Cardiovascular:  S1, S2


Abdomen:  Soft, Non-tender


Extremities:  No Edema


Labs





Laboratory Tests








Test


  3/1/17


05:10


 


White Blood Count


  6.7x10^3/uL


(4.0-11.0)


 


Red Blood Count


  3.28x10^6/uL


(3.50-5.40)


 


Hemoglobin


  9.1g/dL


(12.0-15.5)


 


Hematocrit


  29.1%


(36.0-47.0)


 


Mean Corpuscular Volume 89fL () 


 


Mean Corpuscular Hemoglobin 28pg (25-35) 


 


Mean Corpuscular Hemoglobin


Concent 31g/dL (31-37) 


 


 


Red Cell Distribution Width


  16.8%


(11.5-14.5)


 


Platelet Count


  102x10^3/uL


(140-400)


 


Neutrophils (%) (Auto) 66% (31-73) 


 


Lymphocytes (%) (Auto) 20% (24-48) 


 


Monocytes (%) (Auto) 12% (0-9) 


 


Eosinophils (%) (Auto) 2% (0-3) 


 


Basophils (%) (Auto) 1% (0-3) 


 


Neutrophils # (Auto)


  4.4x10^3uL


(1.8-7.7)


 


Lymphocytes # (Auto)


  1.3x10^3/uL


(1.0-4.8)


 


Monocytes # (Auto)


  0.8x10^3/uL


(0.0-1.1)


 


Eosinophils # (Auto)


  0.1x10^3/uL


(0.0-0.7)


 


Basophils # (Auto)


  0.0x10^3/uL


(0.0-0.2)


 


Sodium Level


  141mmol/L


(136-145)


 


Potassium Level


  4.0mmol/L


(3.5-5.1)


 


Chloride Level


  97mmol/L


()


 


Carbon Dioxide Level


  43mmol/L


(21-32)


 


Anion Gap 1 (6-14) 


 


Blood Urea Nitrogen 14mg/dL (7-20) 


 


Creatinine


  1.1mg/dL


(0.6-1.0)


 


Estimated GFR


(Cockcroft-Gault) 47.3 


 


 


Glucose Level


  159mg/dL


(70-99)


 


Lactic Acid Level


  1.1mmol/L


(0.4-2.0)


 


Calcium Level


  9.0mg/dL


(8.5-10.1)


 


Troponin I Quantitative


  < 0.017ng/mL


(0.000-0.055)


 


NT-Pro-B-Type Natriuretic


Peptide 1257pg/mL


(0-449)








Laboratory Tests








Test


  3/1/17


05:10


 


White Blood Count


  6.7x10^3/uL


(4.0-11.0)


 


Red Blood Count


  3.28x10^6/uL


(3.50-5.40)


 


Hemoglobin


  9.1g/dL


(12.0-15.5)


 


Hematocrit


  29.1%


(36.0-47.0)


 


Mean Corpuscular Volume 89fL () 


 


Mean Corpuscular Hemoglobin 28pg (25-35) 


 


Mean Corpuscular Hemoglobin


Concent 31g/dL (31-37) 


 


 


Red Cell Distribution Width


  16.8%


(11.5-14.5)


 


Platelet Count


  102x10^3/uL


(140-400)


 


Neutrophils (%) (Auto) 66% (31-73) 


 


Lymphocytes (%) (Auto) 20% (24-48) 


 


Monocytes (%) (Auto) 12% (0-9) 


 


Eosinophils (%) (Auto) 2% (0-3) 


 


Basophils (%) (Auto) 1% (0-3) 


 


Neutrophils # (Auto)


  4.4x10^3uL


(1.8-7.7)


 


Lymphocytes # (Auto)


  1.3x10^3/uL


(1.0-4.8)


 


Monocytes # (Auto)


  0.8x10^3/uL


(0.0-1.1)


 


Eosinophils # (Auto)


  0.1x10^3/uL


(0.0-0.7)


 


Basophils # (Auto)


  0.0x10^3/uL


(0.0-0.2)


 


Sodium Level


  141mmol/L


(136-145)


 


Potassium Level


  4.0mmol/L


(3.5-5.1)


 


Chloride Level


  97mmol/L


()


 


Carbon Dioxide Level


  43mmol/L


(21-32)


 


Anion Gap 1 (6-14) 


 


Blood Urea Nitrogen 14mg/dL (7-20) 


 


Creatinine


  1.1mg/dL


(0.6-1.0)


 


Estimated GFR


(Cockcroft-Gault) 47.3 


 


 


Glucose Level


  159mg/dL


(70-99)


 


Lactic Acid Level


  1.1mmol/L


(0.4-2.0)


 


Calcium Level


  9.0mg/dL


(8.5-10.1)


 


Troponin I Quantitative


  < 0.017ng/mL


(0.000-0.055)


 


NT-Pro-B-Type Natriuretic


Peptide 1257pg/mL


(0-449)








Medications





Active Scripts








 Medications  Dose


 Route/Sig Days Date Category


 


 Duoneb 0.5-3(2.5)


 Mg/3 Ml


  (Albuterol/Ipratropium)


 3 Ml Ampul.neb  3 Ml


 NEB QID   3/1/17 Reported


 


 Vitamin B-12


  (Cyanocobalamin


  (Vitamin B-12))


 1,000 Mcg Tablet  1 Tab


 PO DAILY   1/19/17 Rx


 


 Senna


  (Sennosides) 8.6


 Mg Tablet  8.6 Mg


 PO DAILY   9/14/16 Rx


 


 Docusate Sodium


 100 Mg Capsule  1 Cap


 PO DAILY   9/14/16 Rx


 


 Trazodone Hcl 100


 Mg Tablet  1 Tab


 PO QHS   9/1/16 Reported


 


 Vitamin D


  (Cholecalciferol


  (Vitamin D3))


 1,000 Unit Capsule  1 Cap


 PO DAILY   9/1/16 Reported


 


 Percocet 


 Mg Tablet


  (Oxycodone/Acetaminophen)


 1 Each Tablet  1 Tab


 PO TID PRN   6/8/16 Rx


 


 Cranberry


  (Cranberry Fruit)


 400 Mg Tablet  4,200 Mg


 PO DAILY   6/1/16 Reported


 


 Levothyroxine


 Sodium 88 Mcg


 Tablet  1 Tab


 PO DAILY   5/31/16 Reported


 


 Alprazolam 0.25


 Mg Tablet  1 Tab


 PO PRN TID PRN   5/31/16 Reported


 


 Digoxin 125 Mcg


 Tablet  1 Tab


 PO DAILY   6/1/15 Reported


 


 Zofran Odt


  (Ondansetron) 4


 Mg Tab.rapdis  1 Tab


 SL Q8HRS PRN   6/1/15 Reported


 


 Escitalopram


 Oxalate 10 Mg


 Tablet  20 Mg


 PO DAILY   6/8/14 Reported











Impression


.


full note dictated


a/c resp failure


aecopd


clinical pneumonia


agree with current rx


thanks








JULIO CESAR KAPADIA MD Mar 1, 2017 16:02
Provider Note


Provider Note


Discharge summary dictated.


#[id number]








SUMMER TABOR MD Mar 6, 2017 10:12
Provider Note


Provider Note


Pt seen.H&P dictated.


#[id number]








SUMMER TABOR MD Mar 1, 2017 10:03
room air